# Patient Record
Sex: FEMALE | Race: WHITE | HISPANIC OR LATINO | Employment: UNEMPLOYED | URBAN - METROPOLITAN AREA
[De-identification: names, ages, dates, MRNs, and addresses within clinical notes are randomized per-mention and may not be internally consistent; named-entity substitution may affect disease eponyms.]

---

## 2017-07-03 ENCOUNTER — OFFICE VISIT (OUTPATIENT)
Dept: FAMILY MEDICINE CLINIC | Facility: CLINIC | Age: 57
End: 2017-07-03

## 2017-07-03 VITALS
RESPIRATION RATE: 16 BRPM | SYSTOLIC BLOOD PRESSURE: 120 MMHG | OXYGEN SATURATION: 97 % | HEIGHT: 62 IN | HEART RATE: 78 BPM | BODY MASS INDEX: 40.67 KG/M2 | DIASTOLIC BLOOD PRESSURE: 76 MMHG | WEIGHT: 221 LBS

## 2017-07-03 DIAGNOSIS — Z11.4 ENCOUNTER FOR SCREENING FOR HIV: ICD-10-CM

## 2017-07-03 DIAGNOSIS — E55.9 VITAMIN D DEFICIENCY: ICD-10-CM

## 2017-07-03 DIAGNOSIS — G89.4 CHRONIC PAIN SYNDROME: ICD-10-CM

## 2017-07-03 DIAGNOSIS — Z87.891 PERSONAL HISTORY OF TOBACCO USE, PRESENTING HAZARDS TO HEALTH: ICD-10-CM

## 2017-07-03 DIAGNOSIS — F41.8 DEPRESSION WITH ANXIETY: ICD-10-CM

## 2017-07-03 DIAGNOSIS — Z00.00 MEDICARE ANNUAL WELLNESS VISIT, INITIAL: Primary | ICD-10-CM

## 2017-07-03 LAB
25(OH)D3 SERPL-MCNC: 9.1 NG/ML
ALBUMIN SERPL-MCNC: 4.3 G/DL (ref 3.2–4.8)
ALBUMIN/GLOB SERPL: 1.6 G/DL (ref 1.5–2.5)
ALP SERPL-CCNC: 109 U/L (ref 25–100)
ALT SERPL W P-5'-P-CCNC: 16 U/L (ref 7–40)
ANION GAP SERPL CALCULATED.3IONS-SCNC: 8 MMOL/L (ref 3–11)
ARTICHOKE IGE QN: 201 MG/DL (ref 0–130)
AST SERPL-CCNC: 20 U/L (ref 0–33)
BASOPHILS # BLD AUTO: 0.06 10*3/MM3 (ref 0–0.2)
BASOPHILS NFR BLD AUTO: 0.7 % (ref 0–1)
BILIRUB SERPL-MCNC: 0.3 MG/DL (ref 0.3–1.2)
BUN BLD-MCNC: 10 MG/DL (ref 9–23)
BUN/CREAT SERPL: 20 (ref 7–25)
CALCIUM SPEC-SCNC: 9.9 MG/DL (ref 8.7–10.4)
CHLORIDE SERPL-SCNC: 100 MMOL/L (ref 99–109)
CHOLEST SERPL-MCNC: 266 MG/DL (ref 0–200)
CO2 SERPL-SCNC: 31 MMOL/L (ref 20–31)
CREAT BLD-MCNC: 0.5 MG/DL (ref 0.6–1.3)
CRP SERPL-MCNC: 1.02 MG/DL (ref 0–1)
DEPRECATED RDW RBC AUTO: 47.9 FL (ref 37–54)
EOSINOPHIL # BLD AUTO: 0.38 10*3/MM3 (ref 0–0.3)
EOSINOPHIL NFR BLD AUTO: 4.3 % (ref 0–3)
ERYTHROCYTE [DISTWIDTH] IN BLOOD BY AUTOMATED COUNT: 13.5 % (ref 11.3–14.5)
GFR SERPL CREATININE-BSD FRML MDRD: 128 ML/MIN/1.73
GFR SERPL CREATININE-BSD FRML MDRD: >150 ML/MIN/1.73
GLOBULIN UR ELPH-MCNC: 2.7 GM/DL
GLUCOSE BLD-MCNC: 72 MG/DL (ref 70–100)
HBA1C MFR BLD: 5.9 % (ref 4.8–5.6)
HCT VFR BLD AUTO: 42.1 % (ref 34.5–44)
HCV AB SER DONR QL: NORMAL
HDLC SERPL-MCNC: 53 MG/DL (ref 40–60)
HGB BLD-MCNC: 13.3 G/DL (ref 11.5–15.5)
HIV1+2 AB SER QL: NORMAL
IMM GRANULOCYTES # BLD: 0.04 10*3/MM3 (ref 0–0.03)
IMM GRANULOCYTES NFR BLD: 0.4 % (ref 0–0.6)
LYMPHOCYTES # BLD AUTO: 3.55 10*3/MM3 (ref 0.6–4.8)
LYMPHOCYTES NFR BLD AUTO: 39.7 % (ref 24–44)
MCH RBC QN AUTO: 30.6 PG (ref 27–31)
MCHC RBC AUTO-ENTMCNC: 31.6 G/DL (ref 32–36)
MCV RBC AUTO: 96.8 FL (ref 80–99)
MONOCYTES # BLD AUTO: 0.54 10*3/MM3 (ref 0–1)
MONOCYTES NFR BLD AUTO: 6 % (ref 0–12)
NEUTROPHILS # BLD AUTO: 4.37 10*3/MM3 (ref 1.5–8.3)
NEUTROPHILS NFR BLD AUTO: 48.9 % (ref 41–71)
PLATELET # BLD AUTO: 263 10*3/MM3 (ref 150–450)
PMV BLD AUTO: 10.3 FL (ref 6–12)
POTASSIUM BLD-SCNC: 4 MMOL/L (ref 3.5–5.5)
PROT SERPL-MCNC: 7 G/DL (ref 5.7–8.2)
RBC # BLD AUTO: 4.35 10*6/MM3 (ref 3.89–5.14)
SODIUM BLD-SCNC: 139 MMOL/L (ref 132–146)
TRIGL SERPL-MCNC: 249 MG/DL (ref 0–150)
TSH SERPL DL<=0.05 MIU/L-ACNC: 1.47 MIU/ML (ref 0.35–5.35)
URATE SERPL-MCNC: 5.2 MG/DL (ref 3.1–7.8)
WBC NRBC COR # BLD: 8.94 10*3/MM3 (ref 3.5–10.8)

## 2017-07-03 PROCEDURE — 82306 VITAMIN D 25 HYDROXY: CPT | Performed by: FAMILY MEDICINE

## 2017-07-03 PROCEDURE — G0432 EIA HIV-1/HIV-2 SCREEN: HCPCS | Performed by: FAMILY MEDICINE

## 2017-07-03 PROCEDURE — 36415 COLL VENOUS BLD VENIPUNCTURE: CPT | Performed by: FAMILY MEDICINE

## 2017-07-03 PROCEDURE — 84550 ASSAY OF BLOOD/URIC ACID: CPT | Performed by: FAMILY MEDICINE

## 2017-07-03 PROCEDURE — 83036 HEMOGLOBIN GLYCOSYLATED A1C: CPT | Performed by: FAMILY MEDICINE

## 2017-07-03 PROCEDURE — 80053 COMPREHEN METABOLIC PANEL: CPT | Performed by: FAMILY MEDICINE

## 2017-07-03 PROCEDURE — 86803 HEPATITIS C AB TEST: CPT | Performed by: FAMILY MEDICINE

## 2017-07-03 PROCEDURE — 86140 C-REACTIVE PROTEIN: CPT | Performed by: FAMILY MEDICINE

## 2017-07-03 PROCEDURE — 80061 LIPID PANEL: CPT | Performed by: FAMILY MEDICINE

## 2017-07-03 PROCEDURE — G0438 PPPS, INITIAL VISIT: HCPCS | Performed by: FAMILY MEDICINE

## 2017-07-03 PROCEDURE — 93000 ELECTROCARDIOGRAM COMPLETE: CPT | Performed by: FAMILY MEDICINE

## 2017-07-03 PROCEDURE — 84443 ASSAY THYROID STIM HORMONE: CPT | Performed by: FAMILY MEDICINE

## 2017-07-03 PROCEDURE — 99406 BEHAV CHNG SMOKING 3-10 MIN: CPT | Performed by: FAMILY MEDICINE

## 2017-07-03 PROCEDURE — 85025 COMPLETE CBC W/AUTO DIFF WBC: CPT | Performed by: FAMILY MEDICINE

## 2017-07-03 RX ORDER — TRAMADOL HYDROCHLORIDE 50 MG/1
TABLET ORAL
Refills: 0 | COMMUNITY
Start: 2017-05-31 | End: 2017-10-05

## 2017-07-03 RX ORDER — NICOTINE 21 MG/24HR
1 PATCH, TRANSDERMAL 24 HOURS TRANSDERMAL EVERY 24 HOURS
Start: 2017-07-03 | End: 2018-01-26

## 2017-07-03 RX ORDER — RANITIDINE 300 MG/1
TABLET ORAL
Refills: 0 | COMMUNITY
Start: 2017-05-30 | End: 2018-01-11 | Stop reason: SDUPTHER

## 2017-07-03 RX ORDER — VENLAFAXINE 75 MG/1
TABLET ORAL
Refills: 2 | COMMUNITY
Start: 2017-06-13 | End: 2018-08-31

## 2017-07-03 RX ORDER — CLONAZEPAM 1 MG/1
TABLET ORAL
Refills: 0 | COMMUNITY
Start: 2017-05-31 | End: 2017-10-05

## 2017-07-03 NOTE — PROGRESS NOTES
The ABCs of the Annual Wellness Visit    HEALTH RISK ASSESSMENT  INITIAL Medicare Wellness Visit  1960    Recent Hospitalizations: NONE    Current Medical Providers: Patient Care Team:  Tristan Isbell MD as PCP - Family Medicine    Smoking Status   History   Smoking Status   • Light Tobacco Smoker   • Packs/day: 0.25   • Years: 1.00   • Types: Cigarettes   Smokeless Tobacco   • Never Used       Alcohol Consumption   History   Alcohol Use No       Depression Screen   PHQ-2 Depression Screening 7/3/2017   Little interest or pleasure in doing things 0   Feeling down, depressed, or hopeless 0   PHQ-2 Total Score 0       Fall Risk Assessment  Fallen in past 6 months: 0--> No  Mental Status: 0--> no mental status change  Mobility: 0--> No mobility issues  Medications: 0--> No meds  Total Fall Risk Score: 0    Does the patient have evidence of cognitive impairment? No  Aspirin use counseling: Aspirin 81 mg po once daily advised.    Recent Lab Results:  New patient to the office with no previous physician records available for review today (records requested).    Subjective     History of Present Illness  Dee Grider is a 57 y.o. female who presents for an Initial Wellness Visit.  New patient to the office with previous and recent care by Dr. EVGENY Cline and previously with  [facility].  She describes a chronic pain syndrome and mood disorder diagnosis for which she takes controlled medications.  She will be needing referrals today to continue with her controlled medications.      The following portions of the patient's history were reviewed and updated as appropriate: past medical history, past surgical history, allergies, current medications, past family history and social history.      No outpatient prescriptions prior to visit.  New patient to the office with previous scripts filled by Dr. Cline.     No facility-administered medications prior to visit.      Review of Systems   Constitutional: Positive for  "fatigue. Negative for chills and fever.   HENT: Negative for nosebleeds and trouble swallowing.    Eyes: Negative.  Negative for visual disturbance.   Respiratory: Negative.    Cardiovascular: Negative.    Gastrointestinal: Negative.    Endocrine: Negative.  Negative for polydipsia, polyphagia and polyuria.   Genitourinary: Negative.  Negative for hematuria.   Musculoskeletal: Positive for arthralgias, back pain, myalgias and neck pain.   Skin: Negative for rash and wound.   Neurological: Negative.  Negative for seizures and syncope.   Hematological: Negative.  Does not bruise/bleed easily.   Psychiatric/Behavioral: Positive for sleep disturbance. Negative for confusion. The patient is nervous/anxious.        Objective     Visual Acuity    Visual Acuity Screening    Right eye Left eye Both eyes   With correction: 20/20 20/20 20/20     Vitals:    07/03/17 1324   BP: 120/76   Pulse: 78   Resp: 16   SpO2: 97%   Weight: 221 lb (100 kg)   Height: 61.5\" (156.2 cm)   PainSc: 0-No pain     Body mass index is 41.08 kg/(m^2). Discussed the patient's BMI with her. The BMI is above average; BMI management plan is completed.  Marina Mejia in room assisting  Physical Exam   Constitutional: She is oriented to person, place, and time. She appears well-developed and well-nourished. She is cooperative.   HENT:   Head: Normocephalic and atraumatic.   Right Ear: Hearing and external ear normal.   Left Ear: Hearing and external ear normal.   Nose: Nose normal.   Mouth/Throat: Uvula is midline, oropharynx is clear and moist and mucous membranes are normal.   Eyes: Conjunctivae and EOM are normal. Pupils are equal, round, and reactive to light. No scleral icterus.   Neck: Trachea normal and normal range of motion. Neck supple. No JVD present. Carotid bruit is not present. No thyromegaly present.   Cardiovascular: Normal rate, regular rhythm, normal heart sounds and intact distal pulses.    Pulmonary/Chest: Effort normal and breath " sounds normal.   Abdominal: Soft. Bowel sounds are normal. There is no hepatosplenomegaly. There is no tenderness.   Musculoskeletal: Normal range of motion.        Arms:  Right arm amputation identified (mid-humeral)   Lymphadenopathy:     She has no cervical adenopathy.   Neurological: She is alert and oriented to person, place, and time. She has normal strength and normal reflexes. No sensory deficit. Gait normal.   Skin: Skin is warm and dry.   Psychiatric: Her speech is normal and behavior is normal. Judgment and thought content normal. Her mood appears anxious. Cognition and memory are normal.   Nursing note and vitals reviewed.      ECG 12 Lead  Date/Time: 7/3/2017 1:55 PM  Performed by: MERCEDES RUST  Authorized by: MERCEDES RUST   Comparison: not compared with previous ECG   Previous ECG: no previous ECG available  Rhythm: sinus rhythm  Rate: normal  BPM: 69  Conduction: conduction normal  ST Segments: ST segments normal  QRS axis: normal  Clinical impression: normal ECG            Compared to one year ago, the patient feels her physical health is worse.  Compared to one year ago, the patient feels her mental health is worse.    Age-appropriate Screening Schedule  Refer to the list below for future screening recommendations based on patient's age, sex and/or medical conditions. Orders for these recommended tests are listed in the plan section. The patient has been provided with a written plan.    Health Maintenance   Topic Date Due   • PAP SMEAR  07/03/2017   • INFLUENZA VACCINE  08/01/2017   • MAMMOGRAM  07/03/2019   • COLONOSCOPY  07/30/2023   • TDAP/TD VACCINES (2 - Td) 07/03/2027   • PNEUMOCOCCAL VACCINE (19-64 MEDIUM RISK)  Addressed       Advance Care Planning  has an advance directive - a copy HAS NOT been provided. Have asked the patient to send this to us to add to record.    Identification of Risk Factors  Risk factors include: weight , unhealthy diet, cardiovascular risk, inactivity, tobacco use,  increased fall risk, chronic pain and depression.      Assessment/Plan   Patient Self-Management and Personalized Health Advice  The patient has been provided with information about: diet, exercise, weight management, prevention of cardiac or vascular disease, the relationship between weight and GERD, tobacco cessation, fall prevention, designing advance directives and mental health concerns and preventive services including: Advance directive, Counseling for cardiovascular disease risk reduction, Diabetes screening, see lab orders, Exercise counseling provided, Fall Risk assessment done, Glaucoma screening recommended, Nutrition counseling provided, Screening electrocardiogram, Smoking cessation counseling completed.    Tobacco cessation counseling lasting greater than 3 minutes but no more than 10 minutes is provided today.  We discussed various smoking cessation programs including Willie Whatley, Commit to Quit and Kick It.  We discussed over the counter nicotine replacement products.  We discussed the benefits of cessation.  We discussed the risks of ongoing use including but not limited to lung disease, cancer and death.  The patient is encouraged to discontinue smoking and treatment options have been discussed & offered.    Visit Diagnoses:    ICD-10-CM ICD-9-CM   1. Medicare annual wellness visit, initial Z00.00 V70.0   2. Chronic pain syndrome G89.4 338.4   3. Depression with anxiety F41.8 300.4   4. Encounter for screening for HIV  Z11.4 V73.89   5. Vitamin D deficiency  E55.9 268.9       Orders Placed This Encounter   Procedures   • Comprehensive Metabolic Panel   • Lipid Panel   • TSH   • Uric Acid   • C-reactive Protein   • Hepatitis C Antibody   • HIV-1 / O / 2 Ag / Antibody 4th Generation   • Hemoglobin A1c   • Vitamin D 25 Hydroxy   • Ambulatory Referral to Psychology   • Ambulatory Referral to Psychiatry   • Ambulatory Referral to Pain Management   • POC Urinalysis Dipstick, Automated   • ECG 12 Lead    • CBC & Differential       Medication Sig   • Cetirizine HCl (ZYRTEC ALLERGY PO) Take  by mouth.   • clonazePAM (KlonoPIN) 1 MG tablet TAKE 1 TABLET BY MOUTH TWICE A DAY AS NEEDED   • metoprolol tartrate (LOPRESSOR) 25 MG tablet TAKE ONE TABLET BY MOUTH TWICE DAILY   • raNITIdine (ZANTAC) 300 MG tablet TAKE 1 TABLET BY MOUTH TWICE A DAY   • traMADol (ULTRAM) 50 MG tablet TAKE 2 TABLET BY MOUTH 3 TIMES A DAY   • venlafaxine (EFFEXOR) 75 MG tablet TAKE ONE TABLET BY MOUTH TWICE DAILY     No facility-administered encounter medications on file as of 7/3/2017.      Reviewed use of high risk medication in the elderly: yes.  Reviewed for potential of harmful drug interactions in the elderly: yes.    Follow Up:  Return in about 1 year (around 7/3/2018), or if symptoms worsen or fail to improve.     An After Visit Summary and PPPS with all of these plans were given to the patient.        Tristan Isbell MD 07/03/17 2:15 PM

## 2017-10-05 ENCOUNTER — HOSPITAL ENCOUNTER (OUTPATIENT)
Dept: GENERAL RADIOLOGY | Facility: HOSPITAL | Age: 57
Discharge: HOME | End: 2017-10-05
Attending: FAMILY MEDICINE | Admitting: FAMILY MEDICINE

## 2017-10-05 ENCOUNTER — OFFICE VISIT (OUTPATIENT)
Dept: FAMILY MEDICINE CLINIC | Facility: CLINIC | Age: 57
End: 2017-10-05

## 2017-10-05 VITALS
HEIGHT: 62 IN | TEMPERATURE: 98.9 F | BODY MASS INDEX: 40.67 KG/M2 | SYSTOLIC BLOOD PRESSURE: 124 MMHG | DIASTOLIC BLOOD PRESSURE: 82 MMHG | HEART RATE: 74 BPM | OXYGEN SATURATION: 97 % | WEIGHT: 221 LBS

## 2017-10-05 DIAGNOSIS — J13 PNEUMONIA OF RIGHT MIDDLE LOBE DUE TO STREPTOCOCCUS PNEUMONIAE (HCC): Primary | ICD-10-CM

## 2017-10-05 DIAGNOSIS — J13 PNEUMONIA OF RIGHT MIDDLE LOBE DUE TO STREPTOCOCCUS PNEUMONIAE (HCC): ICD-10-CM

## 2017-10-05 PROCEDURE — 99214 OFFICE O/P EST MOD 30 MIN: CPT | Performed by: FAMILY MEDICINE

## 2017-10-05 PROCEDURE — 71020 HC CHEST PA AND LATERAL: CPT

## 2017-10-05 RX ORDER — LEVOFLOXACIN 500 MG/1
500 TABLET, FILM COATED ORAL DAILY
Qty: 10 TABLET | Refills: 0 | Status: SHIPPED | OUTPATIENT
Start: 2017-10-05 | End: 2017-10-15

## 2017-10-05 RX ORDER — ALBUTEROL SULFATE 90 UG/1
2 AEROSOL, METERED RESPIRATORY (INHALATION) EVERY 4 HOURS PRN
Start: 2017-10-05 | End: 2018-05-07 | Stop reason: SDUPTHER

## 2017-10-05 NOTE — PROGRESS NOTES
Subjective   Dee Grider is a 57 y.o. female.     History of Present Illness   The patient describes several days of runny nose and congestion.  It seems to be not improving with home care.  The patient reports associated sinus drainage and scratchy throat.  The patient is now experiencing an intermittent croupy cough.  She is concerned with some wheezing.  She reports sharp pain to her right upper chest wall area with deep inspiration over the last couple of days.  She denies purulent sputum production or hemoptysis.  She reports subjective fever and chills but no acute dyspnea.    Review of Systems   Constitutional: Positive for chills, fatigue and fever. Negative for diaphoresis.   HENT: Positive for congestion and postnasal drip. Negative for ear pain, mouth sores, nosebleeds, sinus pressure, sore throat and voice change.    Eyes: Negative for discharge.   Respiratory: Positive for cough and wheezing. Negative for chest tightness and shortness of breath.    Cardiovascular: Positive for chest pain. Negative for palpitations and leg swelling.   Gastrointestinal: Negative for abdominal pain, diarrhea, nausea and vomiting.   Genitourinary: Negative for difficulty urinating.   Musculoskeletal: Positive for arthralgias. Negative for myalgias.   Skin: Negative for rash.   Neurological: Negative for dizziness.       Objective   Physical Exam   Constitutional: She is oriented to person, place, and time. She appears well-developed and well-nourished.   HENT:   Head: Normocephalic.   Right Ear: Hearing, tympanic membrane, external ear and ear canal normal.   Left Ear: Hearing, tympanic membrane, external ear and ear canal normal.   Nose: Mucosal edema and rhinorrhea present. Right sinus exhibits no maxillary sinus tenderness. Left sinus exhibits no maxillary sinus tenderness.   Mouth/Throat: Uvula is midline. Posterior oropharyngeal erythema present.   Eyes: Conjunctivae are normal. Right eye exhibits no discharge. Left  eye exhibits no discharge.   Neck: Neck supple.   Cardiovascular: Normal rate, regular rhythm and normal heart sounds.    Pulmonary/Chest: Effort normal. She has wheezes.   Musculoskeletal: Normal range of motion.   Lymphadenopathy:     She has no cervical adenopathy.   Neurological: She is alert and oriented to person, place, and time.   Skin: Skin is warm. No rash noted.   Psychiatric: She has a normal mood and affect.   Vitals reviewed.      Assessment/Plan   Diagnoses and all orders for this visit:    Pneumonia of right middle lobe due to Streptococcus pneumoniae  -     XR Chest PA & Lateral ordered  -     levoFLOXacin (LEVAQUIN) 500 MG tablet; Take 1 tablet by mouth Daily for 10 days.  -     Chlorcyclizine-Pseudoephedrine 25-60 MG tablet; Take 1/2 to 1 tab po every 12 hours prn congestion  -     albuterol 108 (90 Base) MCG/ACT inhaler; Inhale 2 puffs Every 4 (Four) Hours As Needed for Wheezing.  - We discussed pleurisy and Ibuprofen 800 mg po every 8 hours   - Further recommendations after her imaging results  - RTC in one week

## 2017-12-09 RX ORDER — VENLAFAXINE 75 MG/1
TABLET ORAL
Qty: 60 TABLET | Refills: 0 | OUTPATIENT
Start: 2017-12-09

## 2018-01-12 RX ORDER — RANITIDINE 300 MG/1
TABLET ORAL
Qty: 60 TABLET | Refills: 2 | Status: SHIPPED | OUTPATIENT
Start: 2018-01-12 | End: 2018-04-15 | Stop reason: SDUPTHER

## 2018-01-26 ENCOUNTER — OFFICE VISIT (OUTPATIENT)
Dept: FAMILY MEDICINE CLINIC | Facility: CLINIC | Age: 58
End: 2018-01-26

## 2018-01-26 VITALS
TEMPERATURE: 98.7 F | BODY MASS INDEX: 41.22 KG/M2 | DIASTOLIC BLOOD PRESSURE: 80 MMHG | WEIGHT: 224 LBS | OXYGEN SATURATION: 96 % | HEART RATE: 92 BPM | SYSTOLIC BLOOD PRESSURE: 122 MMHG | HEIGHT: 62 IN | RESPIRATION RATE: 16 BRPM

## 2018-01-26 DIAGNOSIS — J01.00 ACUTE NON-RECURRENT MAXILLARY SINUSITIS: Primary | ICD-10-CM

## 2018-01-26 PROCEDURE — 99213 OFFICE O/P EST LOW 20 MIN: CPT | Performed by: FAMILY MEDICINE

## 2018-01-26 RX ORDER — AMOXICILLIN AND CLAVULANATE POTASSIUM 875; 125 MG/1; MG/1
1 TABLET, FILM COATED ORAL EVERY 12 HOURS SCHEDULED
Qty: 20 TABLET | Refills: 0 | Status: SHIPPED | OUTPATIENT
Start: 2018-01-26 | End: 2018-02-05

## 2018-01-26 RX ORDER — FLUTICASONE PROPIONATE 50 MCG
SPRAY, SUSPENSION (ML) NASAL
Qty: 1 BOTTLE | Refills: 0 | Status: SHIPPED | OUTPATIENT
Start: 2018-01-26 | End: 2018-02-23 | Stop reason: SDUPTHER

## 2018-01-26 NOTE — PROGRESS NOTES
Subjective   Dee Grider is a 57 y.o. female.     History of Present Illness   The patient describes greater than one week of sinus congestion not improving with home care.  The patient reports associated sinus pressure with nasal purulence.  The patient is now experiencing a post nasal drip with associated scratchy throat.  There is no fever, chills or acute dyspnea.    Review of Systems   Constitutional: Negative for chills and fever.   HENT: Positive for congestion, ear pain, postnasal drip, sinus pressure and sore throat. Negative for facial swelling.    Respiratory: Negative for cough.    Gastrointestinal: Negative for diarrhea, nausea and vomiting.   Skin: Negative for rash.       Objective   Physical Exam   Constitutional: She is oriented to person, place, and time. She appears well-developed and well-nourished.   HENT:   Head: Normocephalic and atraumatic.   Right Ear: Hearing, tympanic membrane, external ear and ear canal normal.   Left Ear: Hearing, tympanic membrane, external ear and ear canal normal.   Nose: Mucosal edema present. Right sinus exhibits maxillary sinus tenderness. Left sinus exhibits maxillary sinus tenderness.   Mouth/Throat: Uvula is midline. Posterior oropharyngeal erythema present.   Eyes: Conjunctivae are normal. Right eye exhibits no discharge. Left eye exhibits no discharge.   Neck: Neck supple.   Cardiovascular: Normal rate, regular rhythm and normal heart sounds.    Pulmonary/Chest: Effort normal and breath sounds normal.   Musculoskeletal: Normal range of motion.   Lymphadenopathy:     She has no cervical adenopathy.   Neurological: She is alert and oriented to person, place, and time.   Skin: Skin is warm. No rash noted.   Psychiatric: She has a normal mood and affect.   Vitals reviewed.      Assessment/Plan   Diagnoses and all orders for this visit:    Acute non-recurrent maxillary sinusitis  -     AUGMENTIN 875-125 MG per tablet; Take 1 tablet by mouth Every 12 (Twelve)  Hours for 10 days.  -     STAHIST AD 25-60 MG tablet; Take 1/2 to 1 tab po every 12 hours PRN congestion  -     Fluticasone 50 MCG/ACT nasal spray; Administer 2 sprays in each nostril ONCE DAILY.  - Rest, fluids, avoid sick contacts and RTC if not improved.

## 2018-02-23 DIAGNOSIS — J01.00 ACUTE NON-RECURRENT MAXILLARY SINUSITIS: ICD-10-CM

## 2018-02-25 RX ORDER — FLUTICASONE PROPIONATE 50 MCG
SPRAY, SUSPENSION (ML) NASAL
Qty: 16 ML | Refills: 5 | Status: SHIPPED | OUTPATIENT
Start: 2018-02-25 | End: 2019-03-26 | Stop reason: SDUPTHER

## 2018-04-17 RX ORDER — RANITIDINE 300 MG/1
TABLET ORAL
Qty: 60 TABLET | Refills: 2 | Status: SHIPPED | OUTPATIENT
Start: 2018-04-17 | End: 2018-07-23 | Stop reason: SDUPTHER

## 2018-05-07 ENCOUNTER — OFFICE VISIT (OUTPATIENT)
Dept: FAMILY MEDICINE CLINIC | Facility: CLINIC | Age: 58
End: 2018-05-07

## 2018-05-07 VITALS
RESPIRATION RATE: 20 BRPM | HEIGHT: 61 IN | BODY MASS INDEX: 41.54 KG/M2 | HEART RATE: 98 BPM | WEIGHT: 220 LBS | DIASTOLIC BLOOD PRESSURE: 70 MMHG | SYSTOLIC BLOOD PRESSURE: 118 MMHG | TEMPERATURE: 98.7 F | OXYGEN SATURATION: 98 %

## 2018-05-07 DIAGNOSIS — J40 BRONCHITIS: ICD-10-CM

## 2018-05-07 DIAGNOSIS — J01.40 ACUTE PANSINUSITIS, RECURRENCE NOT SPECIFIED: Primary | ICD-10-CM

## 2018-05-07 PROCEDURE — 99213 OFFICE O/P EST LOW 20 MIN: CPT | Performed by: NURSE PRACTITIONER

## 2018-05-07 RX ORDER — BROMPHENIRAMINE MALEATE, PSEUDOEPHEDRINE HYDROCHLORIDE, AND DEXTROMETHORPHAN HYDROBROMIDE 2; 30; 10 MG/5ML; MG/5ML; MG/5ML
SYRUP ORAL
Qty: 473 ML | Refills: 0 | Status: SHIPPED | OUTPATIENT
Start: 2018-05-07 | End: 2018-08-31

## 2018-05-07 RX ORDER — ALBUTEROL SULFATE 90 UG/1
2 AEROSOL, METERED RESPIRATORY (INHALATION) EVERY 4 HOURS PRN
Qty: 1 INHALER | Refills: 0
Start: 2018-05-07 | End: 2018-05-17

## 2018-05-07 RX ORDER — AMOXICILLIN AND CLAVULANATE POTASSIUM 875; 125 MG/1; MG/1
1 TABLET, FILM COATED ORAL EVERY 12 HOURS SCHEDULED
Qty: 20 TABLET | Refills: 0 | Status: SHIPPED | OUTPATIENT
Start: 2018-05-07 | End: 2018-05-17

## 2018-05-07 NOTE — PATIENT INSTRUCTIONS
Acute Bronchitis, Adult  Acute bronchitis is sudden (acute) swelling of the air tubes (bronchi) in the lungs. Acute bronchitis causes these tubes to fill with mucus, which can make it hard to breathe. It can also cause coughing or wheezing.  In adults, acute bronchitis usually goes away within 2 weeks. A cough caused by bronchitis may last up to 3 weeks. Smoking, allergies, and asthma can make the condition worse. Repeated episodes of bronchitis may cause further lung problems, such as chronic obstructive pulmonary disease (COPD).  What are the causes?  This condition can be caused by germs and by substances that irritate the lungs, including:  · Cold and flu viruses. This condition is most often caused by the same virus that causes a cold.  · Bacteria.  · Exposure to tobacco smoke, dust, fumes, and air pollution.  What increases the risk?  This condition is more likely to develop in people who:  · Have close contact with someone with acute bronchitis.  · Are exposed to lung irritants, such as tobacco smoke, dust, fumes, and vapors.  · Have a weak immune system.  · Have a respiratory condition such as asthma.  What are the signs or symptoms?  Symptoms of this condition include:  · A cough.  · Coughing up clear, yellow, or green mucus.  · Wheezing.  · Chest congestion.  · Shortness of breath.  · A fever.  · Body aches.  · Chills.  · A sore throat.  How is this diagnosed?  This condition is usually diagnosed with a physical exam. During the exam, your health care provider may order tests, such as chest X-rays, to rule out other conditions. He or she may also:  · Test a sample of your mucus for bacterial infection.  · Check the level of oxygen in your blood. This is done to check for pneumonia.  · Do a chest X-ray or lung function testing to rule out pneumonia and other conditions.  · Perform blood tests.  Your health care provider will also ask about your symptoms and medical history.  How is this treated?  Most cases  of acute bronchitis clear up over time without treatment. Your health care provider may recommend:  · Drinking more fluids. Drinking more makes your mucus thinner, which may make it easier to breathe.  · Taking a medicine for a fever or cough.  · Taking an antibiotic medicine.  · Using an inhaler to help improve shortness of breath and to control a cough.  · Using a cool mist vaporizer or humidifier to make it easier to breathe.  Follow these instructions at home:  Medicines   · Take over-the-counter and prescription medicines only as told by your health care provider.  · If you were prescribed an antibiotic, take it as told by your health care provider. Do not stop taking the antibiotic even if you start to feel better.  General instructions   · Get plenty of rest.  · Drink enough fluids to keep your urine clear or pale yellow.  · Avoid smoking and secondhand smoke. Exposure to cigarette smoke or irritating chemicals will make bronchitis worse. If you smoke and you need help quitting, ask your health care provider. Quitting smoking will help your lungs heal faster.  · Use an inhaler, cool mist vaporizer, or humidifier as told by your health care provider.  · Keep all follow-up visits as told by your health care provider. This is important.  How is this prevented?  To lower your risk of getting this condition again:  · Wash your hands often with soap and water. If soap and water are not available, use hand .  · Avoid contact with people who have cold symptoms.  · Try not to touch your hands to your mouth, nose, or eyes.  · Make sure to get the flu shot every year.  Contact a health care provider if:  · Your symptoms do not improve in 2 weeks of treatment.  Get help right away if:  · You cough up blood.  · You have chest pain.  · You have severe shortness of breath.  · You become dehydrated.  · You faint or keep feeling like you are going to faint.  · You keep vomiting.  · You have a severe headache.  · Your  fever or chills gets worse.  This information is not intended to replace advice given to you by your health care provider. Make sure you discuss any questions you have with your health care provider.  Document Released: 01/25/2006 Document Revised: 07/12/2017 Document Reviewed: 06/07/2017  Elsevier Interactive Patient Education © 2017 [employer]  Sinusitis, Adult  Sinusitis is soreness and inflammation of your sinuses. Sinuses are hollow spaces in the bones around your face. Your sinuses are located:  · Around your eyes.  · In the middle of your forehead.  · Behind your nose.  · In your cheekbones.  Your sinuses and nasal passages are lined with a stringy fluid (mucus). Mucus normally drains out of your sinuses. When your nasal tissues become inflamed or swollen, the mucus can become trapped or blocked so air cannot flow through your sinuses. This allows bacteria, viruses, and funguses to grow, which leads to infection.  Sinusitis can develop quickly and last for 7?10 days (acute) or for more than 12 weeks (chronic). Sinusitis often develops after a cold.  What are the causes?  This condition is caused by anything that creates swelling in the sinuses or stops mucus from draining, including:  · Allergies.  · Asthma.  · Bacterial or viral infection.  · Abnormally shaped bones between the nasal passages.  · Nasal growths that contain mucus (nasal polyps).  · Narrow sinus openings.  · Pollutants, such as chemicals or irritants in the air.  · A foreign object stuck in the nose.  · A fungal infection. This is rare.  What increases the risk?  The following factors may make you more likely to develop this condition:  · Having allergies or asthma.  · Having had a recent cold or respiratory tract infection.  · Having structural deformities or blockages in your nose or sinuses.  · Having a weak immune system.  · Doing a lot of swimming or diving.  · Overusing nasal sprays.  · Smoking.  What are the signs or symptoms?  The  main symptoms of this condition are pain and a feeling of pressure around the affected sinuses. Other symptoms include:  · Upper toothache.  · Earache.  · Headache.  · Bad breath.  · Decreased sense of smell and taste.  · A cough that may get worse at night.  · Fatigue.  · Fever.  · Thick drainage from your nose. The drainage is often green and it may contain pus (purulent).  · Stuffy nose or congestion.  · Postnasal drip. This is when extra mucus collects in the throat or back of the nose.  · Swelling and warmth over the affected sinuses.  · Sore throat.  · Sensitivity to light.  How is this diagnosed?  This condition is diagnosed based on symptoms, a medical history, and a physical exam. To find out if your condition is acute or chronic, your health care provider may:  · Look in your nose for signs of nasal polyps.  · Tap over the affected sinus to check for signs of infection.  · View the inside of your sinuses using an imaging device that has a light attached (endoscope).  If your health care provider suspects that you have chronic sinusitis, you may also:  · Be tested for allergies.  · Have a sample of mucus taken from your nose (nasal culture) and checked for bacteria.  · Have a mucus sample examined to see if your sinusitis is related to an allergy.  If your sinusitis does not respond to treatment and it lasts longer than 8 weeks, you may have an MRI or CT scan to check your sinuses. These scans also help to determine how severe your infection is.  In rare cases, a bone biopsy may be done to rule out more serious types of fungal sinus disease.  How is this treated?  Treatment for sinusitis depends on the cause and whether your condition is chronic or acute. If a virus is causing your sinusitis, your symptoms will go away on their own within 10 days. You may be given medicines to relieve your symptoms, including:  · Topical nasal decongestants. They shrink swollen nasal passages and let mucus drain from your  sinuses.  · Antihistamines. These drugs block inflammation that is triggered by allergies. This can help to ease swelling in your nose and sinuses.  · Topical nasal corticosteroids. These are nasal sprays that ease inflammation and swelling in your nose and sinuses.  · Nasal saline washes. These rinses can help to get rid of thick mucus in your nose.  If your condition is caused by bacteria, you will be given an antibiotic medicine. If your condition is caused by a fungus, you will be given an antifungal medicine.  Surgery may be needed to correct underlying conditions, such as narrow nasal passages. Surgery may also be needed to remove polyps.  Follow these instructions at home:  Medicines   · Take, use, or apply over-the-counter and prescription medicines only as told by your health care provider. These may include nasal sprays.  · If you were prescribed an antibiotic medicine, take it as told by your health care provider. Do not stop taking the antibiotic even if you start to feel better.  Hydrate and Humidify   · Drink enough water to keep your urine clear or pale yellow. Staying hydrated will help to thin your mucus.  · Use a cool mist humidifier to keep the humidity level in your home above 50%.  · Inhale steam for 10-15 minutes, 3-4 times a day or as told by your health care provider. You can do this in the bathroom while a hot shower is running.  · Limit your exposure to cool or dry air.  Rest   · Rest as much as possible.  · Sleep with your head raised (elevated).  · Make sure to get enough sleep each night.  General instructions   · Apply a warm, moist washcloth to your face 3-4 times a day or as told by your health care provider. This will help with discomfort.  · Wash your hands often with soap and water to reduce your exposure to viruses and other germs. If soap and water are not available, use hand .  · Do not smoke. Avoid being around people who are smoking (secondhand smoke).  · Keep all  follow-up visits as told by your health care provider. This is important.  Contact a health care provider if:  · You have a fever.  · Your symptoms get worse.  · Your symptoms do not improve within 10 days.  Get help right away if:  · You have a severe headache.  · You have persistent vomiting.  · You have pain or swelling around your face or eyes.  · You have vision problems.  · You develop confusion.  · Your neck is stiff.  · You have trouble breathing.  This information is not intended to replace advice given to you by your health care provider. Make sure you discuss any questions you have with your health care provider.  Document Released: 12/18/2006 Document Revised: 08/13/2017 Document Reviewed: 10/12/2016  [employer] Interactive Patient Education © 2017 [employer]

## 2018-05-07 NOTE — PROGRESS NOTES
"Subjective   Dee Grider is a 57 y.o. female.   Chief Complaint   Patient presents with   • URI    same day - acute visit   URI    This is a new problem. The current episode started in the past 7 days. There has been no fever (felt like a fever yesterday ). Associated symptoms include congestion, coughing, headaches, rhinorrhea, sinus pain, a sore throat and wheezing. Pertinent negatives include no sneezing. Treatments tried: zyrtec, flonase,  The treatment provided mild relief.        The following portions of the patient's history were reviewed and updated as appropriate: allergies, current medications, past family history, past medical history, past social history, past surgical history and problem list.    Review of Systems   Constitutional: Positive for chills, fatigue and fever. Negative for activity change and appetite change.   HENT: Positive for congestion, rhinorrhea, sinus pain, sinus pressure and sore throat. Negative for sneezing.    Respiratory: Positive for cough and wheezing.    Cardiovascular: Negative.    Gastrointestinal: Negative.    Skin: Negative.    Allergic/Immunologic: Positive for environmental allergies.   Neurological: Positive for dizziness and headaches.       Objective   Allergies   Allergen Reactions   • Aspirin Nausea And Vomiting     Visit Vitals  /70   Pulse 98   Temp 98.7 °F (37.1 °C) (Tympanic)   Resp 20   Ht 154.9 cm (61\")   Wt 99.8 kg (220 lb)   SpO2 98%   BMI 41.57 kg/m²       Physical Exam   HENT:   Nose: Mucosal edema and sinus tenderness present. Right sinus exhibits maxillary sinus tenderness and frontal sinus tenderness. Left sinus exhibits maxillary sinus tenderness and frontal sinus tenderness.   Mouth/Throat: Uvula is midline. Posterior oropharyngeal erythema present.   Pulmonary/Chest: She has wheezes.       Assessment/Plan   Dee was seen today for uri.    Diagnoses and all orders for this visit:    Acute pansinusitis, recurrence not specified  -     " amoxicillin-clavulanate (AUGMENTIN) 875-125 MG per tablet; Take 1 tablet by mouth Every 12 (Twelve) Hours for 10 days.    Bronchitis  -     albuterol (PROVENTIL HFA;VENTOLIN HFA) 108 (90 Base) MCG/ACT inhaler; Inhale 2 puffs Every 4 (Four) Hours As Needed for Wheezing or Shortness of Air for up to 10 days.  -     brompheniramine-pseudoephedrine-DM 30-2-10 MG/5ML syrup; 5-10 ml every 6 hours as needed for cough, congestion or allergies        Follow up with Dr. Isbell is you fail to improve or you worsen.   See patient instructions for sinusitis / Bronchitis         Zeina Martinez, APRN

## 2018-07-23 RX ORDER — RANITIDINE 300 MG/1
TABLET ORAL
Qty: 60 TABLET | Refills: 2 | Status: SHIPPED | OUTPATIENT
Start: 2018-07-23 | End: 2018-10-22 | Stop reason: SDUPTHER

## 2018-08-31 ENCOUNTER — OFFICE VISIT (OUTPATIENT)
Dept: FAMILY MEDICINE CLINIC | Facility: CLINIC | Age: 58
End: 2018-08-31

## 2018-08-31 VITALS
RESPIRATION RATE: 18 BRPM | OXYGEN SATURATION: 96 % | DIASTOLIC BLOOD PRESSURE: 76 MMHG | SYSTOLIC BLOOD PRESSURE: 120 MMHG | BODY MASS INDEX: 41.84 KG/M2 | HEIGHT: 61 IN | WEIGHT: 221.6 LBS | HEART RATE: 73 BPM

## 2018-08-31 DIAGNOSIS — R01.1 MURMUR, HEART: ICD-10-CM

## 2018-08-31 DIAGNOSIS — E55.9 VITAMIN D DEFICIENCY: ICD-10-CM

## 2018-08-31 DIAGNOSIS — Z00.00 MEDICARE ANNUAL WELLNESS VISIT, SUBSEQUENT: Primary | ICD-10-CM

## 2018-08-31 DIAGNOSIS — R79.9 ABNORMAL FINDING OF BLOOD CHEMISTRY: ICD-10-CM

## 2018-08-31 DIAGNOSIS — I10 ESSENTIAL HYPERTENSION: ICD-10-CM

## 2018-08-31 DIAGNOSIS — Z12.11 SCREENING FOR COLON CANCER: ICD-10-CM

## 2018-08-31 LAB
25(OH)D3 SERPL-MCNC: 14.5 NG/ML
ALBUMIN SERPL-MCNC: 4.84 G/DL (ref 3.2–4.8)
ALBUMIN/GLOB SERPL: 2.1 G/DL (ref 1.5–2.5)
ALP SERPL-CCNC: 84 U/L (ref 25–100)
ALT SERPL W P-5'-P-CCNC: 23 U/L (ref 7–40)
ANION GAP SERPL CALCULATED.3IONS-SCNC: 2 MMOL/L (ref 3–11)
ARTICHOKE IGE QN: 192 MG/DL (ref 0–130)
AST SERPL-CCNC: 17 U/L (ref 0–33)
BASOPHILS # BLD AUTO: 0.05 10*3/MM3 (ref 0–0.2)
BASOPHILS NFR BLD AUTO: 0.6 % (ref 0–1)
BILIRUB BLD-MCNC: NEGATIVE MG/DL
BILIRUB SERPL-MCNC: 0.5 MG/DL (ref 0.3–1.2)
BUN BLD-MCNC: 11 MG/DL (ref 9–23)
BUN/CREAT SERPL: 16.7 (ref 7–25)
CALCIUM SPEC-SCNC: 9.9 MG/DL (ref 8.7–10.4)
CHLORIDE SERPL-SCNC: 105 MMOL/L (ref 99–109)
CHOLEST SERPL-MCNC: 253 MG/DL (ref 0–200)
CLARITY, POC: CLEAR
CO2 SERPL-SCNC: 32 MMOL/L (ref 20–31)
COLOR UR: YELLOW
CREAT BLD-MCNC: 0.66 MG/DL (ref 0.6–1.3)
DEPRECATED RDW RBC AUTO: 47.2 FL (ref 37–54)
EOSINOPHIL # BLD AUTO: 0.38 10*3/MM3 (ref 0–0.3)
EOSINOPHIL NFR BLD AUTO: 4.5 % (ref 0–3)
ERYTHROCYTE [DISTWIDTH] IN BLOOD BY AUTOMATED COUNT: 13.7 % (ref 11.3–14.5)
GFR SERPL CREATININE-BSD FRML MDRD: 112 ML/MIN/1.73
GFR SERPL CREATININE-BSD FRML MDRD: 93 ML/MIN/1.73
GLOBULIN UR ELPH-MCNC: 2.4 GM/DL
GLUCOSE BLD-MCNC: 90 MG/DL (ref 70–100)
GLUCOSE UR STRIP-MCNC: NEGATIVE MG/DL
HBA1C MFR BLD: 6.3 % (ref 4.8–5.6)
HCT VFR BLD AUTO: 44.1 % (ref 34.5–44)
HDLC SERPL-MCNC: 49 MG/DL (ref 40–60)
HGB BLD-MCNC: 14.2 G/DL (ref 11.5–15.5)
IMM GRANULOCYTES # BLD: 0.03 10*3/MM3 (ref 0–0.03)
IMM GRANULOCYTES NFR BLD: 0.4 % (ref 0–0.6)
KETONES UR QL: NEGATIVE
LEUKOCYTE EST, POC: NEGATIVE
LYMPHOCYTES # BLD AUTO: 3.19 10*3/MM3 (ref 0.6–4.8)
LYMPHOCYTES NFR BLD AUTO: 37.8 % (ref 24–44)
MCH RBC QN AUTO: 30.3 PG (ref 27–31)
MCHC RBC AUTO-ENTMCNC: 32.2 G/DL (ref 32–36)
MCV RBC AUTO: 94 FL (ref 80–99)
MONOCYTES # BLD AUTO: 0.49 10*3/MM3 (ref 0–1)
MONOCYTES NFR BLD AUTO: 5.8 % (ref 0–12)
NEUTROPHILS # BLD AUTO: 4.34 10*3/MM3 (ref 1.5–8.3)
NEUTROPHILS NFR BLD AUTO: 51.3 % (ref 41–71)
NITRITE UR-MCNC: NEGATIVE MG/ML
PH UR: 5 [PH] (ref 5–8)
PLATELET # BLD AUTO: 285 10*3/MM3 (ref 150–450)
PMV BLD AUTO: 11 FL (ref 6–12)
POTASSIUM BLD-SCNC: 4 MMOL/L (ref 3.5–5.5)
PROT SERPL-MCNC: 7.2 G/DL (ref 5.7–8.2)
PROT UR STRIP-MCNC: NEGATIVE MG/DL
RBC # BLD AUTO: 4.69 10*6/MM3 (ref 3.89–5.14)
RBC # UR STRIP: NEGATIVE /UL
SODIUM BLD-SCNC: 139 MMOL/L (ref 132–146)
SP GR UR: 1.02 (ref 1–1.03)
TRIGL SERPL-MCNC: 202 MG/DL (ref 0–150)
TSH SERPL DL<=0.05 MIU/L-ACNC: 2.53 MIU/ML (ref 0.35–5.35)
URATE SERPL-MCNC: 6.1 MG/DL (ref 3.1–7.8)
UROBILINOGEN UR QL: NORMAL
WBC NRBC COR # BLD: 8.45 10*3/MM3 (ref 3.5–10.8)

## 2018-08-31 PROCEDURE — 82306 VITAMIN D 25 HYDROXY: CPT | Performed by: FAMILY MEDICINE

## 2018-08-31 PROCEDURE — 80061 LIPID PANEL: CPT | Performed by: FAMILY MEDICINE

## 2018-08-31 PROCEDURE — 85025 COMPLETE CBC W/AUTO DIFF WBC: CPT | Performed by: FAMILY MEDICINE

## 2018-08-31 PROCEDURE — 84550 ASSAY OF BLOOD/URIC ACID: CPT | Performed by: FAMILY MEDICINE

## 2018-08-31 PROCEDURE — G0439 PPPS, SUBSEQ VISIT: HCPCS | Performed by: FAMILY MEDICINE

## 2018-08-31 PROCEDURE — 83036 HEMOGLOBIN GLYCOSYLATED A1C: CPT | Performed by: FAMILY MEDICINE

## 2018-08-31 PROCEDURE — 81003 URINALYSIS AUTO W/O SCOPE: CPT | Performed by: FAMILY MEDICINE

## 2018-08-31 PROCEDURE — 80053 COMPREHEN METABOLIC PANEL: CPT | Performed by: FAMILY MEDICINE

## 2018-08-31 PROCEDURE — 84443 ASSAY THYROID STIM HORMONE: CPT | Performed by: FAMILY MEDICINE

## 2018-08-31 PROCEDURE — 36415 COLL VENOUS BLD VENIPUNCTURE: CPT | Performed by: FAMILY MEDICINE

## 2018-08-31 RX ORDER — SUMATRIPTAN 100 MG/1
TABLET, FILM COATED ORAL
Qty: 60 TABLET | Refills: 0 | OUTPATIENT
Start: 2018-08-31

## 2018-09-12 RX ORDER — SUMATRIPTAN 100 MG/1
TABLET, FILM COATED ORAL
Qty: 60 TABLET | Refills: 0 | OUTPATIENT
Start: 2018-09-12

## 2018-09-12 NOTE — TELEPHONE ENCOUNTER
Please encourage patient to reach out to previously prescribing physician.  Quantity requested # [phone].

## 2018-09-13 RX ORDER — SUMATRIPTAN 100 MG/1
TABLET, FILM COATED ORAL
Qty: 60 TABLET | Refills: 0 | OUTPATIENT
Start: 2018-09-13

## 2018-09-14 RX ORDER — SUMATRIPTAN 100 MG/1
TABLET, FILM COATED ORAL
Qty: 60 TABLET | Refills: 0 | OUTPATIENT
Start: 2018-09-14

## 2018-09-24 DIAGNOSIS — Z12.11 SCREENING FOR COLON CANCER: Primary | ICD-10-CM

## 2018-09-24 RX ORDER — SODIUM, POTASSIUM,MAG SULFATES 17.5-3.13G
1 SOLUTION, RECONSTITUTED, ORAL ORAL TAKE AS DIRECTED
Qty: 2 BOTTLE | Refills: 0 | Status: SHIPPED | OUTPATIENT
Start: 2018-09-24 | End: 2018-10-02

## 2018-10-01 ENCOUNTER — OUTSIDE FACILITY SERVICE (OUTPATIENT)
Dept: GASTROENTEROLOGY | Facility: CLINIC | Age: 58
End: 2018-10-01

## 2018-10-01 ENCOUNTER — LAB REQUISITION (OUTPATIENT)
Dept: LAB | Facility: HOSPITAL | Age: 58
End: 2018-10-01

## 2018-10-01 DIAGNOSIS — Z12.11 ENCOUNTER FOR SCREENING FOR MALIGNANT NEOPLASM OF COLON: ICD-10-CM

## 2018-10-01 PROCEDURE — 45385 COLONOSCOPY W/LESION REMOVAL: CPT | Performed by: INTERNAL MEDICINE

## 2018-10-01 PROCEDURE — 88305 TISSUE EXAM BY PATHOLOGIST: CPT | Performed by: INTERNAL MEDICINE

## 2018-10-01 NOTE — TELEPHONE ENCOUNTER
----- Message from Ben Long sent at 10/1/2018 10:46 AM EDT -----  Regarding: RX REFILL REQUEST  GABRIEL MCNEIL  CALLED REQUESTING A REFILL OF SUMATRIPTAN, BUT I DIDN'T SEE IT ON THE PT MED LIST.

## 2018-10-02 ENCOUNTER — OFFICE VISIT (OUTPATIENT)
Dept: FAMILY MEDICINE CLINIC | Facility: CLINIC | Age: 58
End: 2018-10-02

## 2018-10-02 VITALS
RESPIRATION RATE: 18 BRPM | BODY MASS INDEX: 42.56 KG/M2 | DIASTOLIC BLOOD PRESSURE: 80 MMHG | OXYGEN SATURATION: 97 % | HEART RATE: 83 BPM | WEIGHT: 225.4 LBS | SYSTOLIC BLOOD PRESSURE: 120 MMHG | HEIGHT: 61 IN

## 2018-10-02 DIAGNOSIS — G89.29 CHRONIC NECK PAIN: ICD-10-CM

## 2018-10-02 DIAGNOSIS — M54.2 CHRONIC NECK PAIN: ICD-10-CM

## 2018-10-02 DIAGNOSIS — G43.719 INTRACTABLE CHRONIC MIGRAINE WITHOUT AURA AND WITHOUT STATUS MIGRAINOSUS: Primary | ICD-10-CM

## 2018-10-02 PROCEDURE — 99215 OFFICE O/P EST HI 40 MIN: CPT | Performed by: FAMILY MEDICINE

## 2018-10-02 RX ORDER — SUMATRIPTAN 100 MG/1
100 TABLET, FILM COATED ORAL ONCE AS NEEDED
Qty: 9 TABLET | Refills: 11 | Status: SHIPPED | OUTPATIENT
Start: 2018-10-02 | End: 2018-10-03

## 2018-10-02 NOTE — PROGRESS NOTES
"iNca Grider is a 58 y.o. female.     History of Present Illness   She is here to discuss her headaches and neck pain.  She is accompanied by her 21 year old daughter.  The patient describes a chronic history of migraine headaches going back to her young adult life diagnosed by her previous physicians.  She describes about once monthly intense, severe headache behind her left eye.  It is a piercing pain that gradually crescendos and she typically has to lay down and rest.  She denies associated aura, visual changes, scotoma or other neurological changes.  She will occasionally get nausea but no emesis.  She describes photophobia.  She has been prescribed Imitrex in the past with good succuss.  She tolerates the medication well with no adverse events.  She is asking for a prescription.  She reports about one migraine a month.  She reports stress aggravates her migraines.  She is under a lot of stress.  She reports a past history of counseling and behavior medicine follow ups.    Today she is also reporting neck pain over several years.  There is no recalled injury or trauma.  Her previous physician told her she had \"disc problems.\"  The patient reports overuse with activities at work and at home.  The pain is characterized as sharp and tight.  It is also characterized as constant, dull, throbbing ache.   It is located to the bottom of the neck area and radiates to the shoulder and back of head often causing tension headahes.  Head turning and prolong posture activities make it worse.  Rest and massage help.  There is no upper extremity numbness, tingling or loss of .    Review of Systems   Constitutional: Negative for chills and fever.   Respiratory: Negative for shortness of breath.    Cardiovascular: Negative for chest pain and palpitations.   Gastrointestinal: Positive for nausea. Negative for vomiting.   Musculoskeletal: Positive for neck pain.   Neurological: Positive for headaches. Negative " for dizziness, tremors, syncope, facial asymmetry, speech difficulty, weakness and numbness.   Hematological: Does not bruise/bleed easily.   Psychiatric/Behavioral: The patient is nervous/anxious.        Objective   Physical Exam   Constitutional: She is oriented to person, place, and time. She appears well-developed and well-nourished.   HENT:   Head: Normocephalic and atraumatic.   Right Ear: Hearing normal.   Left Ear: Hearing normal.   Mouth/Throat: Mucous membranes are normal.   Eyes: Pupils are equal, round, and reactive to light. Conjunctivae and EOM are normal.   Neck: Neck supple. No JVD present. Muscular tenderness present. Decreased range of motion present. No thyromegaly present.   Cardiovascular: Normal rate, regular rhythm and normal heart sounds.    Pulmonary/Chest: Effort normal and breath sounds normal.   Abdominal: Soft. Bowel sounds are normal. There is no tenderness.   Musculoskeletal:        Cervical back: She exhibits decreased range of motion. She exhibits no bony tenderness.   Neurological: She is alert and oriented to person, place, and time. She has normal strength. No sensory deficit. Gait normal.   Skin: Skin is warm and dry.   Psychiatric: Her behavior is normal. Judgment and thought content normal. Her mood appears anxious. Cognition and memory are normal.   Nursing note and vitals reviewed.      Assessment/Plan   Diagnoses and all orders for this visit:    Intractable chronic migraine without aura and without status migrainosus  -     IMITREX 100 MG tablet; Take one tablet at onset of headache. May repeat dose one time in 2 hours if headache not relieved.  - We discussed counseling for stress  - We discussed prevention & relaxation techniques  - We reviewed web sites for patient education  - We discussed a neurology referral and she declined a referral today  - RTC or go to the ED with any persistent headaches    Chronic neck pain  -     Ambulatory Referral to Physical Therapy  Evaluate and treat  - Cervical spine range of motion stretches daily  - Upper back strengthening exercises  - Gentle massage prn  - Posture mechanics reviewed  - Avoid overuse  - We discussed imaging if not improved or if she experiences any radiculopathy symptoms.       Today I have spent a total of 40 minutes face to face with Dee Grider and her daughter.  During this time, a total of 25 minutes was spent counseling on the nature of her diagnosis including risks and benefits of treatment, complications, implications, management, safe and proper use of medications.  We discussed the work up and specialist referral process if needed.  Today I encouraged therapeutic lifestyle changes including a low calorie, healthy heart diet, daily aerobic exercise and medication compliance and routine counseling for stress.  Patient education is provided today concerning related diagnosis with Internet resources reviewed and discussed.

## 2018-10-03 LAB
CYTO UR: NORMAL
LAB AP CASE REPORT: NORMAL
LAB AP CLINICAL INFORMATION: NORMAL
PATH REPORT.FINAL DX SPEC: NORMAL
PATH REPORT.GROSS SPEC: NORMAL

## 2018-10-09 ENCOUNTER — TELEPHONE (OUTPATIENT)
Dept: GASTROENTEROLOGY | Facility: CLINIC | Age: 58
End: 2018-10-09

## 2018-10-09 NOTE — PROGRESS NOTES
Encounter Date:10/12/2018      Patient ID: Dee Grider is a 58 y.o. female.    Chief Complaint: No chief complaint on file.      PROBLEM LIST:  1. ***  a. ***  i. ***    History of Present Illness  Patient presents today for follow-up with a history of *** and cardiac risk factors. ***. Denies chest pain, shortness of breath, leg swelling, palpitations, and syncope. Remains busy and active with ***.    Allergies   Allergen Reactions   • Aspirin Nausea And Vomiting         Current Outpatient Prescriptions:   •  Cetirizine HCl (ZYRTEC ALLERGY PO), Take  by mouth., Disp: , Rfl:   •  fluticasone (FLONASE) 50 MCG/ACT nasal spray, ADMINISTER 2 SPRAYS IN EACH NOSTRIL ONCE DAILY., Disp: 16 mL, Rfl: 5  •  metoprolol tartrate (LOPRESSOR) 25 MG tablet, Take 1 tablet by mouth 2 (Two) Times a Day., Disp: 60 tablet, Rfl: 12  •  raNITIdine (ZANTAC) 300 MG tablet, TAKE ONE TABLET BY MOUTH TWICE DAILY, Disp: 60 tablet, Rfl: 2    The following portions of the patient's history were reviewed and updated as appropriate: allergies, current medications, past family history, past medical history, past social history, past surgical history and problem list.    ROS  Review of Systems   Constitution: Negative for chills, fever, weight gain and weight loss.   Cardiovascular: Negative for chest pain, claudication, dyspnea on exertion, leg swelling, orthopnea, palpitations, paroxysmal nocturnal dyspnea and syncope.        No dizziness   Gastrointestinal: Negative for abdominal pain, constipation, diarrhea, nausea and vomiting.   Genitourinary:        No urinary symptoms   Neurological:        No symptoms of stroke.   All other systems reviewed and are negative.    Objective:     There were no vitals taken for this visit.  Repeat blood pressure measurement by, Benjamín Martinez MD, at ***      Physical Exam  Constitutional: She appears well-developed and well-nourished.   HENT:   HEENT exam unremarkable.   Neck: Neck supple. No JVD  present.   No carotid bruits.   Cardiovascular: Normal rate, regular rhythm and normal heart sounds.    No murmur heard.  2 plus symmetric pulses.   Pulmonary/Chest: Breath sounds normal. Does not exhibit tenderness.   Abdominal:   Abdomen benign.   Musculoskeletal: Does not exhibit edema.   Neurological:   Neurological exam unremarkable.   Vitals reviewed.    Lab Review:   Procedures       Assessment:   No diagnosis found.  Plan:   ***  Stable cardiac status.  Continue current medications.   FU in *** MO, sooner as needed.  Thank you for allowing us to participate in the care of your patient.     Scribed for Benjamín Martinez MD by Chrissie Morales. 10/9/2018  8:59 AM    Please note that portions of this note may have been completed with a voice recognition program. Efforts were made to edit the dictations, but occasionally words are mistranscribed.

## 2018-10-10 ENCOUNTER — HOSPITAL ENCOUNTER (OUTPATIENT)
Dept: PHYSICAL THERAPY | Facility: HOSPITAL | Age: 58
Setting detail: THERAPIES SERIES
Discharge: HOME | End: 2018-10-10

## 2018-10-10 DIAGNOSIS — M54.2 NECK PAIN: Primary | ICD-10-CM

## 2018-10-10 PROCEDURE — G8984 CARRY CURRENT STATUS: HCPCS | Performed by: PHYSICAL THERAPIST

## 2018-10-10 PROCEDURE — 97162 PT EVAL MOD COMPLEX 30 MIN: CPT | Performed by: PHYSICAL THERAPIST

## 2018-10-10 PROCEDURE — G8985 CARRY GOAL STATUS: HCPCS | Performed by: PHYSICAL THERAPIST
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
## 2018-10-11 PROBLEM — G47.33 OSA (OBSTRUCTIVE SLEEP APNEA): Status: ACTIVE | Noted: 2018-10-11

## 2018-10-11 PROBLEM — K21.9 GERD (GASTROESOPHAGEAL REFLUX DISEASE): Status: ACTIVE | Noted: 2018-10-11

## 2018-10-11 PROBLEM — E66.01 MORBID OBESITY (HCC): Status: ACTIVE | Noted: 2018-10-11

## 2018-10-11 PROBLEM — I10 HTN (HYPERTENSION): Status: ACTIVE | Noted: 2018-10-11

## 2018-10-11 PROBLEM — R01.1 MURMUR, HEART: Status: ACTIVE | Noted: 2018-10-11

## 2018-10-11 PROBLEM — J30.2 SEASONAL ALLERGIES: Status: ACTIVE | Noted: 2018-10-11

## 2018-10-11 PROBLEM — E78.2 MIXED HYPERLIPIDEMIA: Status: ACTIVE | Noted: 2018-10-11

## 2018-10-11 PROBLEM — G43.909 MIGRAINE: Status: ACTIVE | Noted: 2018-10-11

## 2018-10-11 PROBLEM — R73.03 PREDIABETES: Status: ACTIVE | Noted: 2018-10-11

## 2018-10-11 NOTE — PROGRESS NOTES
Subjective   Dee Grider is a 58 y.o. female.  Tristan Isbell MD Emeric, Edgar, MD  [address] [place] [address] [place] [address]      Chief Complaint   Patient presents with   • Dizziness   • Edema     Patient Active Problem List    Diagnosis Date Noted   • Murmur, heart 10/11/2018     Priority: High   • HTN (hypertension) 10/11/2018     Priority: Medium   • Familial hypercholesterolemia 10/11/2018     Priority: Medium   • Tobacco abuse 10/12/2018     Priority: Low   • Morbid obesity (CMS/HCC) 10/11/2018     Priority: Low   • MANUEL (obstructive sleep apnea) 10/11/2018     Priority: Low   • Prediabetes 10/11/2018     Priority: Low   • Seasonal allergies 10/11/2018   • GERD (gastroesophageal reflux disease) 10/11/2018   • Migraine 10/11/2018     Patient Active Problem List    Diagnosis   • Murmur, heart [R01.1]   • HTN (hypertension) [I10]   • Familial hypercholesterolemia [E78.01]   • Tobacco abuse [Z72.0]   • Morbid obesity (CMS/HCC) [E66.01]   • MANUEL (obstructive sleep apnea) [G47.33]   • Prediabetes [R73.03]   • Seasonal allergies [J30.2]   • GERD (gastroesophageal reflux disease) [K21.9]   • Migraine [G43.909]        History of Present Illness   This is a 58 year old hypertensive dyslipidemic prediabetic obese female with no prior cardiac history.  She was recently examined by primary care and noticed to have a murmur, and is referred to our service for further evaluation.  Patient states she was aware that she had a murmur for many years.  She has a history of rheumatic fever as a child.  She has no history of angina, exertional dyspnea or heart failure.  She denies orthopnea, PND, claudication, significant lower extremity edema.  She has no awareness of tachycardia arrhythmias no dizziness or syncope.  She had recent lipid panel by primary care after which she was advised to pursue a low-fat diet.  She states she has been taking metoprolol for approximately 3 years for migraine  headaches.    Past Surgical History:   Procedure Laterality Date   • ARM AMPUTATION Right 2006   • COLONOSCOPY  2013   • HYSTERECTOMY  2004   • SHOULDER ARTHROSCOPY Left 2011   • TONSILLECTOMY  1973   • UPPER GASTROINTESTINAL ENDOSCOPY  2014       The following portions of the patient's history were reviewed and updated as appropriate: allergies, current medications, past family history, past medical history, past social history, past surgical history and problem list.    Allergies   Allergen Reactions   • Aspirin Nausea And Vomiting         Current Outpatient Prescriptions:   •  Cetirizine HCl (ZYRTEC ALLERGY PO), Take  by mouth., Disp: , Rfl:   •  fluticasone (FLONASE) 50 MCG/ACT nasal spray, ADMINISTER 2 SPRAYS IN EACH NOSTRIL ONCE DAILY., Disp: 16 mL, Rfl: 5  •  metoprolol tartrate (LOPRESSOR) 25 MG tablet, Take 1 tablet by mouth 2 (Two) Times a Day., Disp: 60 tablet, Rfl: 12  •  raNITIdine (ZANTAC) 300 MG tablet, TAKE ONE TABLET BY MOUTH TWICE DAILY, Disp: 60 tablet, Rfl: 2  •  SUMAtriptan (IMITREX) 100 MG tablet, Take one tablet at onset of headache. May repeat dose one time in 2 hours if headache not relieved. Take one tablet at onset of headache. May repeat dose one time in 2 hours if headache not relieved., Disp: , Rfl:     Review of Systems   Constitution: Positive for malaise/fatigue.   HENT: Positive for hearing loss and tinnitus.    Eyes: Negative.    Cardiovascular: Positive for leg swelling.   Respiratory: Positive for sleep disturbances due to breathing and snoring.    Endocrine: Negative.    Hematologic/Lymphatic: Negative.    Skin: Negative.    Musculoskeletal: Positive for arthritis, back pain and neck pain.   Gastrointestinal: Positive for constipation and heartburn.   Genitourinary: Negative.    Neurological: Positive for excessive daytime sleepiness, dizziness, headaches and vertigo.   Psychiatric/Behavioral: Positive for depression and memory loss. The patient is nervous/anxious.   "  Allergic/Immunologic: Negative.    All other systems reviewed and are negative.      Social History     Social History   • Marital status:      Spouse name: N/A   • Number of children: 0   • Years of education: H.S.     Occupational History   • N/A Disabled     Social History Main Topics   • Smoking status: Current Every Day Smoker     Packs/day: 0.50     Types: Cigarettes   • Smokeless tobacco: Never Used   • Alcohol use No   • Drug use: No   • Sexual activity: No     Other Topics Concern   • Not on file     Social History Narrative   • No narrative on file       Family History   Problem Relation Age of Onset   • Diabetes Mother    • Hypertension Mother    • Lung cancer Father    • Colon cancer Maternal Aunt    • Diabetes Other    • Hypertension Other    • Hyperlipidemia Other        Objective      Blood pressure 114/78, pulse 70, height 157.5 cm (62\"), weight 103 kg (226 lb).    Physical Exam   Constitutional: She is oriented to person, place, and time. She appears well-developed and well-nourished. No distress.   HENT:   Head: Normocephalic and atraumatic.   Mouth/Throat: Oropharynx is clear and moist.   Eyes: Pupils are equal, round, and reactive to light. No scleral icterus.   Neck: Neck supple. No JVD present. No tracheal deviation present. No thyromegaly present.   Cardiovascular: Normal rate and regular rhythm.  Exam reveals no gallop and no friction rub.    Murmur heard.  Pulmonary/Chest: Effort normal and breath sounds normal. No respiratory distress. She has no wheezes. She has no rales.   Abdominal: Soft. Bowel sounds are normal. She exhibits no distension and no mass. There is no tenderness. There is no rebound and no guarding.   Musculoskeletal: Normal range of motion. She exhibits edema and deformity.        Arms:  Trace edema bilateral lower extremities  RUE is surgically absent   Lymphadenopathy:     She has no cervical adenopathy.   Neurological: She is alert and oriented to person, " place, and time. No cranial nerve deficit.   Skin: Skin is warm and dry. No rash noted. She is not diaphoretic.   Psychiatric: She has a normal mood and affect.   Nursing note and vitals reviewed.        ECG 12 Lead  Date/Time: 10/12/2018 2:39 PM  Performed by: MILEY JERNIGAN  Authorized by: MILEY JERNIGAN   Comparison: compared with previous ECG from 7/31/2017  Rhythm: sinus rhythm  Clinical impression: normal ECG            Lab Review:   Lab Results   Component Value Date    GLUCOSE 90 08/31/2018    BUN 11 08/31/2018    CREATININE 0.66 08/31/2018    EGFRIFNONA 93 08/31/2018    EGFRIFAFRI 112 08/31/2018    BCR 16.7 08/31/2018    CO2 32.0 (H) 08/31/2018    CALCIUM 9.9 08/31/2018    ALBUMIN 4.84 (H) 08/31/2018    AST 17 08/31/2018    ALT 23 08/31/2018       Lab Results   Component Value Date    WBC 8.45 08/31/2018    HGB 14.2 08/31/2018    HCT 44.1 (H) 08/31/2018    MCV 94.0 08/31/2018     08/31/2018       Lab Results   Component Value Date    HGBA1C 6.30 (H) 08/31/2018       Lab Results   Component Value Date    TSH 2.530 08/31/2018       Lab Results   Component Value Date    CHOL 253 (H) 08/31/2018    CHOL 266 (H) 07/03/2017     Lab Results   Component Value Date    TRIG 202 (H) 08/31/2018    TRIG 249 (H) 07/03/2017     Lab Results   Component Value Date    HDL 49 08/31/2018    HDL 53 07/03/2017     Lab Results   Component Value Date     (H) 08/31/2018     (H) 07/03/2017     Assessment:   Diagnosis Plan   1. Murmur, heart  Adult Transthoracic Echo Complete W/ Cont if Necessary Per Protocol   2. Essential hypertension  Well controlled current medical regimen    3. Familial hypercholesterolemia  Lipid Panel    Comprehensive Metabolic Panel  Lipitor 40 mg daily    4. Tobacco abuse     5. Tobacco abuse counseling  Counseling less than 5 minutes    6.  Prediabetes     dietary counseling, I recommended that she follow-up with her primary care physician within the next month    Plan:  Further workup and  recommendations as above.  Follow-up in 3 months, sooner as needed.  Thank you for allowing us to participate in the care of your patient.     Scribed for Benjamín Martinez MD by Mnig Vargas PA-C. 10/12/2018  9:10 AM     I, Benjamín Martinez MD, personally performed the services described in this documentation as scribed by the above named individual in my presence, and it is both accurate and complete.  10/12/2018  10:13 AM

## 2018-10-12 ENCOUNTER — OFFICE VISIT (OUTPATIENT)
Dept: CARDIOLOGY | Facility: CLINIC | Age: 58
End: 2018-10-12

## 2018-10-12 VITALS
DIASTOLIC BLOOD PRESSURE: 78 MMHG | HEART RATE: 70 BPM | SYSTOLIC BLOOD PRESSURE: 114 MMHG | HEIGHT: 62 IN | WEIGHT: 226 LBS | BODY MASS INDEX: 41.59 KG/M2

## 2018-10-12 DIAGNOSIS — I10 ESSENTIAL HYPERTENSION: ICD-10-CM

## 2018-10-12 DIAGNOSIS — Z71.6 TOBACCO ABUSE COUNSELING: ICD-10-CM

## 2018-10-12 DIAGNOSIS — R01.1 MURMUR, HEART: Primary | ICD-10-CM

## 2018-10-12 DIAGNOSIS — Z72.0 TOBACCO ABUSE: ICD-10-CM

## 2018-10-12 DIAGNOSIS — E78.01 FAMILIAL HYPERCHOLESTEROLEMIA: ICD-10-CM

## 2018-10-12 PROCEDURE — 99204 OFFICE O/P NEW MOD 45 MIN: CPT | Performed by: INTERNAL MEDICINE

## 2018-10-12 PROCEDURE — 93000 ELECTROCARDIOGRAM COMPLETE: CPT | Performed by: INTERNAL MEDICINE

## 2018-10-12 RX ORDER — SUMATRIPTAN 100 MG/1
100 TABLET, FILM COATED ORAL
COMMUNITY
End: 2019-11-13

## 2018-10-12 RX ORDER — ATORVASTATIN CALCIUM 40 MG/1
40 TABLET, FILM COATED ORAL DAILY
Qty: 90 TABLET | Refills: 3 | Status: SHIPPED | OUTPATIENT
Start: 2018-10-12 | End: 2019-01-22 | Stop reason: SDUPTHER

## 2018-10-16 ENCOUNTER — HOSPITAL ENCOUNTER (OUTPATIENT)
Dept: PHYSICAL THERAPY | Facility: HOSPITAL | Age: 58
Setting detail: THERAPIES SERIES
Discharge: HOME | End: 2018-10-16

## 2018-10-16 DIAGNOSIS — M54.2 NECK PAIN: Primary | ICD-10-CM

## 2018-10-16 PROCEDURE — 97110 THERAPEUTIC EXERCISES: CPT | Performed by: PHYSICAL THERAPIST
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
## 2018-10-22 RX ORDER — RANITIDINE 300 MG/1
TABLET ORAL
Qty: 60 TABLET | Refills: 2 | Status: SHIPPED | OUTPATIENT
Start: 2018-10-22 | End: 2018-10-29 | Stop reason: SDUPTHER

## 2018-10-29 RX ORDER — RANITIDINE 300 MG/1
300 TABLET ORAL 2 TIMES DAILY
Qty: 60 TABLET | Refills: 2 | Status: SHIPPED | OUTPATIENT
Start: 2018-10-29 | End: 2019-01-24 | Stop reason: SDUPTHER

## 2018-10-30 ENCOUNTER — HOSPITAL ENCOUNTER (OUTPATIENT)
Dept: PHYSICAL THERAPY | Facility: HOSPITAL | Age: 58
Setting detail: THERAPIES SERIES
Discharge: HOME | End: 2018-10-30

## 2018-10-30 DIAGNOSIS — M54.2 NECK PAIN: Primary | ICD-10-CM

## 2018-10-30 PROCEDURE — 97110 THERAPEUTIC EXERCISES: CPT | Performed by: PHYSICAL THERAPIST

## 2018-10-30 NOTE — THERAPY DISCHARGE NOTE
Outpatient Physical Therapy Ortho Treatment Note/Discharge Summary   [facility]     Patient Name: Dee Grider  : 1960  MRN: [id number]  Today's Date: 10/30/2018      Visit Date: 10/30/2018    Visit Dx:    ICD-10-CM ICD-9-CM   1. Neck pain M54.2 723.1       Patient Active Problem List   Diagnosis   • HTN (hypertension)   • Morbid obesity (CMS/HCC)   • MANUEL (obstructive sleep apnea)   • Seasonal allergies   • GERD (gastroesophageal reflux disease)   • Murmur, heart   • Migraine   • Prediabetes   • Familial hypercholesterolemia   • Tobacco abuse        Past Medical History:   Diagnosis Date   • Anemia    • Arthritis    • DDD (degenerative disc disease), lumbar    • Depression with anxiety    • GERD (gastroesophageal reflux disease)    • Heart murmur    • HTN (hypertension)    • Menopause    • Morbid obesity (CMS/HCC)    • MANUEL (obstructive sleep apnea)    • Osteoarthritis of both knees    • Phantom limb pain (CMS/HCC)    • Rheumatic fever    • Seasonal allergies         Past Surgical History:   Procedure Laterality Date   • ARM AMPUTATION Right    • COLONOSCOPY     • HYSTERECTOMY     • SHOULDER ARTHROSCOPY Left    • TONSILLECTOMY     • UPPER GASTROINTESTINAL ENDOSCOPY               PT Ortho     Row Name 10/30/18 1000       Subjective Comments    Subjective Comments Pt. continues to report intense pain in her ear, her left arm and her left leg.  She has tried the exercises but they do not seem to help.  She is schedule to see a neurologist in late November.  -MARIAN      User Key  (r) = Recorded By, (t) = Taken By, (c) = Cosigned By    Initials Name Provider Type    Adele Mosqueda, PT Physical Therapist                            PT Assessment/Plan     Row Name 10/30/18 1100          PT Assessment    Assessment Comments Pt. has had no relief of symptoms with conservative measures.  She is not able to tolerate PT treatment at this time.  -MARIAN        PT Plan    PT Plan Comments No  additional PT visits planned.  Pt. will see neurologist in late November.  -MARIAN       User Key  (r) = Recorded By, (t) = Taken By, (c) = Cosigned By    Initials Name Provider Type    Adele Mosqueda PT Physical Therapist                    Exercises     Row Name 10/30/18 1000             Subjective Comments    Subjective Comments Pt. continues to report intense pain in her ear, her left arm and her left leg.  She has tried the exercises but they do not seem to help.  She is schedule to see a neurologist in late November.  -MARIAN         Total Minutes    72323 - PT Therapeutic Exercise Minutes 15  -MARIAN         Exercise 1    Exercise Name 1 Reviewed current symptoms and existing exercises.  Tried CROM without relief of symptoms.  Pt. was not able to tolerate traction at last session and symptoms are no different today.  -MARIAN      Time 1 15  -MARIAN        User Key  (r) = Recorded By, (t) = Taken By, (c) = Cosigned By    Initials Name Provider Type    Adele Mosqueda PT Physical Therapist                                            Time Calculation:   Start Time: 1040  Total Timed Code Minutes- PT: 15 minute(s)  Therapy Suggested Charges     Code   Minutes Charges    57780 (CPT®) Hc Pt Neuromusc Re Education Ea 15 Min      42966 (CPT®) Hc Pt Ther Proc Ea 15 Min 15 1    04048 (CPT®) Hc Gait Training Ea 15 Min      86039 (CPT®) Hc Pt Therapeutic Act Ea 15 Min      84662 (CPT®) Hc Pt Manual Therapy Ea 15 Min      94047 (CPT®) Hc Pt Ther Massage- Per 15 Min      66998 (CPT®) Hc Pt Iontophoresis Ea 15 Min      77659 (CPT®) Hc Pt Elec Stim Ea-Per 15 Min      76829 (CPT®) Hc Pt Ultrasound Ea 15 Min      11319 (CPT®) Hc Pt Self Care/Mgmt/Train Ea 15 Min      94601 (CPT®) Hc Pt Prosthetic (S) Train Initial Encounter, Each 15 Min      55015 (CPT®) Hc Orthotic(S) Mgmt/Train Initial Encounter, Each 15min      30095 (CPT®) Hc Pt Aquatic Therapy Ea 15 Min      03582 (CPT®) Hc Pt Orthotic(S)/Prosthetic(S) Encounter, Each 15 Min        (CPT®) Hc Pt Electrical Stim Unattended      Total  15 1        Therapy Charges for Today     Code Description Service Date Service Provider Modifiers Qty    47826915357 HC PT THER PROC EA 15 MIN 10/30/2018 Adele Hayes, PT GP 1                OP PT Discharge Summary  Date of Discharge: 10/30/18  Reason for Discharge: Unable to participate  Outcomes Achieved: Unable to tolerate or actively participate in therapy  Discharge Destination: Home with home program  Discharge Instructions/Additional Comments: Pt. is scheduled to see neurologist in late November.  She may return with new order after further assessment by neurologist.      Adele Hayes, PT  10/30/2018

## 2018-10-30 NOTE — THERAPY TREATMENT NOTE
Outpatient Physical Therapy Ortho Treatment Note  [facility]     Patient Name: Dee Grider  : 1960  MRN: [id number]  Today's Date: 10/30/2018      Visit Date: 10/30/2018    Visit Dx:    ICD-10-CM ICD-9-CM   1. Neck pain M54.2 723.1       Patient Active Problem List   Diagnosis   • HTN (hypertension)   • Morbid obesity (CMS/HCC)   • MANUEL (obstructive sleep apnea)   • Seasonal allergies   • GERD (gastroesophageal reflux disease)   • Murmur, heart   • Migraine   • Prediabetes   • Familial hypercholesterolemia   • Tobacco abuse        Past Medical History:   Diagnosis Date   • Anemia    • Arthritis    • DDD (degenerative disc disease), lumbar    • Depression with anxiety    • GERD (gastroesophageal reflux disease)    • Heart murmur    • HTN (hypertension)    • Menopause    • Morbid obesity (CMS/HCC)    • MANUEL (obstructive sleep apnea)    • Osteoarthritis of both knees    • Phantom limb pain (CMS/HCC)    • Rheumatic fever    • Seasonal allergies         Past Surgical History:   Procedure Laterality Date   • ARM AMPUTATION Right    • COLONOSCOPY     • HYSTERECTOMY     • SHOULDER ARTHROSCOPY Left    • TONSILLECTOMY     • UPPER GASTROINTESTINAL ENDOSCOPY               PT Ortho     Row Name 10/30/18 1000       Subjective Comments    Subjective Comments Pt. continues to report intense pain in her ear, her left arm and her left leg.  She has tried the exercises but they do not seem to help.  She is schedule to see a neurologist in late November.  -MARIAN      User Key  (r) = Recorded By, (t) = Taken By, (c) = Cosigned By    Initials Name Provider Type    Adele Mosqueda, PT Physical Therapist                            PT Assessment/Plan     Row Name 10/30/18 1100          PT Assessment    Assessment Comments Pt. has had no relief of symptoms with conservative measures.  She is not able to tolerate PT treatment at this time.  -MARIAN        PT Plan    PT Plan Comments No additional PT  visits planned.  Pt. will see neurologist in late November.  -MARIAN       User Key  (r) = Recorded By, (t) = Taken By, (c) = Cosigned By    Initials Name Provider Type    Adele Mosqueda PT Physical Therapist                    Exercises     Row Name 10/30/18 1000             Subjective Comments    Subjective Comments Pt. continues to report intense pain in her ear, her left arm and her left leg.  She has tried the exercises but they do not seem to help.  She is schedule to see a neurologist in late November.  -MARIAN         Total Minutes    91015 - PT Therapeutic Exercise Minutes 15  -MARIAN         Exercise 1    Exercise Name 1 Reviewed current symptoms and existing exercises.  Tried CROM without relief of symptoms.  Pt. was not able to tolerate traction at last session and symptoms are no different today.  -MARIAN      Time 1 15  -MARIAN        User Key  (r) = Recorded By, (t) = Taken By, (c) = Cosigned By    Initials Name Provider Type    Adele Mosqueda PT Physical Therapist                                            Time Calculation:   Start Time: 1040  Total Timed Code Minutes- PT: 15 minute(s)  Therapy Suggested Charges     Code   Minutes Charges    64097 (CPT®) Hc Pt Neuromusc Re Education Ea 15 Min      95142 (CPT®) Hc Pt Ther Proc Ea 15 Min 15 1    16185 (CPT®) Hc Gait Training Ea 15 Min      79029 (CPT®) Hc Pt Therapeutic Act Ea 15 Min      94072 (CPT®) Hc Pt Manual Therapy Ea 15 Min      02849 (CPT®) Hc Pt Ther Massage- Per 15 Min      66714 (CPT®) Hc Pt Iontophoresis Ea 15 Min      75287 (CPT®) Hc Pt Elec Stim Ea-Per 15 Min      97544 (CPT®) Hc Pt Ultrasound Ea 15 Min      31336 (CPT®) Hc Pt Self Care/Mgmt/Train Ea 15 Min      17693 (CPT®) Hc Pt Prosthetic (S) Train Initial Encounter, Each 15 Min      95520 (CPT®) Hc Orthotic(S) Mgmt/Train Initial Encounter, Each 15min      93373 (CPT®) Hc Pt Aquatic Therapy Ea 15 Min      49311 (CPT®) Hc Pt Orthotic(S)/Prosthetic(S) Encounter, Each 15 Min        (CPT®) Hc Pt Electrical Stim Unattended      Total  15 1        Therapy Charges for Today     Code Description Service Date Service Provider Modifiers Qty    97411717945 HC PT THER PROC EA 15 MIN 10/30/2018 Adele Hayes, PT GP 1                    Adele Hayes, PT  10/30/2018

## 2018-11-05 ENCOUNTER — HOSPITAL ENCOUNTER (OUTPATIENT)
Dept: CARDIOLOGY | Facility: HOSPITAL | Age: 58
Discharge: HOME | End: 2018-11-05
Admitting: PHYSICIAN ASSISTANT

## 2018-11-05 LAB
BH CV ECHO MEAS - AI DEC SLOPE: 214.4 CM/SEC^2
BH CV ECHO MEAS - AI MAX PG: 78.8 MMHG
BH CV ECHO MEAS - AI MAX VEL: 443.9 CM/SEC
BH CV ECHO MEAS - AI P1/2T: 606.5 MSEC
BH CV ECHO MEAS - AO ROOT AREA (BSA CORRECTED): 1.4
BH CV ECHO MEAS - AO ROOT AREA: 6.3 CM^2
BH CV ECHO MEAS - AO ROOT DIAM: 2.8 CM
BH CV ECHO MEAS - BSA(HAYCOCK): 2.2 M^2
BH CV ECHO MEAS - BSA: 2 M^2
BH CV ECHO MEAS - BZI_BMI: 41.3 KILOGRAMS/M^2
BH CV ECHO MEAS - BZI_METRIC_HEIGHT: 157.5 CM
BH CV ECHO MEAS - BZI_METRIC_WEIGHT: 102.5 KG
BH CV ECHO MEAS - EDV(CUBED): 65.1 ML
BH CV ECHO MEAS - EDV(MOD-SP2): 72 ML
BH CV ECHO MEAS - EDV(MOD-SP4): 65 ML
BH CV ECHO MEAS - EDV(TEICH): 70.9 ML
BH CV ECHO MEAS - EF(CUBED): 82.1 %
BH CV ECHO MEAS - EF(MOD-BP): 61 %
BH CV ECHO MEAS - EF(MOD-SP2): 58.3 %
BH CV ECHO MEAS - EF(MOD-SP4): 58.5 %
BH CV ECHO MEAS - EF(TEICH): 75.3 %
BH CV ECHO MEAS - ESV(CUBED): 11.7 ML
BH CV ECHO MEAS - ESV(MOD-SP2): 30 ML
BH CV ECHO MEAS - ESV(MOD-SP4): 27 ML
BH CV ECHO MEAS - ESV(TEICH): 17.5 ML
BH CV ECHO MEAS - FS: 43.6 %
BH CV ECHO MEAS - IVS/LVPW: 1
BH CV ECHO MEAS - IVSD: 1.1 CM
BH CV ECHO MEAS - LA DIMENSION: 3.5 CM
BH CV ECHO MEAS - LA/AO: 1.2
BH CV ECHO MEAS - LAD MAJOR: 5.2 CM
BH CV ECHO MEAS - LAT PEAK E' VEL: 7.8 CM/SEC
BH CV ECHO MEAS - LATERAL E/E' RATIO: 10.3
BH CV ECHO MEAS - LV DIASTOLIC VOL/BSA (35-75): 32.3 ML/M^2
BH CV ECHO MEAS - LV MASS(C)D: 139.1 GRAMS
BH CV ECHO MEAS - LV MASS(C)DI: 69.1 GRAMS/M^2
BH CV ECHO MEAS - LV SYSTOLIC VOL/BSA (12-30): 13.4 ML/M^2
BH CV ECHO MEAS - LVIDD: 4 CM
BH CV ECHO MEAS - LVIDS: 2.3 CM
BH CV ECHO MEAS - LVLD AP2: 7.1 CM
BH CV ECHO MEAS - LVLD AP4: 6 CM
BH CV ECHO MEAS - LVLS AP2: 5.1 CM
BH CV ECHO MEAS - LVLS AP4: 5.6 CM
BH CV ECHO MEAS - LVPWD: 1.1 CM
BH CV ECHO MEAS - MED PEAK E' VEL: 7.2 CM/SEC
BH CV ECHO MEAS - MEDIAL E/E' RATIO: 11.1
BH CV ECHO MEAS - MV A MAX VEL: 107.1 CM/SEC
BH CV ECHO MEAS - MV E MAX VEL: 81.9 CM/SEC
BH CV ECHO MEAS - MV E/A: 0.76
BH CV ECHO MEAS - PA ACC SLOPE: 634.3 CM/SEC^2
BH CV ECHO MEAS - PA ACC TIME: 0.14 SEC
BH CV ECHO MEAS - PA PR(ACCEL): 15.6 MMHG
BH CV ECHO MEAS - RAP SYSTOLE: 3 MMHG
BH CV ECHO MEAS - RVSP: 26 MMHG
BH CV ECHO MEAS - SI(CUBED): 26.5 ML/M^2
BH CV ECHO MEAS - SI(MOD-SP2): 20.9 ML/M^2
BH CV ECHO MEAS - SI(MOD-SP4): 18.9 ML/M^2
BH CV ECHO MEAS - SI(TEICH): 26.5 ML/M^2
BH CV ECHO MEAS - SV(CUBED): 53.4 ML
BH CV ECHO MEAS - SV(MOD-SP2): 42 ML
BH CV ECHO MEAS - SV(MOD-SP4): 38 ML
BH CV ECHO MEAS - SV(TEICH): 53.4 ML
BH CV ECHO MEAS - TAPSE (>1.6): 1.5 CM2
BH CV ECHO MEAS - TR MAX PG: 23 MMHG
BH CV ECHO MEAS - TR MAX VEL: 236.7 CM/SEC
BH CV ECHO MEASUREMENTS AVERAGE E/E' RATIO: 10.92
BH CV XLRA - RV BASE: 3.7 CM
BH CV XLRA - RV LENGTH: 6.3 CM
BH CV XLRA - RV MID: 2.8 CM
BH CV XLRA - TDI S': 12.3 CM/SEC
LEFT ATRIUM VOLUME INDEX: 20.4 ML/M^2
LV EF 2D ECHO EST: 60 %
MAXIMAL PREDICTED HEART RATE: 164 BPM
STRESS TARGET HR: 139 BPM

## 2018-11-05 PROCEDURE — 93306 TTE W/DOPPLER COMPLETE: CPT

## 2018-11-05 PROCEDURE — 93306 TTE W/DOPPLER COMPLETE: CPT | Performed by: INTERNAL MEDICINE

## 2019-01-18 ENCOUNTER — LAB (OUTPATIENT)
Dept: LAB | Facility: HOSPITAL | Age: 59
End: 2019-01-18

## 2019-01-18 DIAGNOSIS — E78.01 FAMILIAL HYPERCHOLESTEROLEMIA: ICD-10-CM

## 2019-01-18 LAB
ALBUMIN SERPL-MCNC: 4.18 G/DL (ref 3.2–4.8)
ALBUMIN/GLOB SERPL: 2.1 G/DL (ref 1.5–2.5)
ALP SERPL-CCNC: 110 U/L (ref 25–100)
ALT SERPL W P-5'-P-CCNC: 29 U/L (ref 7–40)
ANION GAP SERPL CALCULATED.3IONS-SCNC: 8 MMOL/L (ref 3–11)
ARTICHOKE IGE QN: 85 MG/DL (ref 0–130)
AST SERPL-CCNC: 22 U/L (ref 0–33)
BILIRUB SERPL-MCNC: 0.3 MG/DL (ref 0.3–1.2)
BUN BLD-MCNC: 14 MG/DL (ref 9–23)
BUN/CREAT SERPL: 22.2 (ref 7–25)
CALCIUM SPEC-SCNC: 8.8 MG/DL (ref 8.7–10.4)
CHLORIDE SERPL-SCNC: 104 MMOL/L (ref 99–109)
CHOLEST SERPL-MCNC: 138 MG/DL (ref 0–200)
CO2 SERPL-SCNC: 26 MMOL/L (ref 20–31)
CREAT BLD-MCNC: 0.63 MG/DL (ref 0.6–1.3)
GFR SERPL CREATININE-BSD FRML MDRD: 118 ML/MIN/1.73
GFR SERPL CREATININE-BSD FRML MDRD: 98 ML/MIN/1.73
GLOBULIN UR ELPH-MCNC: 2 GM/DL
GLUCOSE BLD-MCNC: 103 MG/DL (ref 70–100)
HDLC SERPL-MCNC: 46 MG/DL (ref 40–60)
POTASSIUM BLD-SCNC: 3.8 MMOL/L (ref 3.5–5.5)
PROT SERPL-MCNC: 6.2 G/DL (ref 5.7–8.2)
SODIUM BLD-SCNC: 138 MMOL/L (ref 132–146)
TRIGL SERPL-MCNC: 70 MG/DL (ref 0–150)

## 2019-01-18 PROCEDURE — 80053 COMPREHEN METABOLIC PANEL: CPT

## 2019-01-18 PROCEDURE — 36415 COLL VENOUS BLD VENIPUNCTURE: CPT

## 2019-01-18 PROCEDURE — 80061 LIPID PANEL: CPT

## 2019-01-22 RX ORDER — ATORVASTATIN CALCIUM 80 MG/1
80 TABLET, FILM COATED ORAL DAILY
Qty: 90 TABLET | Refills: 3 | Status: SHIPPED | OUTPATIENT
Start: 2019-01-22 | End: 2020-01-22

## 2019-01-24 RX ORDER — RANITIDINE 300 MG/1
TABLET ORAL
Qty: 60 TABLET | Refills: 2 | Status: SHIPPED | OUTPATIENT
Start: 2019-01-24 | End: 2019-04-17 | Stop reason: SDUPTHER

## 2019-01-25 ENCOUNTER — OFFICE VISIT (OUTPATIENT)
Dept: CARDIOLOGY | Facility: CLINIC | Age: 59
End: 2019-01-25

## 2019-01-25 VITALS
BODY MASS INDEX: 39.82 KG/M2 | DIASTOLIC BLOOD PRESSURE: 83 MMHG | OXYGEN SATURATION: 98 % | WEIGHT: 216.4 LBS | SYSTOLIC BLOOD PRESSURE: 117 MMHG | HEART RATE: 73 BPM | HEIGHT: 62 IN

## 2019-01-25 DIAGNOSIS — I10 ESSENTIAL HYPERTENSION: ICD-10-CM

## 2019-01-25 DIAGNOSIS — E78.01 FAMILIAL HYPERCHOLESTEROLEMIA: ICD-10-CM

## 2019-01-25 DIAGNOSIS — R01.1 MURMUR, HEART: Primary | ICD-10-CM

## 2019-01-25 PROCEDURE — 99214 OFFICE O/P EST MOD 30 MIN: CPT | Performed by: INTERNAL MEDICINE

## 2019-01-25 PROCEDURE — 99406 BEHAV CHNG SMOKING 3-10 MIN: CPT | Performed by: INTERNAL MEDICINE

## 2019-01-25 NOTE — PROGRESS NOTES
Encounter Date:01/25/2019      Patient ID: Dee Grider is a 58 y.o. female.    Chief Complaint: Hypertension      PROBLEM LIST:  1. Cardiac murmur:  a. Echocardiogram, 11/05/2018: EF 60%. LV diastolic dysfunction (grade I) c/w impaired relaxation. Mild AI.   2. Hypertension.  3. Familial hypercholesterolemia.  4. Morbid obesity.  5. Tobacco abuse.  6. MANUEL.  7. Pre-diabetes.  8. Seasonal allergies.  9. GERD.    History of Present Illness  Patient presents today for 3 month follow-up with a history of cardiac murmur and cardiac risk factors. Since last visit, she has been doing well overall from a cardiovascular standpoint. Denies chest pain, shortness of breath, leg swelling, palpitations, and syncope. Patient admits she does not have a regular exercise regimen, and she does smoke about 6 cigarettes throughout the day.    Allergies   Allergen Reactions   • Aspirin Nausea And Vomiting         Current Outpatient Medications:   •  atorvastatin (LIPITOR) 80 MG tablet, Take 1 tablet by mouth Daily., Disp: 90 tablet, Rfl: 3  •  Cetirizine HCl (ZYRTEC ALLERGY PO), Take  by mouth., Disp: , Rfl:   •  fluticasone (FLONASE) 50 MCG/ACT nasal spray, ADMINISTER 2 SPRAYS IN EACH NOSTRIL ONCE DAILY., Disp: 16 mL, Rfl: 5  •  metoprolol tartrate (LOPRESSOR) 25 MG tablet, Take 1 tablet by mouth 2 (Two) Times a Day., Disp: 60 tablet, Rfl: 12  •  raNITIdine (ZANTAC) 300 MG tablet, TAKE 1 TABLET BY MOUTH TWICE A DAY, Disp: 60 tablet, Rfl: 2  •  SUMAtriptan (IMITREX) 100 MG tablet, Take one tablet at onset of headache. May repeat dose one time in 2 hours if headache not relieved. Take one tablet at onset of headache. May repeat dose one time in 2 hours if headache not relieved., Disp: , Rfl:     The following portions of the patient's history were reviewed and updated as appropriate: allergies, current medications, past family history, past medical history, past social history, past surgical history and problem  "list.    ROS  Review of Systems   Constitution: Negative for chills, fever, weight gain and weight loss.   Cardiovascular: Negative for chest pain, claudication, dyspnea on exertion, leg swelling, orthopnea, palpitations, paroxysmal nocturnal dyspnea and syncope.        No dizziness   Gastrointestinal: Negative for abdominal pain, constipation, diarrhea, nausea and vomiting.   Genitourinary:        No urinary symptoms   Neurological:        No symptoms of stroke.   All other systems reviewed and are negative.    Objective:     Blood pressure 117/83, pulse 73, height 157.5 cm (62\"), weight 98.2 kg (216 lb 6.4 oz), SpO2 98 %.  Repeat blood pressure measurement by, Benjamín Martinez MD, at 120/78      Physical Exam  Constitutional: She appears well-developed and well-nourished.   HENT:   HEENT exam unremarkable.   Neck: Neck supple. No JVD present.   No carotid bruits.   Cardiovascular: Normal rate, regular rhythm and normal heart sounds.    Faint systolic ejection murmur heard.  2 plus symmetric pulses.   Pulmonary/Chest: Breath sounds normal. Does not exhibit tenderness.   Abdominal:   Abdomen benign.   Musculoskeletal: Does not exhibit edema.   Neurological:   Neurological exam unremarkable.   Vitals reviewed.    Lab Review:   Lab Results   Component Value Date    GLUCOSE 103 (H) 01/18/2019    BUN 14 01/18/2019    CREATININE 0.63 01/18/2019    EGFRIFNONA 98 01/18/2019    EGFRIFAFRI 118 01/18/2019    BCR 22.2 01/18/2019    K 3.8 01/18/2019    CO2 26.0 01/18/2019    CALCIUM 8.8 01/18/2019    ALBUMIN 4.18 01/18/2019    AST 22 01/18/2019    ALT 29 01/18/2019     Lab Results   Component Value Date    CHOL 138 01/18/2019    TRIG 70 01/18/2019    HDL 46 01/18/2019    LDL 85 01/18/2019     Procedures       Assessment:      Diagnosis Plan   1. Murmur, heart  Faint systolic ejection murmur hear on exam.   2. Essential hypertension  Well-controlled, continue current medications.   3. Familial hypercholesterolemia  Much better " controlled since she was started on atorvastatin. Continue atorvastatin 80 mg.   4. Tobacco abuse Smoking cessation counseling given > 3 minutes     Echocardiogram results were discussed with the patient, along with the importance of managing her blood pressure  Plan:   Weight loss and smoking cessation recommended.  Stable cardiac status.  Continue current medications.   FU in 12 MO, sooner as needed.  Thank you for allowing us to participate in the care of your patient.     Scribed for Benjamín Martinez MD by Marichuy Guerrier. 1/25/2019  1:41 PM      IBenjamín MD, personally performed the services described in this documentation as scribed by the above named individual in my presence, and it is both accurate and complete.  1/25/2019  2:09 PM        Please note that portions of this note may have been completed with a voice recognition program. Efforts were made to edit the dictations, but occasionally words are mistranscribed.

## 2019-03-26 DIAGNOSIS — J01.00 ACUTE NON-RECURRENT MAXILLARY SINUSITIS: ICD-10-CM

## 2019-03-26 RX ORDER — FLUTICASONE PROPIONATE 50 MCG
SPRAY, SUSPENSION (ML) NASAL
Qty: 48 ML | Refills: 0 | Status: SHIPPED | OUTPATIENT
Start: 2019-03-26 | End: 2019-03-29 | Stop reason: SDUPTHER

## 2019-03-29 DIAGNOSIS — J01.00 ACUTE NON-RECURRENT MAXILLARY SINUSITIS: ICD-10-CM

## 2019-03-29 RX ORDER — FLUTICASONE PROPIONATE 50 MCG
2 SPRAY, SUSPENSION (ML) NASAL DAILY
Qty: 3 BOTTLE | Refills: 1 | Status: SHIPPED | OUTPATIENT
Start: 2019-03-29 | End: 2021-10-06 | Stop reason: SDUPTHER

## 2019-04-17 RX ORDER — RANITIDINE 300 MG/1
TABLET ORAL
Qty: 60 TABLET | Refills: 2 | Status: SHIPPED | OUTPATIENT
Start: 2019-04-17 | End: 2019-05-17 | Stop reason: SDUPTHER

## 2019-05-17 RX ORDER — RANITIDINE 300 MG/1
TABLET ORAL
Qty: 60 TABLET | Refills: 0 | Status: SHIPPED | OUTPATIENT
Start: 2019-05-17 | End: 2019-07-04 | Stop reason: SDUPTHER

## 2019-07-06 RX ORDER — RANITIDINE 300 MG/1
TABLET ORAL
Qty: 60 TABLET | Refills: 1 | Status: SHIPPED | OUTPATIENT
Start: 2019-07-06 | End: 2019-07-29 | Stop reason: SDUPTHER

## 2019-07-30 RX ORDER — RANITIDINE 300 MG/1
TABLET ORAL
Qty: 60 TABLET | Refills: 1 | Status: SHIPPED | OUTPATIENT
Start: 2019-07-30 | End: 2019-09-03 | Stop reason: ALTCHOICE

## 2019-09-03 ENCOUNTER — OFFICE VISIT (OUTPATIENT)
Dept: FAMILY MEDICINE CLINIC | Facility: CLINIC | Age: 59
End: 2019-09-03

## 2019-09-03 VITALS
OXYGEN SATURATION: 97 % | WEIGHT: 223 LBS | RESPIRATION RATE: 20 BRPM | SYSTOLIC BLOOD PRESSURE: 126 MMHG | HEIGHT: 62 IN | BODY MASS INDEX: 41.04 KG/M2 | HEART RATE: 80 BPM | DIASTOLIC BLOOD PRESSURE: 68 MMHG | TEMPERATURE: 97.8 F

## 2019-09-03 DIAGNOSIS — I10 ESSENTIAL HYPERTENSION: ICD-10-CM

## 2019-09-03 DIAGNOSIS — K21.00 CHRONIC REFLUX ESOPHAGITIS: Primary | ICD-10-CM

## 2019-09-03 DIAGNOSIS — Z00.00 MEDICARE ANNUAL WELLNESS VISIT, SUBSEQUENT: ICD-10-CM

## 2019-09-03 DIAGNOSIS — R79.9 ABNORMAL FINDING OF BLOOD CHEMISTRY: ICD-10-CM

## 2019-09-03 LAB
ALBUMIN SERPL-MCNC: 4.6 G/DL (ref 3.5–5.2)
ALBUMIN/GLOB SERPL: 1.8 G/DL
ALP SERPL-CCNC: 100 U/L (ref 39–117)
ALT SERPL W P-5'-P-CCNC: 12 U/L (ref 1–33)
ANION GAP SERPL CALCULATED.3IONS-SCNC: 8.8 MMOL/L (ref 5–15)
AST SERPL-CCNC: 13 U/L (ref 1–32)
BASOPHILS # BLD AUTO: 0.05 10*3/MM3 (ref 0–0.2)
BASOPHILS NFR BLD AUTO: 0.6 % (ref 0–1.5)
BILIRUB BLD-MCNC: NEGATIVE MG/DL
BILIRUB SERPL-MCNC: 0.3 MG/DL (ref 0.2–1.2)
BUN BLD-MCNC: 9 MG/DL (ref 6–20)
BUN/CREAT SERPL: 16.4 (ref 7–25)
CALCIUM SPEC-SCNC: 9.5 MG/DL (ref 8.6–10.5)
CHLORIDE SERPL-SCNC: 101 MMOL/L (ref 98–107)
CHOLEST SERPL-MCNC: 153 MG/DL (ref 0–200)
CLARITY, POC: CLEAR
CO2 SERPL-SCNC: 29.2 MMOL/L (ref 22–29)
COLOR UR: YELLOW
CREAT BLD-MCNC: 0.55 MG/DL (ref 0.57–1)
CRP SERPL-MCNC: 0.26 MG/DL (ref 0–0.5)
DEPRECATED RDW RBC AUTO: 49 FL (ref 37–54)
EOSINOPHIL # BLD AUTO: 0.43 10*3/MM3 (ref 0–0.4)
EOSINOPHIL NFR BLD AUTO: 4.9 % (ref 0.3–6.2)
ERYTHROCYTE [DISTWIDTH] IN BLOOD BY AUTOMATED COUNT: 13.7 % (ref 12.3–15.4)
GFR SERPL CREATININE-BSD FRML MDRD: 114 ML/MIN/1.73
GFR SERPL CREATININE-BSD FRML MDRD: 138 ML/MIN/1.73
GLOBULIN UR ELPH-MCNC: 2.6 GM/DL
GLUCOSE BLD-MCNC: 95 MG/DL (ref 65–99)
GLUCOSE UR STRIP-MCNC: NEGATIVE MG/DL
HBA1C MFR BLD: 6 % (ref 4.8–5.6)
HCT VFR BLD AUTO: 43.3 % (ref 34–46.6)
HDLC SERPL-MCNC: 46 MG/DL (ref 40–60)
HGB BLD-MCNC: 13.6 G/DL (ref 12–15.9)
IMM GRANULOCYTES # BLD AUTO: 0.04 10*3/MM3 (ref 0–0.05)
IMM GRANULOCYTES NFR BLD AUTO: 0.5 % (ref 0–0.5)
KETONES UR QL: NEGATIVE
LDLC SERPL CALC-MCNC: 85 MG/DL (ref 0–100)
LDLC/HDLC SERPL: 1.86 {RATIO}
LEUKOCYTE EST, POC: NEGATIVE
LYMPHOCYTES # BLD AUTO: 3.46 10*3/MM3 (ref 0.7–3.1)
LYMPHOCYTES NFR BLD AUTO: 39.4 % (ref 19.6–45.3)
MCH RBC QN AUTO: 30.4 PG (ref 26.6–33)
MCHC RBC AUTO-ENTMCNC: 31.4 G/DL (ref 31.5–35.7)
MCV RBC AUTO: 96.9 FL (ref 79–97)
MONOCYTES # BLD AUTO: 0.43 10*3/MM3 (ref 0.1–0.9)
MONOCYTES NFR BLD AUTO: 4.9 % (ref 5–12)
NEUTROPHILS # BLD AUTO: 4.38 10*3/MM3 (ref 1.7–7)
NEUTROPHILS NFR BLD AUTO: 49.7 % (ref 42.7–76)
NITRITE UR-MCNC: NEGATIVE MG/ML
NRBC BLD AUTO-RTO: 0 /100 WBC (ref 0–0.2)
PH UR: 5.5 [PH] (ref 5–8)
PLATELET # BLD AUTO: 266 10*3/MM3 (ref 140–450)
PMV BLD AUTO: 11 FL (ref 6–12)
POC CREATININE URINE: 200
POC MICROALBUMIN URINE: 10
POTASSIUM BLD-SCNC: 4.3 MMOL/L (ref 3.5–5.2)
PROT SERPL-MCNC: 7.2 G/DL (ref 6–8.5)
PROT UR STRIP-MCNC: NEGATIVE MG/DL
RBC # BLD AUTO: 4.47 10*6/MM3 (ref 3.77–5.28)
RBC # UR STRIP: NEGATIVE /UL
SODIUM BLD-SCNC: 139 MMOL/L (ref 136–145)
SP GR UR: 1.02 (ref 1–1.03)
TRIGL SERPL-MCNC: 108 MG/DL (ref 0–150)
TSH SERPL DL<=0.05 MIU/L-ACNC: 2.1 UIU/ML (ref 0.27–4.2)
URATE SERPL-MCNC: 4.6 MG/DL (ref 2.4–5.7)
UROBILINOGEN UR QL: NORMAL
VLDLC SERPL-MCNC: 21.6 MG/DL (ref 5–40)
WBC NRBC COR # BLD: 8.79 10*3/MM3 (ref 3.4–10.8)

## 2019-09-03 PROCEDURE — G0439 PPPS, SUBSEQ VISIT: HCPCS | Performed by: FAMILY MEDICINE

## 2019-09-03 PROCEDURE — 81003 URINALYSIS AUTO W/O SCOPE: CPT | Performed by: FAMILY MEDICINE

## 2019-09-03 PROCEDURE — 86140 C-REACTIVE PROTEIN: CPT | Performed by: FAMILY MEDICINE

## 2019-09-03 PROCEDURE — 83036 HEMOGLOBIN GLYCOSYLATED A1C: CPT | Performed by: FAMILY MEDICINE

## 2019-09-03 PROCEDURE — 85025 COMPLETE CBC W/AUTO DIFF WBC: CPT | Performed by: FAMILY MEDICINE

## 2019-09-03 PROCEDURE — 80053 COMPREHEN METABOLIC PANEL: CPT | Performed by: FAMILY MEDICINE

## 2019-09-03 PROCEDURE — 84443 ASSAY THYROID STIM HORMONE: CPT | Performed by: FAMILY MEDICINE

## 2019-09-03 PROCEDURE — 84550 ASSAY OF BLOOD/URIC ACID: CPT | Performed by: FAMILY MEDICINE

## 2019-09-03 PROCEDURE — 36415 COLL VENOUS BLD VENIPUNCTURE: CPT | Performed by: FAMILY MEDICINE

## 2019-09-03 PROCEDURE — 82044 UR ALBUMIN SEMIQUANTITATIVE: CPT | Performed by: FAMILY MEDICINE

## 2019-09-03 PROCEDURE — 80061 LIPID PANEL: CPT | Performed by: FAMILY MEDICINE

## 2019-09-03 RX ORDER — RANITIDINE 300 MG/1
TABLET ORAL
Qty: 60 TABLET | Refills: 1 | OUTPATIENT
Start: 2019-09-03

## 2019-09-03 RX ORDER — PANTOPRAZOLE SODIUM 40 MG/1
40 TABLET, DELAYED RELEASE ORAL DAILY
Qty: 90 TABLET | Refills: 1 | Status: SHIPPED | OUTPATIENT
Start: 2019-09-03 | End: 2020-02-21 | Stop reason: SDUPTHER

## 2019-09-03 NOTE — PROGRESS NOTES
The ABCs of the Annual Wellness Visit    HEALTH RISK ASSESSMENT  SUBSEQUENT Medicare Wellness Visit   1960    Recent Hospitalizations: NONE    Current Medical Providers: Patient Care Team:  Tristan Isbell MD as PCP - Family Medicine    Smoking Status   Social History    Tobacco Use      Smoking status: Current Every Day Smoker        Packs/day: 0.25        Types: Cigarettes      Smokeless tobacco: Never Used    Alcohol Consumption    reports that she does not drink alcohol.    Depression Screen   PHQ-2 Depression Screening  Little interest or pleasure in doing things? 0   Feeling down, depressed, or hopeless? 0   PHQ-2 Total Score 0        Health Habits and Functional and Cognitive Screening  Functional & Cognitive Status 9/3/2019   Do you have difficulty preparing food and eating? No   Do you have difficulty bathing yourself, getting dressed or grooming yourself? No   Do you have difficulty using the toilet? No   Do you have difficulty moving around from place to place? No   Do you have trouble with steps or getting out of a bed or a chair? No   Current Diet Well Balanced Diet   Dental Exam Up to date   Eye Exam Up to date   Exercise (times per week) 7 times per week   Current Exercise Activities Include Walking   Do you need help using the phone?  No   Are you deaf or do you have serious difficulty hearing?  No   Do you need help with transportation? No   Do you need help shopping? No   Do you need help preparing meals?  No   Do you need help with housework?  No   Do you need help with laundry? No   Do you need help taking your medications? No   Do you need help managing money? No   Do you ever drive or ride in a car without wearing a seat belt? No   Have you felt unusual stress, anger or loneliness in the last month? No   Who do you live with? Other   If you need help, do you have trouble finding someone available to you? No   Have you been bothered in the last four weeks by sexual problems? No   Do you have  difficulty concentrating, remembering or making decisions? No     Fall Risk Assessment  STEADI Fall Risk Assessment has not been completed.    Does the patient have evidence of cognitive impairment? No    Recent Lab Results:  Lab Results   Component Value Date    BUN 14 01/18/2019    CREATININE 0.63 01/18/2019    EGFRIFNONA 98 01/18/2019    EGFRIFAFRI 118 01/18/2019    BCR 22.2 01/18/2019     01/18/2019    K 3.8 01/18/2019    CO2 26.0 01/18/2019    CALCIUM 8.8 01/18/2019    ALBUMIN 4.18 01/18/2019    BILITOT 0.3 01/18/2019    ALKPHOS 110 (H) 01/18/2019    AST 22 01/18/2019    ALT 29 01/18/2019       Lab Results   Component Value Date    TRIG 70 01/18/2019    HDL 46 01/18/2019       Lab Results   Component Value Date    HGBA1C 6.30 (H) 08/31/2018       Subjective     History of Present Illness  Dee Grider is a 59 y.o. female who presents for an Subsequent Wellness Visit.  She describes ongoing care with her pain specialist, cardiologist and gastroenterologist.  She is needing a referral to GI to discuss her chronic reflux.  She is taking maximum doses of ranitidine with breakthrough indigestion worse at night.  She denies dysphagia, odynophagia, melena or crescendo abdominal pain.  She denies chest pain or cardiac symptoms.    Review of Systems   Constitutional: Negative.    HENT: Negative.    Eyes: Negative.    Respiratory: Negative.    Cardiovascular: Negative.    Gastrointestinal: Negative.         Chronic reflux   Endocrine: Negative.    Genitourinary: Negative.    Musculoskeletal: Positive for arthralgias, back pain and neck pain.   Skin: Negative.    Allergic/Immunologic: Negative.    Neurological: Negative.    Hematological: Negative.    Psychiatric/Behavioral: Negative.      The following portions of the patient's history were reviewed and updated as appropriate: past medical history, past surgical history, allergies, current medications, past family history and social history.       Current  "Outpatient Medications:   •  atorvastatin (LIPITOR) 80 MG tablet, Take 1 tablet by mouth Daily., Disp: 90 tablet, Rfl: 3  •  Cetirizine HCl (ZYRTEC ALLERGY PO), Take  by mouth., Disp: , Rfl:   •  fluticasone (FLONASE) 50 MCG/ACT nasal spray, 2 sprays into the nostril(s) as directed by provider Daily., Disp: 3 bottle, Rfl: 1  •  metoprolol tartrate (LOPRESSOR) 25 MG tablet, Take 1 tablet by mouth 2 (Two) Times a Day., Disp: 60 tablet, Rfl: 12  •  SUMAtriptan (IMITREX) 100 MG tablet, Take one tablet at onset of headache. May repeat dose one time in 2 hours if headache not relieved. Take one tablet at onset of headache. May repeat dose one time in 2 hours if headache not relieved., Disp: , Rfl:   •  pantoprazole (PROTONIX) 40 MG EC tablet, Take 1 tablet by mouth Daily., Disp: 90 tablet, Rfl: 1    Objective     Visual Acuity    Visual Acuity Screening    Right eye Left eye Both eyes   Without correction:      With correction: 20/20 20/20 20/20     Vitals:    09/03/19 1008   BP: 126/68   Pulse: 80   Resp: 20   Temp: 97.8 °F (36.6 °C)   TempSrc: Temporal   SpO2: 97%   Weight: 101 kg (223 lb)   Height: 157.5 cm (62\")   PainSc:   7   PainLoc: Shoulder     Body mass index is 40.79 kg/m². Discussed the patient's BMI with her. The BMI is above average; BMI management plan is completed.    Physical Exam   Constitutional: She is oriented to person, place, and time. She appears well-developed and well-nourished. She is cooperative.   HENT:   Head: Normocephalic and atraumatic.   Right Ear: Hearing and external ear normal.   Left Ear: Hearing and external ear normal.   Nose: Nose normal.   Mouth/Throat: Uvula is midline, oropharynx is clear and moist and mucous membranes are normal.   Eyes: Conjunctivae and EOM are normal. Pupils are equal, round, and reactive to light. No scleral icterus.   Neck: Trachea normal and normal range of motion. Neck supple. No JVD present. Carotid bruit is not present. No thyromegaly present. "   Cardiovascular: Normal rate, regular rhythm, normal heart sounds and intact distal pulses.   Pulmonary/Chest: Effort normal and breath sounds normal.   Abdominal: Soft. Bowel sounds are normal. There is no hepatosplenomegaly. There is no tenderness.   Musculoskeletal: Normal range of motion.   Right UE amputation   Lymphadenopathy:     She has no cervical adenopathy.   Neurological: She is alert and oriented to person, place, and time. She has normal strength and normal reflexes. No sensory deficit. Gait normal.   Skin: Skin is warm and dry.   Psychiatric: She has a normal mood and affect. Her speech is normal and behavior is normal. Judgment and thought content normal. Cognition and memory are normal.   Nursing note and vitals reviewed.      Compared to one year ago, the patient feels her physical health is the same.  Compared to one year ago, the patient feels her mental health is the same.    Age-appropriate Screening Schedule  Refer to the list below for future screening recommendations based on patient's age, sex and/or medical conditions. Orders for these recommended tests are listed in the plan section. The patient has been provided with a written plan.    Health Maintenance   Topic Date Due   • INFLUENZA VACCINE  09/27/2019    • LIPID PANEL  01/18/2020   • PAP SMEAR  07/09/2021   • MAMMOGRAM  08/12/2021   • COLONOSCOPY  10/01/2028   • PNEUMOCOCCAL VACCINE (19-64 MEDIUM RISK)  Patient declined today       Advance Care Planning  We discussed advance care planning and importance of providing & updating documentation for the medical record.    Identification of Risk Factors  Cardiovascular risk  Chronic Pain   Diabetic Lab Screening   Immunizations Discussed/Encouraged (specific immunizations; Pneumococcal 23 )  Obesity/Overweight .      Assessment/Plan   Patient Self-Management and Personalized Health Advice  The patient has been provided with information about: diet, exercise, weight management, prevention  of cardiac or vascular disease, the relationship between weight and GERD, fall prevention and designing advance directives and preventive services including:   · Annual Wellness Visit (AWV).    Visit Diagnoses:    ICD-10-CM ICD-9-CM   1. Chronic reflux esophagitis K21.0 530.11   2. Medicare annual wellness visit, subsequent Z00.00 V70.0   3. Essential hypertension I10 401.9   4. Abnormal finding of blood chemistry  R79.9 790.6       Orders Placed This Encounter   Procedures   • Comprehensive Metabolic Panel   • Lipid Panel   • TSH   • Uric Acid   • C-reactive Protein   • Hemoglobin A1c   • Ambulatory Referral to Gastroenterology   • POC Urinalysis Dipstick, Automated   • POC Microalbumin   • CBC & Differential       New Medications Ordered This Visit   Medications   • metoprolol tartrate (LOPRESSOR) 25 MG tablet     Sig: Take 1 tablet by mouth 2 (Two) Times a Day.     Dispense:  60 tablet     Refill:  12   • pantoprazole (PROTONIX) 40 MG EC tablet     Sig: Take 1 tablet by mouth Daily.     Dispense:  90 tablet     Refill:  1       Current outpatient and discharge medications have been reconciled for the patient.  Reviewed by: Tristan Isbell MD    Aspirin counseling:  We discussed current recommendations concerning daily low dose aspirin (81 mg) use in patients with cardiovascular disease.    Reviewed use of high risk medication in the elderly: Not applicable.  Reviewed for potential of harmful drug interactions in the elderly: Not applicable.    Follow Up:  Return in about 1 year (around 9/3/2020), or if symptoms worsen or fail to improve, for Annual.     An After Visit Summary and PPPS with all of these plans were given to the patient.         Tristan Isbell MD 09/03/19 10:41 AM

## 2019-10-02 ENCOUNTER — OFFICE VISIT (OUTPATIENT)
Dept: GASTROENTEROLOGY | Facility: CLINIC | Age: 59
End: 2019-10-02

## 2019-10-02 VITALS
DIASTOLIC BLOOD PRESSURE: 78 MMHG | OXYGEN SATURATION: 98 % | HEART RATE: 84 BPM | HEIGHT: 62 IN | SYSTOLIC BLOOD PRESSURE: 100 MMHG | TEMPERATURE: 96.7 F | BODY MASS INDEX: 40.78 KG/M2 | WEIGHT: 221.6 LBS

## 2019-10-02 DIAGNOSIS — K21.9 CHRONIC GERD: Primary | ICD-10-CM

## 2019-10-02 DIAGNOSIS — Z86.010 HISTORY OF COLON POLYPS: ICD-10-CM

## 2019-10-02 DIAGNOSIS — Z51.81 ENCOUNTER FOR MONITORING LONG-TERM PROTON PUMP INHIBITOR THERAPY: ICD-10-CM

## 2019-10-02 DIAGNOSIS — Z80.0 FAMILY HISTORY OF COLON CANCER: ICD-10-CM

## 2019-10-02 DIAGNOSIS — E66.01 OBESITY, CLASS III, BMI 40-49.9 (MORBID OBESITY) (HCC): ICD-10-CM

## 2019-10-02 DIAGNOSIS — Z79.899 ENCOUNTER FOR MONITORING LONG-TERM PROTON PUMP INHIBITOR THERAPY: ICD-10-CM

## 2019-10-02 PROCEDURE — 99214 OFFICE O/P EST MOD 30 MIN: CPT | Performed by: NURSE PRACTITIONER

## 2019-10-02 NOTE — PROGRESS NOTES
GASTROENTEROLOGY OUTPATIENT ESTABLISHED PATIENT NOTE  Patient: ROMA CABALLERO : 1960  Date of Service: 10/02/2019  Dear Dr Isbell  Thank you for your referral of this patient for evaluation of GERD  CC: Consult (Egd) and Heartburn    Assessment/Plan                                             ASSESSMENT & PLANS     Chronic GERD worsening sx despite PPI  -     EGD w/ Dr Goldstein.  To be done in the hospital d/t high BMI    Encounter for monitoring long-term proton pump inhibitor therapy  -     I explained to pt long-term side effects of any PPI, which include but not limited to, increase risks for diarrhea (CDiff, microscopic colitis, etc), decreased absorption of certain vitamin and mineral, kidney disease, etc. I encourage pt of conservative nonpharmacologic measures (anti-reflux lifestyles and dietary changes)       History of colon polyps  Colon cancer in her maternal aunt  · Repeat colonoscopy by Oct 2021    Obesity, Class III, BMI 40-49.9 (morbid obesity) (CMS/[facility])    Follow Up:Return in about 2 months (around 2019).      DISCUSSION: The above plan was delineated in details with patient and all questions and concerns were answered.  Patient is also given contact information.  Patient is to return as scheduled or sooner if new problems arise.   Subjective                                                     SUBJECTIVE   History of Present Illness  Ms. Roma Caballero is a 59 y.o. female who is here for Consult (Egd) and Heartburn  reports of retrosternal pain. Indigestion worse at night. No wt change. No dysphagia or odynophagia. No NSAIDS  Had EGD many years ago, showing HH and gastritis, per pt.  Was on Ranitidine 300 mg BID for 2-3 yrs ago. Has been on Protonix off and on    Chronic constipation. Takes Metamucil PRN when no BM for 3 days.  Metamucil effective.    Colonoscopy, done on 10/1/18 by Dr Goldstein, showed 2 adenoma polyps and one was over 1 cm and hemorrhoids.     ROS:Review of Systems    Constitutional: Positive for fatigue.        Pt currently or recently takes NSAIDS ( (i.e Ibuprofen, Aleve, Advil, Exedrin, BC Powder, diclofenac, meloxicam, & Naproxen, etc)? No    Pt currently or recently takes abx? No   HENT: Negative.    Eyes: Negative.    Respiratory: Negative.    Cardiovascular: Negative.    Gastrointestinal: Positive for abdominal distention, abdominal pain, constipation and rectal pain.        Chronic acid reflux   Endocrine: Negative.    Genitourinary: Negative.    Musculoskeletal: Negative.    Skin: Negative.    Allergic/Immunologic: Negative.    Neurological: Positive for dizziness.   Hematological: Negative.    Psychiatric/Behavioral: Negative.    Subjective   PAST MED HX: Pt  has a past medical history of Colon polyps, DDD (degenerative disc disease), lumbar, TATYANA (generalized anxiety disorder), GERD (gastroesophageal reflux disease), Heart murmur, Hemorrhoids, HTN (hypertension), Morbid obesity (CMS/HCC), MANUEL (obstructive sleep apnea), Osteoarthritis of both knees, Phantom limb pain (CMS/HCC), Rheumatic fever, Seasonal allergies, and Tobacco dependence.  PAST SURG HX: Pt  has a past surgical history that includes Hysterectomy (2004); Tonsillectomy (1973); Arm amputation (Right, 2006); Upper gastrointestinal endoscopy (2014); Colonoscopy (10/01/2018); and Shoulder arthroscopy (Left, 2011).  FAM HX: family history includes Colon cancer in her maternal aunt; Diabetes in her mother; Hypertension in her mother; Lung cancer in her father.  SOC HX: Pt  reports that she has been smoking cigarettes.  She has a 3.75 pack-year smoking history. She has never used smokeless tobacco. She reports that she does not drink alcohol or use drugs.    Objective                                                           OBJECTIVE   Allergy: Pt is allergic to aspirin.  MEDS:•  atorvastatin (LIPITOR) 80 MG tablet, Take 1 tablet by mouth Daily., Disp: 90 tablet, Rfl: 3  •  Cetirizine HCl (ZYRTEC ALLERGY PO),  Take  by mouth., Disp: , Rfl:   •  fluticasone (FLONASE) 50 MCG/ACT nasal spray, 2 sprays into the nostril(s) as directed by provider Daily., Disp: 3 bottle, Rfl: 1  •  metoprolol tartrate (LOPRESSOR) 25 MG tablet, Take 1 tablet by mouth 2 (Two) Times a Day., Disp: 60 tablet, Rfl: 12  •  pantoprazole (PROTONIX) 40 MG EC tablet, Take 1 tablet by mouth Daily., Disp: 90 tablet, Rfl: 1  •  SUMAtriptan (IMITREX) 100 MG tablet, Take one tablet at onset of headache. May repeat dose one time in 2 hours if headache not relieved. Take one tablet at onset of headache. May repeat dose one time in 2 hours if headache not relieved., Disp: , Rfl:   Pathology  Lab Results   Component Value Date    FINALDX  10/01/2018      1. CECUM POLYP:  Tubular adenoma without high-grade dysplasia.   2. ASCENDING COLON POLYP:  Fragments of tubular adenoma without high-grade dysplasia.    JFJ/pattie       MICRO  10/01/2018     Sections from specimens 1 and 2 appear similar and show a tubular proliferation of crypts lined by cells with enlarged, hyperchromatic, overlapping nuclei.  There is no evidence of in-situ or invasive carcinoma.           Lab Results   Component Value Date    WBC 8.79 09/03/2019    WBC 8.45 08/31/2018    WBC 8.94 07/03/2017    EOSABS 0.43 (H) 09/03/2019    EOSABS 0.38 (H) 08/31/2018    EOSABS 0.38 (H) 07/03/2017    HGB 13.6 09/03/2019    HGB 14.2 08/31/2018    HGB 13.3 07/03/2017    HCT 43.3 09/03/2019    HCT 44.1 (H) 08/31/2018    HCT 42.1 07/03/2017    MCV 96.9 09/03/2019    MCV 94.0 08/31/2018    MCV 96.8 07/03/2017    MCHC 31.4 (L) 09/03/2019    MCHC 32.2 08/31/2018    MCHC 31.6 (L) 07/03/2017     09/03/2019     08/31/2018     07/03/2017     Lab Results   Component Value Date    RDW 13.7 09/03/2019    RDW 13.7 08/31/2018    RDW 13.5 07/03/2017    MCHC 31.4 (L) 09/03/2019    MCHC 32.2 08/31/2018    MCHC 31.6 (L) 07/03/2017     Lab Results   Component Value Date     09/03/2019    K 4.3 09/03/2019      09/03/2019    CO2 29.2 (H) 09/03/2019    BUN 9 09/03/2019    BUN 14 01/18/2019    BUN 11 08/31/2018    CREATININE 0.55 (L) 09/03/2019    CREATININE 0.63 01/18/2019    CREATININE 0.66 08/31/2018    EGFRIFNONA 114 09/03/2019    EGFRIFNONA 98 01/18/2019    EGFRIFNONA 93 08/31/2018    GLUCOSE 95 09/03/2019    CALCIUM 9.5 09/03/2019    ANIONGAP 8.8 09/03/2019     Lab Results   Component Value Date    AST 13 09/03/2019    AST 22 01/18/2019    AST 17 08/31/2018    ALT 12 09/03/2019    ALT 29 01/18/2019    ALT 23 08/31/2018    ALKPHOS 100 09/03/2019    ALKPHOS 110 (H) 01/18/2019    ALKPHOS 84 08/31/2018    BILITOT 0.3 09/03/2019    BILITOT 0.3 01/18/2019    BILITOT 0.5 08/31/2018    ALBUMIN 4.60 09/03/2019    ALBUMIN 4.18 01/18/2019    ALBUMIN 4.84 (H) 08/31/2018     No results found for: GGT, LIPASE  Lab Results   Component Value Date    HGBA1C 6.00 (H) 09/03/2019    HGBA1C 6.30 (H) 08/31/2018    HGBA1C 5.90 (H) 07/03/2017    TSH 2.100 09/03/2019    TSH 2.530 08/31/2018    TSH 1.466 07/03/2017     No results found for: INR  Lab Results   Component Value Date    HEPCVIRUSABY Non-Reactive 07/03/2017    CRP 0.26 09/03/2019    CRP 1.02 (H) 07/03/2017     No results found for: THCURSCR, PCPUR, COCAINEUR, METAMPSCNUR, LABOPIASCN, AMPHETSCREEN, LABBENZSCN, TRICYCLICSCN, LABMETHSCN, BARBITSCNUR, OXYCODONESCN, PROPOXSCN, BUPRENORSCNU  Wt Readings from Last 5 Encounters:   10/02/19 101 kg (221 lb 9.6 oz)   09/03/19 101 kg (223 lb)   01/25/19 98.2 kg (216 lb 6.4 oz)   10/12/18 103 kg (226 lb)   10/02/18 102 kg (225 lb 6.4 oz)   body mass index is 40.53 kg/m².,Temp: 96.7 °F (35.9 °C),BP: 100/78,Heart Rate: 84   Physical Exam  General Well developed; well nourished. NAD  ENT Anicteric sclerae. Oral mucosa pink and moist without thrush or lesions    GI Abd soft, NT, ND, normal active bowel sounds.  No HSM.  No abd hernia    Pt care team: Allyn LUJAN & BRENDEN Kang  10/02/19 2:49 PM  [facility]  Medical Group--Gastroenterology  [phone]    CC: Tristan Escobar MD   [address] / [facility] [address] FAX:[phone]

## 2019-10-11 PROBLEM — K21.9 CHRONIC GERD: Status: ACTIVE | Noted: 2019-10-11

## 2019-10-11 PROBLEM — Z79.899 ENCOUNTER FOR MONITORING LONG-TERM PROTON PUMP INHIBITOR THERAPY: Status: ACTIVE | Noted: 2019-10-11

## 2019-10-11 PROBLEM — Z51.81 ENCOUNTER FOR MONITORING LONG-TERM PROTON PUMP INHIBITOR THERAPY: Status: ACTIVE | Noted: 2019-10-11

## 2019-11-08 ENCOUNTER — ANESTHESIA (OUTPATIENT)
Dept: GASTROENTEROLOGY | Facility: HOSPITAL | Age: 59
End: 2019-11-08

## 2019-11-08 ENCOUNTER — HOSPITAL ENCOUNTER (OUTPATIENT)
Facility: HOSPITAL | Age: 59
Setting detail: HOSPITAL OUTPATIENT SURGERY
Discharge: HOME | End: 2019-11-08
Attending: INTERNAL MEDICINE | Admitting: INTERNAL MEDICINE

## 2019-11-08 ENCOUNTER — ANESTHESIA EVENT (OUTPATIENT)
Dept: GASTROENTEROLOGY | Facility: HOSPITAL | Age: 59
End: 2019-11-08

## 2019-11-08 VITALS
OXYGEN SATURATION: 97 % | TEMPERATURE: 97 F | RESPIRATION RATE: 18 BRPM | BODY MASS INDEX: 40.78 KG/M2 | DIASTOLIC BLOOD PRESSURE: 71 MMHG | WEIGHT: 221.6 LBS | HEIGHT: 62 IN | SYSTOLIC BLOOD PRESSURE: 102 MMHG | HEART RATE: 73 BPM

## 2019-11-08 DIAGNOSIS — A04.8 HELICOBACTER PYLORI (H. PYLORI) INFECTION: ICD-10-CM

## 2019-11-08 DIAGNOSIS — K20.0 EOSINOPHILIC ESOPHAGITIS: ICD-10-CM

## 2019-11-08 PROCEDURE — S0260 H&P FOR SURGERY: HCPCS | Performed by: INTERNAL MEDICINE

## 2019-11-08 PROCEDURE — 25010000002 PROPOFOL 10 MG/ML EMULSION: Performed by: NURSE ANESTHETIST, CERTIFIED REGISTERED

## 2019-11-08 PROCEDURE — 88305 TISSUE EXAM BY PATHOLOGIST: CPT | Performed by: INTERNAL MEDICINE

## 2019-11-08 RX ORDER — PROMETHAZINE HYDROCHLORIDE 25 MG/ML
6.25 INJECTION, SOLUTION INTRAMUSCULAR; INTRAVENOUS ONCE AS NEEDED
Status: DISCONTINUED | OUTPATIENT
Start: 2019-11-08 | End: 2019-11-08 | Stop reason: HOSPADM

## 2019-11-08 RX ORDER — SODIUM CHLORIDE, SODIUM LACTATE, POTASSIUM CHLORIDE, CALCIUM CHLORIDE 600; 310; 30; 20 MG/100ML; MG/100ML; MG/100ML; MG/100ML
9 INJECTION, SOLUTION INTRAVENOUS CONTINUOUS
Status: DISCONTINUED | OUTPATIENT
Start: 2019-11-08 | End: 2019-11-08 | Stop reason: HOSPADM

## 2019-11-08 RX ORDER — ACETAMINOPHEN 325 MG/1
650 TABLET ORAL ONCE AS NEEDED
Status: DISCONTINUED | OUTPATIENT
Start: 2019-11-08 | End: 2019-11-08 | Stop reason: HOSPADM

## 2019-11-08 RX ORDER — ACETAMINOPHEN 650 MG/1
650 SUPPOSITORY RECTAL ONCE AS NEEDED
Status: DISCONTINUED | OUTPATIENT
Start: 2019-11-08 | End: 2019-11-08 | Stop reason: HOSPADM

## 2019-11-08 RX ORDER — ONDANSETRON 2 MG/ML
4 INJECTION INTRAMUSCULAR; INTRAVENOUS ONCE AS NEEDED
Status: DISCONTINUED | OUTPATIENT
Start: 2019-11-08 | End: 2019-11-08 | Stop reason: HOSPADM

## 2019-11-08 RX ORDER — PROMETHAZINE HYDROCHLORIDE 25 MG/1
25 TABLET ORAL ONCE AS NEEDED
Status: DISCONTINUED | OUTPATIENT
Start: 2019-11-08 | End: 2019-11-08 | Stop reason: HOSPADM

## 2019-11-08 RX ORDER — LIDOCAINE HYDROCHLORIDE 10 MG/ML
INJECTION, SOLUTION EPIDURAL; INFILTRATION; INTRACAUDAL; PERINEURAL AS NEEDED
Status: DISCONTINUED | OUTPATIENT
Start: 2019-11-08 | End: 2019-11-08 | Stop reason: SURG

## 2019-11-08 RX ORDER — SODIUM CHLORIDE 0.9 % (FLUSH) 0.9 %
3-10 SYRINGE (ML) INJECTION AS NEEDED
Status: DISCONTINUED | OUTPATIENT
Start: 2019-11-08 | End: 2019-11-08 | Stop reason: HOSPADM

## 2019-11-08 RX ORDER — LIDOCAINE HYDROCHLORIDE 10 MG/ML
0.5 INJECTION, SOLUTION EPIDURAL; INFILTRATION; INTRACAUDAL; PERINEURAL ONCE AS NEEDED
Status: DISCONTINUED | OUTPATIENT
Start: 2019-11-08 | End: 2019-11-08 | Stop reason: HOSPADM

## 2019-11-08 RX ORDER — IPRATROPIUM BROMIDE AND ALBUTEROL SULFATE 2.5; .5 MG/3ML; MG/3ML
3 SOLUTION RESPIRATORY (INHALATION) ONCE AS NEEDED
Status: DISCONTINUED | OUTPATIENT
Start: 2019-11-08 | End: 2019-11-08 | Stop reason: HOSPADM

## 2019-11-08 RX ORDER — SODIUM CHLORIDE 0.9 % (FLUSH) 0.9 %
3 SYRINGE (ML) INJECTION EVERY 12 HOURS SCHEDULED
Status: DISCONTINUED | OUTPATIENT
Start: 2019-11-08 | End: 2019-11-08 | Stop reason: HOSPADM

## 2019-11-08 RX ORDER — FAMOTIDINE 10 MG/ML
20 INJECTION, SOLUTION INTRAVENOUS ONCE
Status: COMPLETED | OUTPATIENT
Start: 2019-11-08 | End: 2019-11-08

## 2019-11-08 RX ORDER — FAMOTIDINE 20 MG/1
20 TABLET, FILM COATED ORAL ONCE
Status: DISCONTINUED | OUTPATIENT
Start: 2019-11-08 | End: 2019-11-08 | Stop reason: HOSPADM

## 2019-11-08 RX ORDER — PROMETHAZINE HYDROCHLORIDE 25 MG/1
25 SUPPOSITORY RECTAL ONCE AS NEEDED
Status: DISCONTINUED | OUTPATIENT
Start: 2019-11-08 | End: 2019-11-08 | Stop reason: HOSPADM

## 2019-11-08 RX ORDER — FENTANYL CITRATE 50 UG/ML
50 INJECTION, SOLUTION INTRAMUSCULAR; INTRAVENOUS
Status: DISCONTINUED | OUTPATIENT
Start: 2019-11-08 | End: 2019-11-08 | Stop reason: HOSPADM

## 2019-11-08 RX ORDER — PROPOFOL 10 MG/ML
VIAL (ML) INTRAVENOUS AS NEEDED
Status: DISCONTINUED | OUTPATIENT
Start: 2019-11-08 | End: 2019-11-08 | Stop reason: SURG

## 2019-11-08 RX ADMIN — PROPOFOL 30 MG: 10 INJECTION, EMULSION INTRAVENOUS at 08:28

## 2019-11-08 RX ADMIN — SODIUM CHLORIDE, POTASSIUM CHLORIDE, SODIUM LACTATE AND CALCIUM CHLORIDE 9 ML/HR: 600; 310; 30; 20 INJECTION, SOLUTION INTRAVENOUS at 08:13

## 2019-11-08 RX ADMIN — PROPOFOL 130 MG: 10 INJECTION, EMULSION INTRAVENOUS at 08:21

## 2019-11-08 RX ADMIN — LIDOCAINE HYDROCHLORIDE 50 MG: 10 INJECTION, SOLUTION EPIDURAL; INFILTRATION; INTRACAUDAL; PERINEURAL at 08:21

## 2019-11-08 RX ADMIN — PROPOFOL 30 MG: 10 INJECTION, EMULSION INTRAVENOUS at 08:27

## 2019-11-08 RX ADMIN — FAMOTIDINE 20 MG: 10 INJECTION, SOLUTION INTRAVENOUS at 08:12

## 2019-11-08 NOTE — ANESTHESIA PREPROCEDURE EVALUATION
Anesthesia Evaluation     Patient summary reviewed and Nursing notes reviewed   no history of anesthetic complications:  NPO Solid Status: > 8 hours  NPO Liquid Status: > 8 hours           Airway   Mallampati: II  TM distance: >3 FB  Neck ROM: full  No difficulty expected  Dental      Pulmonary - normal exam   (+) sleep apnea,   Cardiovascular - normal exam    (+) hypertension, valvular problems/murmurs, hyperlipidemia,       Neuro/Psych  (+) headaches, numbness, psychiatric history,     GI/Hepatic/Renal/Endo    (+) morbid obesity, GERD,      Musculoskeletal     Abdominal    Substance History      OB/GYN          Other   arthritis,                      Anesthesia Plan    ASA 2     general     intravenous induction     Anesthetic plan, all risks, benefits, and alternatives have been provided, discussed and informed consent has been obtained with: patient.    Plan discussed with CRNA.

## 2019-11-08 NOTE — H&P
HPI: Ms. Grider is a 60 yo with a history of GERD, hypertension and morbid obesity. She has experienced some breakthrough heartburn. There is no difficult or painful swallowing. She denies weight loss. There is no issue with abdominal pain. The patient was started on Protonix with some relief. Her last endoscopy was years ago.     PMH: Hypertension            Morbid obesity            MANUEL            GERD    PSH: Right arm amputation           Hysterectomy    All: Aspirin  Meds: see list    Fam Hx: Maternal aunt with colon cancer    Soc Hx: Tobacco for over 30 years, no alcohol    ROS: see HPI  Phy Exam: Gen- Pleasant female, no acute distress                     Heent- oropharynx clear, neck supple                     Chest- clear to auscultation                     Cardiac- s1s2, no gallop, murmur over precordium                      Abd- soft, bowel sounds present, no guarding                     Ext- right arm amputation, no edema                     Neuro- awake, alert, no asterixis                     Skin- no spider nevi  Imp: GERD  Plan: Will proceed with EGD.           Will need lifestyle modifications.

## 2019-11-11 NOTE — ANESTHESIA POSTPROCEDURE EVALUATION
Patient: Dee Nairez    Procedure Summary     Date:  11/08/19 Room / Location:   [facility]    Anesthesia Start:  0819 Anesthesia Stop:  0839    Procedure:  esophagogastroduodenoscopy with biopsies (N/A ) Diagnosis:       Chronic GERD      Encounter for monitoring long-term proton pump inhibitor therapy      (Chronic GERD [K21.9])      (Encounter for monitoring long-term proton pump inhibitor therapy [Z51.81, Z79.899])    Surgeon:  Toño Goldstein MD Provider:  Lino Ford MD    Anesthesia Type:  general ASA Status:  2          Anesthesia Type: general  Last vitals  BP   102/71 (11/08/19 0900)   Temp   97 °F (36.1 °C) (11/08/19 0836)   Pulse   73 (11/08/19 0915)   Resp   18 (11/08/19 0915)     SpO2   97 % (11/08/19 0915)     Anesthesia Post Evaluation

## 2019-11-13 ENCOUNTER — OFFICE VISIT (OUTPATIENT)
Dept: FAMILY MEDICINE CLINIC | Facility: CLINIC | Age: 59
End: 2019-11-13

## 2019-11-13 VITALS
SYSTOLIC BLOOD PRESSURE: 118 MMHG | BODY MASS INDEX: 41.11 KG/M2 | OXYGEN SATURATION: 95 % | RESPIRATION RATE: 16 BRPM | TEMPERATURE: 97.7 F | HEIGHT: 62 IN | DIASTOLIC BLOOD PRESSURE: 60 MMHG | WEIGHT: 223.4 LBS | HEART RATE: 78 BPM

## 2019-11-13 DIAGNOSIS — M54.6 ACUTE LEFT-SIDED THORACIC BACK PAIN: Primary | ICD-10-CM

## 2019-11-13 PROCEDURE — 99213 OFFICE O/P EST LOW 20 MIN: CPT | Performed by: FAMILY MEDICINE

## 2019-11-13 PROCEDURE — 96372 THER/PROPH/DIAG INJ SC/IM: CPT | Performed by: FAMILY MEDICINE

## 2019-11-13 RX ORDER — CYCLOBENZAPRINE HCL 10 MG
10 TABLET ORAL 3 TIMES DAILY PRN
Qty: 90 TABLET | Refills: 0 | Status: SHIPPED | OUTPATIENT
Start: 2019-11-13 | End: 2020-02-21

## 2019-11-13 RX ORDER — RIZATRIPTAN BENZOATE 10 MG/1
TABLET ORAL
Refills: 5 | COMMUNITY
Start: 2019-10-28 | End: 2021-10-06

## 2019-11-13 RX ORDER — KETOROLAC TROMETHAMINE 30 MG/ML
60 INJECTION, SOLUTION INTRAMUSCULAR; INTRAVENOUS ONCE
Status: COMPLETED | OUTPATIENT
Start: 2019-11-13 | End: 2019-11-13

## 2019-11-13 RX ORDER — CYCLOBENZAPRINE HCL 10 MG
TABLET ORAL
COMMUNITY
Start: 2019-11-13 | End: 2019-11-13 | Stop reason: SDUPTHER

## 2019-11-13 RX ADMIN — KETOROLAC TROMETHAMINE 60 MG: 30 INJECTION, SOLUTION INTRAMUSCULAR; INTRAVENOUS at 14:05

## 2019-11-13 NOTE — PROGRESS NOTES
Dee Grider is a 59 y.o. female who presents today for Back Pain (started yesterday)      Patient states she has chronic pain in back and limbs. She began having worsening upper back pain last ngith and then this morning become unbearable. Pain has remained constant. No changes in activity or recent trauma.       Back Pain   This is a recurrent (has chronic back problems with acute exacerbation) problem. The current episode started yesterday. The problem occurs constantly. The problem is unchanged. The pain is present in the thoracic spine. The quality of the pain is described as stabbing. Radiates to: radiates to left shoulder and left are and sometimes to chest. The pain is at a severity of 10/10. The pain is severe. The pain is the same all the time. The symptoms are aggravated by bending, twisting and coughing (any movement worsens pain). Stiffness is present all day. Associated symptoms include numbness (chronic and at baseline) and tingling (chronic and at baseline). Pertinent negatives include no abdominal pain, bladder incontinence, bowel incontinence, chest pain, dysuria, fever, headaches, paresis, paresthesias, pelvic pain, perianal numbness, weakness or weight loss. Risk factors include menopause, obesity, recent trauma and lack of exercise. She has tried nothing for the symptoms. The treatment provided no relief.        Review of Systems   Constitutional: Negative for fever and unexpected weight loss.   Cardiovascular: Negative for chest pain.   Gastrointestinal: Negative for abdominal pain and bowel incontinence.   Genitourinary: Negative for dysuria, pelvic pain and urinary incontinence.   Musculoskeletal: Positive for back pain.   Neurological: Positive for tingling (chronic and at baseline) and numbness (chronic and at baseline). Negative for weakness and paresthesias.        The following portions of the patient's history were reviewed and updated as appropriate: allergies, current medications,  "past family history, past medical history, past social history, past surgical history and problem list.    Current Outpatient Medications on File Prior to Visit   Medication Sig Dispense Refill   • atorvastatin (LIPITOR) 80 MG tablet Take 1 tablet by mouth Daily. 90 tablet 3   • Cetirizine HCl (ZYRTEC ALLERGY PO) Take  by mouth.     • fluticasone (FLONASE) 50 MCG/ACT nasal spray 2 sprays into the nostril(s) as directed by provider Daily. 3 bottle 1   • metoprolol tartrate (LOPRESSOR) 25 MG tablet Take 1 tablet by mouth 2 (Two) Times a Day. 60 tablet 12   • pantoprazole (PROTONIX) 40 MG EC tablet Take 1 tablet by mouth Daily. 90 tablet 1   • rizatriptan (MAXALT) 10 MG tablet PLEASE SEE ATTACHED FOR DETAILED DIRECTIONS  5     No current facility-administered medications on file prior to visit.        Allergies   Allergen Reactions   • Aspirin Nausea And Vomiting        Visit Vitals  /60   Pulse 78   Temp 97.7 °F (36.5 °C)   Resp 16   Ht 157.5 cm (62\")   Wt 101 kg (223 lb 6.4 oz)   SpO2 95%   BMI 40.86 kg/m²        Physical Exam   Constitutional: She is oriented to person, place, and time. She appears well-developed and well-nourished. No distress.   HENT:   Head: Atraumatic.   Eyes: EOM are normal.   Neck: Normal range of motion. Neck supple.   Cardiovascular: Normal rate, regular rhythm, normal heart sounds and intact distal pulses. Exam reveals no gallop and no friction rub.   No murmur heard.  Pulmonary/Chest: Effort normal and breath sounds normal. No stridor. No respiratory distress. She has no wheezes. She has no rales.   Musculoskeletal: She exhibits no edema.        Thoracic back: She exhibits decreased range of motion, tenderness and spasm. She exhibits no bony tenderness, no swelling, no edema, no deformity, no laceration and no pain.   Neurological: She is alert and oriented to person, place, and time.   Skin: Skin is warm and dry. She is not diaphoretic.   Psychiatric: She has a normal mood and " affect. Her behavior is normal.             Problems Addressed this Visit        Nervous and Auditory    Acute left-sided thoracic back pain - Primary     Patient was given ketorolac injection and had significant improvement in pain.  She is given prescription for Flexeril to use for muscle spasm.  She will continue to use Tylenol as needed for pain at home.  Patient was encouraged to think about restarting physical therapy as well.  RTC precautions given.         Relevant Medications    ketorolac (TORADOL) injection 60 mg (Completed)          Return in about 3 months (around 2/13/2020) for Follow-up back pain and GERD.    Parts of this office note have been dictated by voice recognition software. Grammatical and/or spelling errors may be present.    Sebastian Pal MD   11/18/2019

## 2019-11-15 ENCOUNTER — TELEPHONE (OUTPATIENT)
Dept: GASTROENTEROLOGY | Facility: CLINIC | Age: 59
End: 2019-11-15

## 2019-11-15 NOTE — TELEPHONE ENCOUNTER
----- Message from Toño Goldstein MD sent at 11/11/2019  3:39 PM EST -----  Allyn:  There was evidence for intestinal metaplasia in the antral biopsies.  There was also evidence for reflux.  Strongly suggest the patient to stop tobacco use.  Will also need to work on lifestyle modification including gradual weight loss.  She should continue PPI medication at this time.

## 2019-11-15 NOTE — TELEPHONE ENCOUNTER
pls let pt know that EGD show reflux esophagitis/gastritis.     HOLD Protonix, starting today.  Keep f/u appt on Dec 3.  I want to test her for H-pylori

## 2019-11-18 NOTE — TELEPHONE ENCOUNTER
I CALLED PATIENT AND GAVE BIOPSY RESULTS. PATIENT AGREED TO STOP PROTONIX UNTIL AFTER APPOINTMENT ON December 3.

## 2019-11-18 NOTE — ASSESSMENT & PLAN NOTE
Patient was given ketorolac injection and had significant improvement in pain.  She is given prescription for Flexeril to use for muscle spasm.  She will continue to use Tylenol as needed for pain at home.  Patient was encouraged to think about restarting physical therapy as well.  RTC precautions given.

## 2019-12-03 ENCOUNTER — OFFICE VISIT (OUTPATIENT)
Dept: GASTROENTEROLOGY | Facility: CLINIC | Age: 59
End: 2019-12-03

## 2019-12-03 ENCOUNTER — LAB (OUTPATIENT)
Dept: LAB | Facility: HOSPITAL | Age: 59
End: 2019-12-03

## 2019-12-03 VITALS
DIASTOLIC BLOOD PRESSURE: 86 MMHG | HEART RATE: 108 BPM | HEIGHT: 62 IN | WEIGHT: 220.8 LBS | BODY MASS INDEX: 40.63 KG/M2 | TEMPERATURE: 97.4 F | SYSTOLIC BLOOD PRESSURE: 128 MMHG | OXYGEN SATURATION: 98 %

## 2019-12-03 DIAGNOSIS — K64.4 EXTERNAL HEMORRHOID: ICD-10-CM

## 2019-12-03 DIAGNOSIS — K62.5 RECTAL BLEEDING: Primary | ICD-10-CM

## 2019-12-03 DIAGNOSIS — K62.5 RECTAL BLEEDING: ICD-10-CM

## 2019-12-03 DIAGNOSIS — K31.A0 INTESTINAL METAPLASIA OF GASTRIC CARDIA: ICD-10-CM

## 2019-12-03 DIAGNOSIS — K59.09 CHRONIC CONSTIPATION: ICD-10-CM

## 2019-12-03 LAB
BASOPHILS # BLD AUTO: 0.08 10*3/MM3 (ref 0–0.2)
BASOPHILS NFR BLD AUTO: 0.9 % (ref 0–1.5)
DEPRECATED RDW RBC AUTO: 42.3 FL (ref 37–54)
EOSINOPHIL # BLD AUTO: 0.52 10*3/MM3 (ref 0–0.4)
EOSINOPHIL NFR BLD AUTO: 5.6 % (ref 0.3–6.2)
ERYTHROCYTE [DISTWIDTH] IN BLOOD BY AUTOMATED COUNT: 12.8 % (ref 12.3–15.4)
FERRITIN SERPL-MCNC: 136 NG/ML (ref 13–150)
HCT VFR BLD AUTO: 42.9 % (ref 34–46.6)
HGB BLD-MCNC: 14 G/DL (ref 12–15.9)
IMM GRANULOCYTES # BLD AUTO: 0.04 10*3/MM3 (ref 0–0.05)
IMM GRANULOCYTES NFR BLD AUTO: 0.4 % (ref 0–0.5)
IRON 24H UR-MRATE: 97 MCG/DL (ref 37–145)
IRON SATN MFR SERPL: 22 % (ref 20–50)
LYMPHOCYTES # BLD AUTO: 3.48 10*3/MM3 (ref 0.7–3.1)
LYMPHOCYTES NFR BLD AUTO: 37.3 % (ref 19.6–45.3)
MCH RBC QN AUTO: 29.6 PG (ref 26.6–33)
MCHC RBC AUTO-ENTMCNC: 32.6 G/DL (ref 31.5–35.7)
MCV RBC AUTO: 90.7 FL (ref 79–97)
MONOCYTES # BLD AUTO: 0.36 10*3/MM3 (ref 0.1–0.9)
MONOCYTES NFR BLD AUTO: 3.9 % (ref 5–12)
NEUTROPHILS # BLD AUTO: 4.86 10*3/MM3 (ref 1.7–7)
NEUTROPHILS NFR BLD AUTO: 51.9 % (ref 42.7–76)
NRBC BLD AUTO-RTO: 0 /100 WBC (ref 0–0.2)
PLATELET # BLD AUTO: 296 10*3/MM3 (ref 140–450)
PMV BLD AUTO: 10.6 FL (ref 6–12)
RBC # BLD AUTO: 4.73 10*6/MM3 (ref 3.77–5.28)
RETICS # AUTO: 0.08 10*6/MM3 (ref 0.02–0.13)
RETICS/RBC NFR AUTO: 1.72 % (ref 0.7–1.9)
TIBC SERPL-MCNC: 441 MCG/DL (ref 298–536)
TRANSFERRIN SERPL-MCNC: 296 MG/DL (ref 200–360)
WBC NRBC COR # BLD: 9.34 10*3/MM3 (ref 3.4–10.8)

## 2019-12-03 PROCEDURE — 36415 COLL VENOUS BLD VENIPUNCTURE: CPT

## 2019-12-03 PROCEDURE — 83540 ASSAY OF IRON: CPT

## 2019-12-03 PROCEDURE — 85025 COMPLETE CBC W/AUTO DIFF WBC: CPT

## 2019-12-03 PROCEDURE — 84466 ASSAY OF TRANSFERRIN: CPT

## 2019-12-03 PROCEDURE — 85045 AUTOMATED RETICULOCYTE COUNT: CPT

## 2019-12-03 PROCEDURE — 99214 OFFICE O/P EST MOD 30 MIN: CPT | Performed by: NURSE PRACTITIONER

## 2019-12-03 PROCEDURE — 83013 H PYLORI (C-13) BREATH: CPT

## 2019-12-03 PROCEDURE — 82728 ASSAY OF FERRITIN: CPT

## 2019-12-03 RX ORDER — HYDROCORTISONE ACETATE 25 MG/1
25 SUPPOSITORY RECTAL SEE ADMIN INSTRUCTIONS
Qty: 30 SUPPOSITORY | Refills: 0 | Status: SHIPPED | OUTPATIENT
Start: 2019-12-03 | End: 2020-01-31

## 2019-12-03 NOTE — PROGRESS NOTES
GASTROENTEROLOGY OUTPATIENT ESTABLISHED PATIENT NOTE  Patient: ROMA CABALLERO : 1960  Date of Service: 2019  CC: Follow-up    Assessment/Plan                                             ASSESSMENT & PLANS     Chronic constipation  · Increase Metamucil daily    External hemorrhoid  Rectal bleeding  -     CBC Auto Differential; Future  -     Ferritin; Future  -     Iron Profile; Future  -     Reticulocytes; Future  -     Start hydrocortisone (ANUSOL-HC) 25 MG suppository; Insert 1 suppository into the rectum See Admin Instructions. Per rectum 2x a day x 1 wk then 2x a day as needed for hemorrhoid irritation    Intestinal metaplasia of gastric cardia per EGD  -     H. Pylori Breath Test - Breath, Lung; Future  · Progression to gastric cancer in patients with intestinal metaplasia is low.  In average risk patients (incidental dx), the decision to perform endoscopic surveillance should be individualized (eg, based on patient age, comorbidities, preference, and other risk factors for gastric cancer).       · I think a repeat survaillance EGD in 3 to 5 years is reasonable. There is yet a standardized recommendation for repeat surveillance for gastric metaplasia, and it is still controversial.    Follow Up:Return in about 3 months (around 3/3/2020).      DISCUSSION: The above plan was delineated in details with patient and all questions and concerns were answered.  Patient is also given contact information.  Patient is to return as scheduled or sooner if new problems arise.   Subjective                                                     SUBJECTIVE   History of Present Illness  Ms. Roma Caballero is a 59 y.o. female who is here for Follow-up  More hb d/t not being on protonix for 2 wks. Still smokes. About 0.3 pack a day or so.    Chronic constipation. BM once every 2-3 days. Hooker 5 or 6. Miralax not as effective, compared to Metamucil. Takes Metamucil only PRN after not having a BM for 3 days.    Has  rectal bleeding.  Previously intermittently.  Recently daily rectal x 1 week.  For a wk, every time she goes to the bathroom for urinate or having a BM, She passses blood in the toiltet water. Denies straining or having hard lumpy stools when she has rectal bleeding. Denies urinary or vaginal source of bleeding       EGD, done on 11/8/19 by Dr Goldstein, showed small hiatal hernia, LA Grade  A esophagitis, mild gastritis, and normal duodenum.   Colonoscopy, done on 10/1/18 by Dr Goldstein, showed 2 adenoma polyps and one was over 1 cm and hemorrhoids.     ROS:Review of Systems   Constitutional: Negative.         Pt currently or recently takes NSAIDS ( (i.e Ibuprofen, Aleve, Advil, Exedrin, BC Powder, diclofenac, meloxicam, & Naproxen, etc)? No    Pt currently or recently takes abx? No   HENT: Negative.    Eyes: Negative.    Respiratory: Negative.    Cardiovascular: Negative.    Gastrointestinal: Positive for anal bleeding and constipation.        Heartburn   Endocrine: Negative.    Genitourinary: Negative.    Musculoskeletal: Negative.    Skin: Negative.    Allergic/Immunologic: Negative.    Neurological: Negative.    Hematological: Negative.    Psychiatric/Behavioral: Negative.      Objective                                                           OBJECTIVE   Allergy: Pt is allergic to aspirin.  MEDS: •  atorvastatin (LIPITOR) 80 MG tablet, Take 1 tablet by mouth Daily., Disp: 90 tablet, Rfl: 3  •  Cetirizine HCl (ZYRTEC ALLERGY PO), Take  by mouth., Disp: , Rfl:   •  cyclobenzaprine (FLEXERIL) 10 MG tablet, Take 1 tablet by mouth 3 (Three) Times a Day As Needed for Muscle Spasms., Disp: 90 tablet, Rfl: 0  •  fluticasone (FLONASE) 50 MCG/ACT nasal spray, 2 sprays into the nostril(s) as directed by provider Daily., Disp: 3 bottle, Rfl: 1  •  metoprolol tartrate (LOPRESSOR) 25 MG tablet, Take 1 tablet by mouth 2 (Two) Times a Day., Disp: 60 tablet, Rfl: 12  •  pantoprazole (PROTONIX) 40 MG EC tablet, Take 1 tablet by  mouth Daily., Disp: 90 tablet, Rfl: 1  •  rizatriptan (MAXALT) 10 MG tablet, PLEASE SEE ATTACHED FOR DETAILED DIRECTIONS, Disp: , Rfl: 5    Lab Results   Component Value Date    WBC 8.79 09/03/2019    WBC 8.45 08/31/2018    WBC 8.94 07/03/2017    EOSABS 0.43 (H) 09/03/2019    EOSABS 0.38 (H) 08/31/2018    EOSABS 0.38 (H) 07/03/2017    HGB 13.6 09/03/2019    HGB 14.2 08/31/2018    HGB 13.3 07/03/2017    HCT 43.3 09/03/2019    HCT 44.1 (H) 08/31/2018    HCT 42.1 07/03/2017    MCV 96.9 09/03/2019    MCV 94.0 08/31/2018    MCV 96.8 07/03/2017    MCHC 31.4 (L) 09/03/2019    MCHC 32.2 08/31/2018    MCHC 31.6 (L) 07/03/2017     09/03/2019     08/31/2018     07/03/2017     Lab Results   Component Value Date    RDW 13.7 09/03/2019    RDW 13.7 08/31/2018    RDW 13.5 07/03/2017    MCHC 31.4 (L) 09/03/2019    MCHC 32.2 08/31/2018    MCHC 31.6 (L) 07/03/2017     Lab Results   Component Value Date     09/03/2019    K 4.3 09/03/2019     09/03/2019    CO2 29.2 (H) 09/03/2019    BUN 9 09/03/2019    BUN 14 01/18/2019    BUN 11 08/31/2018    CREATININE 0.55 (L) 09/03/2019    CREATININE 0.63 01/18/2019    CREATININE 0.66 08/31/2018    EGFRIFNONA 114 09/03/2019    EGFRIFNONA 98 01/18/2019    EGFRIFNONA 93 08/31/2018    GLUCOSE 95 09/03/2019    CALCIUM 9.5 09/03/2019    ANIONGAP 8.8 09/03/2019     Lab Results   Component Value Date    AST 13 09/03/2019    AST 22 01/18/2019    AST 17 08/31/2018    ALT 12 09/03/2019    ALT 29 01/18/2019    ALT 23 08/31/2018    ALKPHOS 100 09/03/2019    ALKPHOS 110 (H) 01/18/2019    ALKPHOS 84 08/31/2018    BILITOT 0.3 09/03/2019    BILITOT 0.3 01/18/2019    BILITOT 0.5 08/31/2018    ALBUMIN 4.60 09/03/2019    ALBUMIN 4.18 01/18/2019    ALBUMIN 4.84 (H) 08/31/2018     Lab Results   Component Value Date    HGBA1C 6.00 (H) 09/03/2019    HGBA1C 6.30 (H) 08/31/2018    HGBA1C 5.90 (H) 07/03/2017    TSH 2.100 09/03/2019    TSH 2.530 08/31/2018    TSH 1.466 07/03/2017     Lab  Results   Component Value Date    HEPCVIRUSABY Non-Reactive 07/03/2017    CRP 0.26 09/03/2019    CRP 1.02 (H) 07/03/2017   Pathology  Lab Results   Component Value Date    FINALDX  11/08/2019     1. GASTRIC ANTRUM BIOPSY:  Minimal chronic gastritis.  Intestinal metaplasia.  Negative for H. pylori, dysplasia, or malignancy.   2. GASTRIC BODY BIOPSY:  No pathologic alterations.   Negative for H. pylori, metaplasia, dysplasia, or malignancy.   3. DISTAL ESOPHAGUS BIOPSY:  Features consistent with reflux esophagitis (no eosinophils identified).  Negative for metaplasia, dysplasia, or malignancy.   4. MIDESOPHAGUS BIOPSY:   Features consistent with reflux esophagitis (no eosinophils identified).  Negative for metaplasia, dysplasia, or malignancy.     RLL/mbc       FINALDX  10/01/2018      1. CECUM POLYP:  Tubular adenoma without high-grade dysplasia.   2. ASCENDING COLON POLYP:  Fragments of tubular adenoma without high-grade dysplasia.    JFJ/pattie       MICRO  11/08/2019     The slides are reviewed and demonstrate histopathologic features supporting the above rendered diagnosis.         MICRO  10/01/2018     Sections from specimens 1 and 2 appear similar and show a tubular proliferation of crypts lined by cells with enlarged, hyperchromatic, overlapping nuclei.  There is no evidence of in-situ or invasive carcinoma.             Wt Readings from Last 5 Encounters:   12/03/19 100 kg (220 lb 12.8 oz)   11/13/19 101 kg (223 lb 6.4 oz)   11/08/19 101 kg (221 lb 9.6 oz)   10/02/19 101 kg (221 lb 9.6 oz)   09/03/19 101 kg (223 lb)   body mass index is 40.38 kg/m².,Temp: 97.4 °F (36.3 °C),BP: 128/86,Heart Rate: 108   Physical Exam  General Well developed; well nourished. NAD  ENT Anicteric sclerae. Oral mucosa pink and moist without thrush or lesions    GI Abd soft, NT, ND, normal active bowel sounds.  No HSM.  No abd hernia  Rectun  Small external hemorrhoid. No blood seen    Pt care team: Allyn LUJAN & Edmund  BRENDEN Lozano  12/03/19 11:01 AM  [facility]--Gastroenterology  [phone]    CC: , Sebastian HARTLEY MD   [address] [place] [address] [address] / [place] [address] FAX:[phone]

## 2019-12-05 LAB — UREA BREATH TEST QL: NEGATIVE

## 2020-01-22 RX ORDER — ATORVASTATIN CALCIUM 80 MG/1
TABLET, FILM COATED ORAL
Qty: 90 TABLET | Refills: 3 | Status: SHIPPED | OUTPATIENT
Start: 2020-01-22 | End: 2021-04-26

## 2020-01-31 ENCOUNTER — OFFICE VISIT (OUTPATIENT)
Dept: CARDIOLOGY | Facility: CLINIC | Age: 60
End: 2020-01-31

## 2020-01-31 VITALS
BODY MASS INDEX: 41.41 KG/M2 | OXYGEN SATURATION: 94 % | SYSTOLIC BLOOD PRESSURE: 106 MMHG | HEART RATE: 71 BPM | DIASTOLIC BLOOD PRESSURE: 64 MMHG | WEIGHT: 225 LBS | HEIGHT: 62 IN

## 2020-01-31 DIAGNOSIS — E78.01 FAMILIAL HYPERCHOLESTEROLEMIA: ICD-10-CM

## 2020-01-31 DIAGNOSIS — F32.A DEPRESSION, UNSPECIFIED DEPRESSION TYPE: ICD-10-CM

## 2020-01-31 DIAGNOSIS — I51.89 DIASTOLIC DYSFUNCTION: ICD-10-CM

## 2020-01-31 DIAGNOSIS — R01.1 MURMUR, HEART: ICD-10-CM

## 2020-01-31 DIAGNOSIS — I10 ESSENTIAL HYPERTENSION: Primary | ICD-10-CM

## 2020-01-31 PROCEDURE — 99214 OFFICE O/P EST MOD 30 MIN: CPT | Performed by: INTERNAL MEDICINE

## 2020-01-31 RX ORDER — ESCITALOPRAM OXALATE 10 MG/1
10 TABLET ORAL DAILY
Qty: 90 TABLET | Refills: 3 | Status: SHIPPED | OUTPATIENT
Start: 2020-01-31 | End: 2021-02-05

## 2020-01-31 NOTE — PROGRESS NOTES
[facility] Cardiology    Encounter Date:2020        Patient ID: Dee Grider is a 59 y.o. female.  : 1960     PCP: Sebastian Pal MD     Chief Complaint: Heart Murmur      PROBLEM LIST:  1. Cardiac murmur:  a. Echocardiogram, 2018: EF 60%. LV diastolic dysfunction (grade I). Mild AI.   2. Hypertension.  3. Familial hypercholesterolemia.  4. Morbid obesity.  5. Tobacco abuse.  6. MANUEL.  7. Pre-diabetes.  8. Seasonal allergies.  9. GERD.    History of Present Illness  Patient presents today for annual follow-up with a history of heart murmur and cardiac risk factors. Since last visit, she has been feeling well overall from a cardiovascular standpoint. She admits she does not have an exercise routine, but is able to perform her daily household activities and help care for her mother, with whom she has lived for over 20 years. She denies any exertional chest pain. Pt reports her depression has gotten worse, and is not medicated for this. She has seen a psychiatrist in the past. Pt denies suicidal ideation. Pt reports some baseline left-sided pain, from her head to her toes. Pt has a history of complex migraines, and sees a neurologist. Patient denies chest pain, palpitations, shortness of breath, edema, dizziness, and syncope.      Allergies   Allergen Reactions   • Aspirin Nausea And Vomiting       Current Outpatient Medications:   •  atorvastatin (LIPITOR) 80 MG tablet, TAKE 1 TABLET BY MOUTH EVERY DAY, Disp: 90 tablet, Rfl: 3  •  Cetirizine HCl (ZYRTEC ALLERGY PO), Take  by mouth., Disp: , Rfl:   •  cyclobenzaprine (FLEXERIL) 10 MG tablet, Take 1 tablet by mouth 3 (Three) Times a Day As Needed for Muscle Spasms., Disp: 90 tablet, Rfl: 0  •  fluticasone (FLONASE) 50 MCG/ACT nasal spray, 2 sprays into the nostril(s) as directed by provider Daily., Disp: 3 bottle, Rfl: 1  •  metoprolol tartrate (LOPRESSOR) 25 MG tablet, Take 1 tablet by mouth 2 (Two) Times a Day.,  "Disp: 60 tablet, Rfl: 12  •  pantoprazole (PROTONIX) 40 MG EC tablet, Take 1 tablet by mouth Daily., Disp: 90 tablet, Rfl: 1  •  rizatriptan (MAXALT) 10 MG tablet, PLEASE SEE ATTACHED FOR DETAILED DIRECTIONS, Disp: , Rfl: 5    The following portions of the patient's history were reviewed and updated as appropriate: allergies, current medications, past family history, past medical history, past social history, past surgical history and problem list.    ROS  Review of Systems   Constitution: Negative for chills, fatigue, fever, generalized weakness.   Cardiovascular: Negative for chest pain, claudication, dyspnea on exertion, leg swelling, orthopnea, palpitations, paroxysmal nocturnal dyspnea and syncope.   Respiratory: Negative for cough, shortness of breath, and wheezing.  HENT: Negative for ear pain, nosebleeds, and tinnitus.  Gastrointestinal: Negative for abdominal pain, constipation, diarrhea, nausea and vomiting.   Genitourinary: No urinary symptoms.  Musculoskeletal: Negative for muscle cramps.  Neurological: Negative for dizziness, loss of balance, numbness, and symptoms of stroke. Positive for headaches.  Psychiatric: Positive for depression and anxiety.    All other systems reviewed and are negative.    Objective:     /64 (BP Location: Left arm, Patient Position: Sitting)   Pulse 71   Ht 157.5 cm (62\")   Wt 102 kg (225 lb)   SpO2 94%   BMI 41.15 kg/m²        Physical Exam  Constitutional: Patient appears well-developed and well-nourished.   HENT: HEENT exam unremarkable.   Neck: Neck supple. No JVD present. No carotid bruits.   Cardiovascular: Normal rate, regular rhythm and normal heart sounds. Faint systolic murmur heard.   2+ symmetric pulses.   Pulmonary/Chest: Breath sounds normal. Does not exhibit tenderness.   Abdominal: Abdomen benign.   Musculoskeletal: Does not exhibit edema.   Neurological: Neurological exam unremarkable.   Vitals reviewed.    Lab Review:   Lab Results   Component " Value Date    GLUCOSE 95 09/03/2019    BUN 9 09/03/2019    CREATININE 0.55 (L) 09/03/2019    EGFRIFNONA 114 09/03/2019    EGFRIFAFRI 138 09/03/2019    BCR 16.4 09/03/2019    K 4.3 09/03/2019    CO2 29.2 (H) 09/03/2019    CALCIUM 9.5 09/03/2019    ALBUMIN 4.60 09/03/2019    AST 13 09/03/2019    ALT 12 09/03/2019     Lab Results   Component Value Date    CHOL 153 09/03/2019    TRIG 108 09/03/2019    HDL 46 09/03/2019    LDL 85 09/03/2019      Lab Results   Component Value Date    WBC 9.34 12/03/2019    HGB 14.0 12/03/2019    HCT 42.9 12/03/2019    MCV 90.7 12/03/2019     12/03/2019     Lab Results   Component Value Date    TSH 2.100 09/03/2019     Lab Results   Component Value Date    HGBA1C 6.00 (H) 09/03/2019        Procedures       Assessment:      Diagnosis Plan   1. Essential hypertension  Well-controlled, continue metoprolol.   2. Familial hypercholesterolemia  Well-controlled, continue atorvastatin 80 mg.   3. Diastolic dysfunction Stable, no current heart failure symptoms.   4. Heart murmur Only mild AI per echocardiogram in November 2018.   5. Depression Start Lexapro 10 mg daily. Referral sent to establish care with psychiatrist for treatment of patient's depression.      Plan:   Referral sent to see a psychiatric.  Empirically prescribed Lexapro 10 mg daily.  Stable cardiac status overall.  Continue current medications.   FU in 12 MO with same-day echocardiogram to follow-up on aortic insufficiency, sooner as needed.  Thank you for allowing us to participate in the care of your patient.     Scribed for Benjamín Martinez MD by Marichuy Guerrier. 1/31/2020  2:03 PM      I, Benjamín Martinez MD, personally performed the services described in this documentation as scribed by the above named individual in my presence, and it is both accurate and complete.  1/31/2020  2:24 PM        Please note that portions of this note may have been completed with a voice recognition program. Efforts were made to edit the dictations,  but occasionally words are mistranscribed.

## 2020-02-21 ENCOUNTER — OFFICE VISIT (OUTPATIENT)
Dept: FAMILY MEDICINE CLINIC | Facility: CLINIC | Age: 60
End: 2020-02-21

## 2020-02-21 ENCOUNTER — TELEPHONE (OUTPATIENT)
Dept: FAMILY MEDICINE CLINIC | Facility: CLINIC | Age: 60
End: 2020-02-21

## 2020-02-21 VITALS
OXYGEN SATURATION: 95 % | HEART RATE: 76 BPM | HEIGHT: 62 IN | TEMPERATURE: 97.5 F | RESPIRATION RATE: 20 BRPM | BODY MASS INDEX: 40.7 KG/M2 | SYSTOLIC BLOOD PRESSURE: 126 MMHG | DIASTOLIC BLOOD PRESSURE: 72 MMHG | WEIGHT: 221.2 LBS

## 2020-02-21 DIAGNOSIS — R39.14 FEELING OF INCOMPLETE BLADDER EMPTYING: Primary | ICD-10-CM

## 2020-02-21 DIAGNOSIS — M51.36 DDD (DEGENERATIVE DISC DISEASE), LUMBAR: ICD-10-CM

## 2020-02-21 DIAGNOSIS — K21.00 CHRONIC REFLUX ESOPHAGITIS: ICD-10-CM

## 2020-02-21 PROBLEM — M51.369 DDD (DEGENERATIVE DISC DISEASE), LUMBAR: Status: ACTIVE | Noted: 2020-02-21

## 2020-02-21 LAB
BILIRUB BLD-MCNC: NEGATIVE MG/DL
CLARITY, POC: CLEAR
COLOR UR: YELLOW
GLUCOSE UR STRIP-MCNC: NEGATIVE MG/DL
KETONES UR QL: NEGATIVE
LEUKOCYTE EST, POC: NEGATIVE
NITRITE UR-MCNC: NEGATIVE MG/ML
PH UR: 5.5 [PH] (ref 5–8)
PROT UR STRIP-MCNC: NEGATIVE MG/DL
RBC # UR STRIP: ABNORMAL /UL
SP GR UR: 1.02 (ref 1–1.03)
UROBILINOGEN UR QL: NORMAL

## 2020-02-21 PROCEDURE — 81003 URINALYSIS AUTO W/O SCOPE: CPT | Performed by: FAMILY MEDICINE

## 2020-02-21 PROCEDURE — 99214 OFFICE O/P EST MOD 30 MIN: CPT | Performed by: FAMILY MEDICINE

## 2020-02-21 RX ORDER — PANTOPRAZOLE SODIUM 40 MG/1
40 TABLET, DELAYED RELEASE ORAL DAILY
Qty: 90 TABLET | Refills: 1 | Status: SHIPPED | OUTPATIENT
Start: 2020-02-21 | End: 2020-08-11

## 2020-02-21 NOTE — TELEPHONE ENCOUNTER
----- Message from Sebastian Pal MD sent at 2/21/2020  4:27 PM EST -----  Regarding: RE: ORDER   Patient already had mammogram results today when I saw her.  I do not know what they would need in order for.  I have never discussed any kind of breast abnormality with this patient until today.  She had copy of her result from  showing that she has some nodules on her breast that they would like repeat imaging done in August.  She did not mention needing a new order for this.  ----- Message -----  From: Elina Pendleton  Sent: 2/20/2020   3:34 PM EST  To: Sebastian Pal MD  Subject: FW: ORDER                                        Was there a mammogram supposed to be ordered for this patient?    ----- Message -----  From: Ene Doss  Sent: 2/20/2020   8:49 AM EST  To: Elina Pendleton  Subject: RE: ORDER                                        No and I don't see any orders for mammogram in the chart.   ----- Message -----  From: Elina Pendleton  Sent: 2/19/2020   5:13 PM EST  To: Ene Doss  Subject: FW: ORDER                                        Do you know anything about this?    ----- Message -----  From: Lesley Wood RegSched Rep  Sent: 2/19/2020   2:24 PM EST  To: Elina Pendleton  Subject: ORDER                                             CALLED ABOUT GETTING AN ORDER FAXED [phone] FOR LEFT DIAGNOSTIC MAMMOGRAM. SHE STATED SHE NEEDS IT SENt BY 3PM OR SHE CANT SEE THE PT FOR THE APPT TOMORROW.

## 2020-02-21 NOTE — ASSESSMENT & PLAN NOTE
Will check point-of-care UA today and contact patient with results.  Patient was given referral to urology for further evaluation and treatment.

## 2020-02-21 NOTE — ASSESSMENT & PLAN NOTE
Patient's back pain is chronic and stable.  She has had some relief with Flexeril but is not currently taking.  Patient refuses physical therapy.

## 2020-02-21 NOTE — PROGRESS NOTES
Dee Grider is a 59 y.o. female who presents today for Follow-up (back pain and GERD)      Ms. Grider is a 59 yr old female who presents today for f/u for her back pain.    The Pt reports that she feels that she does not completely empty her bladder each time she urinates. She states that this has been ongoing for several years and that it has not changed in nature. She states that a few times a month she has dysuria and frequency. She believes that the dysuria has become more frequent over the past couple of months. The Pt denies flank pain, fever, N/V/C/D.  Patient states that her GERD symptoms are well controlled when she takes Protonix.  She is not been following specific diet.  She has had no adverse effects from Protonix.    Back Pain   This is a chronic problem. The current episode started more than 1 year ago. The problem occurs constantly. The problem has been waxing and waning since onset. The pain is present in the thoracic spine, sacro-iliac, lumbar spine and gluteal. The quality of the pain is described as aching and shooting. The pain radiates to the left thigh, left knee and left foot. The pain is at a severity of 7/10. The pain is moderate. The pain is the same all the time. The symptoms are aggravated by standing. Stiffness is present all day. Associated symptoms include dysuria, headaches, leg pain (left leg), numbness (left leg when standing) and paresthesias (left leg when standing). Pertinent negatives include no abdominal pain, bladder incontinence, bowel incontinence, chest pain, fever, paresis, pelvic pain, perianal numbness, tingling, weakness or weight loss. Risk factors include menopause, sedentary lifestyle and obesity. She has tried ice, heat, muscle relaxant and bed rest (stretching) for the symptoms. The treatment provided mild relief.        Review of Systems   Constitutional: Negative for fever and unexpected weight loss.   HENT: Negative for congestion, ear pain and sore  throat.    Eyes: Negative for visual disturbance.   Respiratory: Negative for cough, shortness of breath and wheezing.    Cardiovascular: Negative for chest pain and palpitations.   Gastrointestinal: Positive for GERD. Negative for abdominal pain, blood in stool, bowel incontinence, constipation, diarrhea, nausea and vomiting.   Endocrine: Negative for polydipsia and polyuria.   Genitourinary: Positive for difficulty urinating, dysuria and frequency. Negative for pelvic pain, urinary incontinence and vaginal discharge.   Musculoskeletal: Positive for arthralgias, back pain and myalgias. Negative for joint swelling.   Skin: Negative for rash and skin lesions.   Allergic/Immunologic: Negative for environmental allergies.   Neurological: Positive for numbness (left leg when standing) and paresthesias (left leg when standing). Negative for tingling, seizures, syncope and weakness.   Hematological: Does not bruise/bleed easily.   Psychiatric/Behavioral: Positive for dysphoric mood. Negative for suicidal ideas.        The following portions of the patient's history were reviewed and updated as appropriate: allergies, current medications, past family history, past medical history, past social history, past surgical history and problem list.    Current Outpatient Medications on File Prior to Visit   Medication Sig Dispense Refill   • atorvastatin (LIPITOR) 80 MG tablet TAKE 1 TABLET BY MOUTH EVERY DAY 90 tablet 3   • Cetirizine HCl (ZYRTEC ALLERGY PO) Take  by mouth.     • escitalopram (LEXAPRO) 10 MG tablet Take 1 tablet by mouth Daily. 90 tablet 3   • fluticasone (FLONASE) 50 MCG/ACT nasal spray 2 sprays into the nostril(s) as directed by provider Daily. 3 bottle 1   • metoprolol tartrate (LOPRESSOR) 25 MG tablet Take 1 tablet by mouth 2 (Two) Times a Day. 60 tablet 12   • rizatriptan (MAXALT) 10 MG tablet PLEASE SEE ATTACHED FOR DETAILED DIRECTIONS  5   • [DISCONTINUED] pantoprazole (PROTONIX) 40 MG EC tablet Take 1  "tablet by mouth Daily. 90 tablet 1   • [DISCONTINUED] cyclobenzaprine (FLEXERIL) 10 MG tablet Take 1 tablet by mouth 3 (Three) Times a Day As Needed for Muscle Spasms. 90 tablet 0     No current facility-administered medications on file prior to visit.        Allergies   Allergen Reactions   • Aspirin Nausea And Vomiting        Visit Vitals  /72   Pulse 76   Temp 97.5 °F (36.4 °C) (Temporal)   Resp 20   Ht 157.5 cm (62\")   Wt 100 kg (221 lb 3.2 oz)   SpO2 95%   BMI 40.46 kg/m²        Physical Exam   Constitutional: She is oriented to person, place, and time. She appears well-developed and well-nourished. No distress.   HENT:   Head: Atraumatic.   Eyes: EOM are normal.   Neck: Normal range of motion. Neck supple.   Cardiovascular: Normal rate, regular rhythm and intact distal pulses. Exam reveals no gallop and no friction rub.   Murmur (known) heard.  Pulmonary/Chest: Effort normal and breath sounds normal. No stridor. No respiratory distress. She has no wheezes. She has no rales.   Abdominal: Soft. Bowel sounds are normal. She exhibits no distension and no mass. There is no tenderness. There is no rebound and no guarding.   Musculoskeletal: She exhibits no edema.        Thoracic back: She exhibits tenderness (Paraspinal muscle tenderness) and pain. She exhibits normal range of motion, no bony tenderness, no swelling, no edema, no deformity, no laceration and no spasm.        Lumbar back: She exhibits tenderness (Paraspinal muscle tenderness) and pain. She exhibits normal range of motion, no bony tenderness, no swelling, no edema, no deformity, no laceration and no spasm.   Neurological: She is alert and oriented to person, place, and time.   Skin: Skin is warm and dry. She is not diaphoretic.   Psychiatric: She has a normal mood and affect. Her behavior is normal.             Problems Addressed this Visit        Digestive    Chronic reflux esophagitis     Patient's acid reflux is well controlled with Protonix. "  We will continue this at this time.         Relevant Medications    pantoprazole (PROTONIX) 40 MG EC tablet       Nervous and Auditory    Feeling of incomplete bladder emptying - Primary     Will check point-of-care UA today and contact patient with results.  Patient was given referral to urology for further evaluation and treatment.         Relevant Orders    Ambulatory Referral to Urology    POC Urinalysis Dipstick, Automated       Musculoskeletal and Integument    DDD (degenerative disc disease), lumbar     Patient's back pain is chronic and stable.  She has had some relief with Flexeril but is not currently taking.  Patient refuses physical therapy.               Return in about 7 months (around 9/7/2020) for Medicare Wellness.    Parts of this office note have been dictated by voice recognition software. Grammatical and/or spelling errors may be present.    Sebastian Pal MD   2/21/2020

## 2020-02-22 ENCOUNTER — TELEPHONE (OUTPATIENT)
Dept: FAMILY MEDICINE CLINIC | Facility: CLINIC | Age: 60
End: 2020-02-22

## 2020-02-22 NOTE — TELEPHONE ENCOUNTER
SPOKE WITH PT ABOUT UA RESULTS. ADVISED PT TO KEEP APPT WITH UROLOGY TO DISCUSS URINARY PROBLEMS. PT VOICED UNDERSTANDING. ADVISED TO CALL BACK WITH ANY QUESTIONS.

## 2020-02-22 NOTE — TELEPHONE ENCOUNTER
----- Message from Sebastian Pal MD sent at 2/21/2020  5:29 PM EST -----  Please the patient know that her UA was unremarkable except for trace amount of blood.  No signs of infection at this time.  Patient should keep referral to urologist for further evaluation and treatment of urinary symptoms.

## 2020-04-27 ENCOUNTER — OFFICE VISIT (OUTPATIENT)
Dept: GASTROENTEROLOGY | Facility: CLINIC | Age: 60
End: 2020-04-27

## 2020-04-27 DIAGNOSIS — Z51.81 ENCOUNTER FOR MONITORING LONG-TERM PROTON PUMP INHIBITOR THERAPY: ICD-10-CM

## 2020-04-27 DIAGNOSIS — Z29.9 PREVENTIVE MEASURE: ICD-10-CM

## 2020-04-27 DIAGNOSIS — Z79.899 ENCOUNTER FOR MONITORING LONG-TERM PROTON PUMP INHIBITOR THERAPY: ICD-10-CM

## 2020-04-27 DIAGNOSIS — K21.9 CHRONIC GERD: Primary | ICD-10-CM

## 2020-04-27 DIAGNOSIS — K59.09 CHRONIC CONSTIPATION: ICD-10-CM

## 2020-04-27 PROCEDURE — 99442 PR PHYS/QHP TELEPHONE EVALUATION 11-20 MIN: CPT | Performed by: NURSE PRACTITIONER

## 2020-04-27 NOTE — PROGRESS NOTES
GASTROENTEROLOGY OUTPATIENT ESTABLISHED PATIENT NOTE Phone/Telemedicine Visit  Patient: ROMA CABALLERO : 1960  Date of Service: 2020  CC: Follow-up for GERD     Assessment/Plan                                             ASSESSMENT & PLANS     Chronic GERD  Encounter for monitoring long-term proton pump inhibitor therapy  · Follow up with PCP for annual physical exam with UA and bone scan. PCP is copied in this note  I explained to pt long-term side effects of any PPI, which include but not limited to, increase risks for diarrhea (CDiff, microscopic colitis, etc), decreased absorption of certain vitamin and mineral, osteopenia, kidney disease, etc. I encourage pt of conservative nonpharmacologic measures (anti-reflux lifestyles and dietary changes)    Pt has expressed that pt has difficulty being off PPI due to having recurrence of GERD sx and would like to continue PPI with close monitoring.     Decrease Protonix to once every other day.       Chronic constipation  · Continue high fiber diet  · Repeat colonoscopy Oct 2021    Follow Up:  PRN      DISCUSSION: I spent 15 minutes on the phone to obtain patient's health status update, to provide management recommendations, and to provide patient counseling. The above plan was delineated in details with pt and all questions and concerns were answered.  Patient is also given contact information.  Patient is to return as scheduled or sooner if new problems arise.     Subjective                                                     SUBJECTIVE   History of Present Illness  Ms. Roma Caballero is a 59 y.o. female presents as a telemedicine visit via telephone for Follow-up on GERD.   Concerned about being on long term PPI for many years and its effect on bone health.  She reports of not having bone scan done for a while. Unable to be off PPI. She denies generalized weakness, unsteady gait, falls or near falls. Still smokes. About 0.3 pack a day or so Rectal  bleeding has resolved.  Constipation controlled w/ fiber. Able to have more regular BMs. Pt denies dark black stools or bright red blood in the stools, in the toilet bowl, or on the toilet tissue.    EGD, done on 11/8/19 by Dr Goldstein, showed small hiatal hernia, LA Grade  A esophagitis, mild gastritis, and normal duodenum.   Colonoscopy, done on 10/1/18 by Dr Goldstein, showed 2 adenoma polyps and one was over 1 cm and hemorrhoids.     Review of Systems   ROS:Review of Systems  Objective                                                           OBJECTIVE   Allergy: Pt is allergic to aspirin.  MEDS: •  atorvastatin (LIPITOR) 80 MG tablet, TAKE 1 TABLET BY MOUTH EVERY DAY, Disp: 90 tablet, Rfl: 3  •  Cetirizine HCl (ZYRTEC ALLERGY PO), Take  by mouth., Disp: , Rfl:   •  escitalopram (LEXAPRO) 10 MG tablet, Take 1 tablet by mouth Daily., Disp: 90 tablet, Rfl: 3  •  fluticasone (FLONASE) 50 MCG/ACT nasal spray, 2 sprays into the nostril(s) as directed by provider Daily., Disp: 3 bottle, Rfl: 1  •  metoprolol tartrate (LOPRESSOR) 25 MG tablet, Take 1 tablet by mouth 2 (Two) Times a Day., Disp: 60 tablet, Rfl: 12  •  pantoprazole (PROTONIX) 40 MG EC tablet, Take 1 tablet by mouth Daily., Disp: 90 tablet, Rfl: 1  •  rizatriptan (MAXALT) 10 MG tablet, PLEASE SEE ATTACHED FOR DETAILED DIRECTIONS, Disp: , Rfl: 5    Medications reviewed w/ pt    Lab Results   Component Value Date    WBC 9.34 12/03/2019    WBC 8.79 09/03/2019    WBC 8.45 08/31/2018    EOSABS 0.52 (H) 12/03/2019    EOSABS 0.43 (H) 09/03/2019    EOSABS 0.38 (H) 08/31/2018    HGB 14.0 12/03/2019    HGB 13.6 09/03/2019    HGB 14.2 08/31/2018    HCT 42.9 12/03/2019    HCT 43.3 09/03/2019    HCT 44.1 (H) 08/31/2018    MCV 90.7 12/03/2019    MCV 96.9 09/03/2019    MCV 94.0 08/31/2018    MCHC 32.6 12/03/2019    MCHC 31.4 (L) 09/03/2019    MCHC 32.2 08/31/2018     12/03/2019     09/03/2019     08/31/2018     Lab Results   Component Value Date     RDW 12.8 12/03/2019    RDW 13.7 09/03/2019    RDW 13.7 08/31/2018    MCHC 32.6 12/03/2019    MCHC 31.4 (L) 09/03/2019    MCHC 32.2 08/31/2018    LABIRON 22 12/03/2019    FERRITIN 136.00 12/03/2019     Lab Results   Component Value Date     09/03/2019    K 4.3 09/03/2019     09/03/2019    CO2 29.2 (H) 09/03/2019    BUN 9 09/03/2019    BUN 14 01/18/2019    BUN 11 08/31/2018    CREATININE 0.55 (L) 09/03/2019    CREATININE 0.63 01/18/2019    CREATININE 0.66 08/31/2018    EGFRIFNONA 114 09/03/2019    EGFRIFNONA 98 01/18/2019    EGFRIFNONA 93 08/31/2018    GLUCOSE 95 09/03/2019    CALCIUM 9.5 09/03/2019    ANIONGAP 8.8 09/03/2019     Lab Results   Component Value Date    AST 13 09/03/2019    AST 22 01/18/2019    AST 17 08/31/2018    ALT 12 09/03/2019    ALT 29 01/18/2019    ALT 23 08/31/2018    ALKPHOS 100 09/03/2019    ALKPHOS 110 (H) 01/18/2019    ALKPHOS 84 08/31/2018    BILITOT 0.3 09/03/2019    BILITOT 0.3 01/18/2019    BILITOT 0.5 08/31/2018    ALBUMIN 4.60 09/03/2019    ALBUMIN 4.18 01/18/2019    ALBUMIN 4.84 (H) 08/31/2018      Lab Results   Component Value Date    HGBA1C 6.00 (H) 09/03/2019    HGBA1C 6.30 (H) 08/31/2018    HGBA1C 5.90 (H) 07/03/2017    TSH 2.100 09/03/2019    TSH 2.530 08/31/2018    TSH 1.466 07/03/2017      Lab Results   Component Value Date    HEPCVIRUSABY Non-Reactive 07/03/2017    HPYLORIBR Negative 12/03/2019    CRP 0.26 09/03/2019    CRP 1.02 (H) 07/03/2017      Wt Readings from Last 5 Encounters:   02/21/20 100 kg (221 lb 3.2 oz)   01/31/20 102 kg (225 lb)   12/03/19 100 kg (220 lb 12.8 oz)   11/13/19 101 kg (223 lb 6.4 oz)   11/08/19 101 kg (221 lb 9.6 oz)   body mass index is unknown because there is no height or weight on file., , ,    Physical Exam       Psych Oriented to time, place, and person.  Appropriate affect       Pt care team: Allyn LUJAN & LE Cotter  04/27/20 9:23 AM  [facility]--Gastroenterology  [phone]    CC: , Sebastian HARTLEY MD   [address] / [facility] [address] FAX:[phone]

## 2020-06-03 DIAGNOSIS — R01.1 MURMUR, HEART: Primary | ICD-10-CM

## 2020-08-11 DIAGNOSIS — K21.00 CHRONIC REFLUX ESOPHAGITIS: ICD-10-CM

## 2020-08-11 RX ORDER — PANTOPRAZOLE SODIUM 40 MG/1
TABLET, DELAYED RELEASE ORAL
Qty: 90 TABLET | Refills: 0 | Status: SHIPPED | OUTPATIENT
Start: 2020-08-11 | End: 2020-11-03

## 2020-08-20 ENCOUNTER — OFFICE VISIT (OUTPATIENT)
Dept: FAMILY MEDICINE CLINIC | Facility: CLINIC | Age: 60
End: 2020-08-20

## 2020-08-20 ENCOUNTER — TELEPHONE (OUTPATIENT)
Dept: FAMILY MEDICINE CLINIC | Facility: CLINIC | Age: 60
End: 2020-08-20

## 2020-08-20 VITALS
OXYGEN SATURATION: 94 % | SYSTOLIC BLOOD PRESSURE: 128 MMHG | WEIGHT: 224.4 LBS | RESPIRATION RATE: 16 BRPM | TEMPERATURE: 97.5 F | HEART RATE: 80 BPM | DIASTOLIC BLOOD PRESSURE: 80 MMHG | HEIGHT: 62 IN | BODY MASS INDEX: 41.3 KG/M2

## 2020-08-20 DIAGNOSIS — R30.0 BURNING WITH URINATION: Primary | ICD-10-CM

## 2020-08-20 DIAGNOSIS — N20.0 KIDNEY STONE ON LEFT SIDE: ICD-10-CM

## 2020-08-20 DIAGNOSIS — R10.9 ACUTE LEFT FLANK PAIN: ICD-10-CM

## 2020-08-20 DIAGNOSIS — N63.20 BREAST MASS, LEFT: ICD-10-CM

## 2020-08-20 DIAGNOSIS — R92.8 OTHER ABNORMAL AND INCONCLUSIVE FINDINGS ON DIAGNOSTIC IMAGING OF BREAST: ICD-10-CM

## 2020-08-20 LAB
BILIRUB BLD-MCNC: NEGATIVE MG/DL
CLARITY, POC: CLEAR
COLOR UR: YELLOW
GLUCOSE UR STRIP-MCNC: NEGATIVE MG/DL
KETONES UR QL: NEGATIVE
LEUKOCYTE EST, POC: NEGATIVE
NITRITE UR-MCNC: NEGATIVE MG/ML
PH UR: 6 [PH] (ref 5–8)
PROT UR STRIP-MCNC: ABNORMAL MG/DL
RBC # UR STRIP: ABNORMAL /UL
SP GR UR: 1.02 (ref 1–1.03)
UROBILINOGEN UR QL: NORMAL

## 2020-08-20 PROCEDURE — 81003 URINALYSIS AUTO W/O SCOPE: CPT | Performed by: FAMILY MEDICINE

## 2020-08-20 PROCEDURE — 99214 OFFICE O/P EST MOD 30 MIN: CPT | Performed by: FAMILY MEDICINE

## 2020-08-20 RX ORDER — BACLOFEN 10 MG/1
10 TABLET ORAL 3 TIMES DAILY PRN
Qty: 30 TABLET | Refills: 0 | Status: SHIPPED | OUTPATIENT
Start: 2020-08-20 | End: 2020-09-29

## 2020-08-20 RX ORDER — TAMSULOSIN HYDROCHLORIDE 0.4 MG/1
1 CAPSULE ORAL DAILY
Qty: 15 CAPSULE | Refills: 0 | Status: SHIPPED | OUTPATIENT
Start: 2020-08-20 | End: 2020-08-28

## 2020-08-20 RX ORDER — HYDROCODONE BITARTRATE AND ACETAMINOPHEN 5; 325 MG/1; MG/1
1 TABLET ORAL EVERY 6 HOURS PRN
Qty: 30 TABLET | Refills: 0 | Status: SHIPPED | OUTPATIENT
Start: 2020-08-20 | End: 2020-09-29

## 2020-08-20 NOTE — PROGRESS NOTES
Dee Grider is a 60 y.o. female who presents today for Difficulty Urinating and Breast Problem (wanting diagnostic mammogram)      Difficulty Urinating   This is a new problem. The current episode started yesterday. The problem occurs constantly. The problem has been gradually improving. Associated symptoms include abdominal pain (suprapubic), anorexia, chills, diaphoresis, headaches, nausea, urinary symptoms (dribbling, incomplete emptying, urgency) and vomiting. Pertinent negatives include no arthralgias, change in bowel habit, chest pain, congestion, coughing, fatigue, fever, joint swelling, myalgias, neck pain, numbness, rash, sore throat, swollen glands, vertigo, visual change or weakness. Associated symptoms comments: Left sided flank pain. Nothing aggravates the symptoms. She has tried nothing for the symptoms.   Bladder Problem   This is a chronic problem. The current episode started more than 1 year ago. The problem occurs constantly. The problem has been unchanged. Associated symptoms include abdominal pain (suprapubic), anorexia, chills, diaphoresis, headaches, nausea, urinary symptoms (dribbling, incomplete emptying, urgency) and vomiting. Pertinent negatives include no arthralgias, change in bowel habit, chest pain, congestion, coughing, fatigue, fever, joint swelling, myalgias, neck pain, numbness, rash, sore throat, swollen glands, vertigo, visual change or weakness. Nothing aggravates the symptoms. She has tried nothing for the symptoms.          Review of Systems   Constitutional: Positive for chills and diaphoresis. Negative for fatigue, fever and unexpected weight loss.   HENT: Positive for ear pain (left ear), hearing loss (left ear) and tinnitus (bilateral). Negative for congestion, sore throat and swollen glands.    Eyes: Negative for blurred vision, pain and visual disturbance.        Eyelid pain, erythema, swelling   Respiratory: Negative for cough, chest tightness, shortness of breath and  wheezing.    Cardiovascular: Negative for chest pain, palpitations and leg swelling.   Gastrointestinal: Positive for abdominal pain (suprapubic), anorexia, nausea, vomiting and GERD. Negative for blood in stool, change in bowel habit, constipation and diarrhea.   Endocrine: Negative for polydipsia and polyuria.   Genitourinary: Positive for difficulty urinating, dysuria (burning), flank pain (left sided), frequency and urgency. Negative for hematuria.   Musculoskeletal: Negative for arthralgias, joint swelling, myalgias and neck pain.        Fibromyalgia     Skin: Negative for rash and skin lesions.   Allergic/Immunologic: Negative for environmental allergies.   Neurological: Positive for dizziness. Negative for vertigo, seizures, syncope, weakness, light-headedness, numbness and confusion.   Hematological: Does not bruise/bleed easily.   Psychiatric/Behavioral: Negative for suicidal ideas.        The following portions of the patient's history were reviewed and updated as appropriate: allergies, current medications, past family history, past medical history, past social history, past surgical history and problem list.    Current Outpatient Medications on File Prior to Visit   Medication Sig Dispense Refill   • atorvastatin (LIPITOR) 80 MG tablet TAKE 1 TABLET BY MOUTH EVERY DAY 90 tablet 3   • Cetirizine HCl (ZYRTEC ALLERGY PO) Take  by mouth.     • escitalopram (LEXAPRO) 10 MG tablet Take 1 tablet by mouth Daily. 90 tablet 3   • metoprolol tartrate (LOPRESSOR) 25 MG tablet Take 1 tablet by mouth 2 (Two) Times a Day. 60 tablet 12   • pantoprazole (PROTONIX) 40 MG EC tablet TAKE 1 TABLET BY MOUTH EVERY DAY 90 tablet 0   • rizatriptan (MAXALT) 10 MG tablet PLEASE SEE ATTACHED FOR DETAILED DIRECTIONS  5   • fluticasone (FLONASE) 50 MCG/ACT nasal spray 2 sprays into the nostril(s) as directed by provider Daily. 3 bottle 1     No current facility-administered medications on file prior to visit.        Allergies  "  Allergen Reactions   • Aspirin Nausea And Vomiting        Visit Vitals  /80   Pulse 80   Temp 97.5 °F (36.4 °C) (Temporal)   Resp 16   Ht 157.5 cm (62\")   Wt 102 kg (224 lb 6.4 oz)   SpO2 94%   BMI 41.04 kg/m²        Physical Exam   Constitutional: She is oriented to person, place, and time. She appears well-developed and well-nourished. No distress.   HENT:   Head: Atraumatic.   Eyes: EOM are normal.   Neck: Normal range of motion. Neck supple.   Cardiovascular: Normal rate, regular rhythm, normal heart sounds and intact distal pulses. Exam reveals no gallop and no friction rub.   No murmur heard.  Pulmonary/Chest: Effort normal and breath sounds normal. No respiratory distress. She has no wheezes. She has no rales.   Abdominal: Soft. Bowel sounds are normal. She exhibits no distension. There is tenderness. There is CVA tenderness.   Musculoskeletal: She exhibits no edema.   Neurological: She is alert and oriented to person, place, and time.   Skin: Skin is warm and dry. She is not diaphoretic.   Psychiatric: She has a normal mood and affect. Her behavior is normal.             Problems Addressed this Visit        Nervous and Auditory    Burning with urination - Primary     Patient has signs symptoms consistent with kidney stone versus acute cystitis versus pyelonephritis.  Patient has no fever and UA is not suggestive of infection at this time.  Most likely symptoms are related to kidney stone.  Patient will not be started on antibiotics at this time.  She was given baclofen and tamsulosin to help aid passage of presumed stone.  She was started on Norco as needed for pain management.  Will obtain CT abdomen and pelvis with stone protocol for further evaluation.  Patient will contact clinic if symptoms worsen or should get cystoscopy any signs of infection which were reviewed with her.  Signs of infection appear will start on antibiotics as necessary.  If stone is present and patient is unable to pass will " refer to urology for further evaluation and treatment.         Relevant Medications    baclofen (LIORESAL) 10 MG tablet    tamsulosin (FLOMAX) 0.4 MG capsule 24 hr capsule    HYDROcodone-acetaminophen (NORCO) 5-325 MG per tablet    Other Relevant Orders    POC Urinalysis Dipstick, Automated (Completed)    CT Abdomen Pelvis Stone Protocol    Acute left flank pain    Relevant Medications    baclofen (LIORESAL) 10 MG tablet    tamsulosin (FLOMAX) 0.4 MG capsule 24 hr capsule    HYDROcodone-acetaminophen (NORCO) 5-325 MG per tablet    Other Relevant Orders    POC Urinalysis Dipstick, Automated (Completed)    CT Abdomen Pelvis Stone Protocol       Other    Breast mass, left    Relevant Orders    Mammo diagnostic digital tomosynthesis left w CAD    Other abnormal and inconclusive findings on diagnostic imaging of breast      Patient was given another order for follow-up mammogram and [facility].         Relevant Orders    Mammo diagnostic digital tomosynthesis left w CAD      Other Visit Diagnoses     Kidney stone on left side        Relevant Medications    baclofen (LIORESAL) 10 MG tablet    tamsulosin (FLOMAX) 0.4 MG capsule 24 hr capsule    HYDROcodone-acetaminophen (NORCO) 5-325 MG per tablet    Other Relevant Orders    CT Abdomen Pelvis Stone Protocol          Return if symptoms worsen or fail to improve.    Parts of this office note have been dictated by voice recognition software. Grammatical and/or spelling errors may be present.    Sebastian Pal MD   8/23/2020

## 2020-08-20 NOTE — PATIENT INSTRUCTIONS
Kidney Stones    Kidney stones are solid, rock-like deposits that form inside of the kidneys. The kidneys are a pair of organs that make urine. A kidney stone may form in a kidney and move into other parts of the urinary tract, including the tubes that connect the kidneys to the bladder (ureters), the bladder, and the tube that carries urine out of the body (urethra). As the stone moves through these areas, it can cause intense pain and block the flow of urine.  Kidney stones are created when high levels of certain minerals are found in the urine. The stones are usually passed out of the body through urination, but in some cases, medical treatment may be needed to remove them.  What are the causes?  Kidney stones may be caused by:  · A condition in which certain glands produce too much parathyroid hormone (primary hyperparathyroidism), which causes too much calcium buildup in the blood.  · A buildup of uric acid crystals in the bladder (hyperuricosuria). Uric acid is a chemical that the body produces when you eat certain foods. It usually exits the body in the urine.  · Narrowing (stricture) of one or both of the ureters.  · A kidney blockage that is present at birth (congenital obstruction).  · Past surgery on the kidney or the ureters, such as gastric bypass surgery.  What increases the risk?  The following factors may make you more likely to develop this condition:  · Having had a kidney stone in the past.  · Having a family history of kidney stones.  · Not drinking enough water.  · Eating a diet that is high in protein, salt (sodium), or sugar.  · Being overweight or obese.  What are the signs or symptoms?  Symptoms of a kidney stone may include:  · Pain in the side of the abdomen, right below the ribs (flank pain). Pain usually spreads (radiates) to the groin.  · Needing to urinate frequently or urgently.  · Painful urination.  · Blood in the urine (hematuria).  · Nausea.  · Vomiting.  · Fever and chills.  How  is this diagnosed?  This condition may be diagnosed based on:  · Your symptoms and medical history.  · A physical exam.  · Blood tests.  · Urine tests. These may be done before and after the stone passes out of your body through urination.  · Imaging tests, such as a CT scan, abdominal X-ray, or ultrasound.  · A procedure to examine the inside of the bladder (cystoscopy).  How is this treated?  Treatment for kidney stones depends on the size, location, and makeup of the stones. Kidney stones will often pass out of the body through urination. You may need to:  · Increase your fluid intake to help pass the stone. In some cases, you may be given fluids through an IV and may need to be monitored at the hospital.  · Take medicine for pain.  · Make changes in your diet to help prevent kidney stones from coming back.  Sometimes, medical procedures are needed to remove a kidney stone. This may involve:  · A procedure to break up kidney stones using:  ? A focused beam of light (laser therapy).  ? Shock waves (extracorporeal shock wave lithotripsy).  · Surgery to remove kidney stones. This may be needed if you have severe pain or have stones that block your urinary tract.  Follow these instructions at home:  Medicines  · Take over-the-counter and prescription medicines only as told by your health care provider.  · Ask your health care provider if the medicine prescribed to you requires you to avoid driving or using heavy machinery.  Eating and drinking  · Drink enough fluid to keep your urine pale yellow. You may be instructed to drink at least 8-10 glasses of water each day. This will help you pass the kidney stone.  · If directed, change your diet. This may include:  ? Limiting how much sodium you eat.  ? Eating more fruits and vegetables.  ? Limiting how much animal protein--such as red meat, poultry, fish, and eggs--you eat.  · Follow instructions from your health care provider about eating or drinking  restrictions.  General instructions  · Collect urine samples as told by your health care provider. You may need to collect a urine sample:  ? 24 hours after you pass the stone.  ? 8-12 weeks after passing the kidney stone, and every 6-12 months after that.  · Strain your urine every time you urinate, for as long as directed. Use the strainer that your health care provider recommends.  · Do not throw out the kidney stone after passing it. Keep the stone so it can be tested by your health care provider. Testing the makeup of your kidney stone may help prevent you from getting kidney stones in the future.  · Keep all follow-up visits as told by your health care provider. This is important. You may need follow-up X-rays or ultrasounds to make sure that your stone has passed.  How is this prevented?  To prevent another kidney stone:  · Drink enough fluid to keep your urine pale yellow. This is the best way to prevent kidney stones.  · Eat a healthy diet and follow recommendations from your health care provider about foods to avoid. You may be instructed to eat a low-protein diet. Recommendations vary depending on the type of kidney stone that you have.  · Maintain a healthy weight.  Where to find more information  · National Kidney Foundation (NKF): www.kidney.org  · [employer] (UCF): www.urologyhealth.org  Contact a health care provider if:  · You have pain that gets worse or does not get better with medicine.  Get help right away if:  · You have a fever or chills.  · You develop severe pain.  · You develop new abdominal pain.  · You faint.  · You are unable to urinate.  Summary  · Kidney stones are solid, rock-like deposits that form inside of the kidneys.  · Kidney stones can cause nausea, vomiting, blood in the urine, abdominal pain, and the urge to urinate frequently.  · Treatment for kidney stones depends on the size, location, and makeup of the stones. Kidney stones will often pass out of the body  through urination.  · Kidney stones can be prevented by drinking enough fluids, eating a healthy diet, and maintaining a healthy weight.  This information is not intended to replace advice given to you by your health care provider. Make sure you discuss any questions you have with your health care provider.  Document Released: 12/18/2006 Document Revised: 05/05/2020 Document Reviewed: 05/05/2020  Elsevier Patient Education © 2020 [employer]

## 2020-08-20 NOTE — TELEPHONE ENCOUNTER
PATIENT STATES SHE HAS AN APPOINTMENT AT  TOMORROW FOR THE MAMMOGRAM REFERRAL.  SHE STATES IT WAS PUT IN AT [facility] INSTEAD OF AT  AND THE PATIENT REPORTS THAT SHE HAS ALREADY BEEN GETTING THESE DONE AT [facility] FOR THE LAST YEAR.  PHONE FOR  EFRAIN -[phone]      PLEASE CALL PATIENT AND ADVISE [phone]

## 2020-08-21 NOTE — TELEPHONE ENCOUNTER
Patient is calling back to state that  did not get her Mammogram order.  Could this be sent again?  She can be reached at [phone]

## 2020-08-23 NOTE — ASSESSMENT & PLAN NOTE
Patient has signs symptoms consistent with kidney stone versus acute cystitis versus pyelonephritis.  Patient has no fever and UA is not suggestive of infection at this time.  Most likely symptoms are related to kidney stone.  Patient will not be started on antibiotics at this time.  She was given baclofen and tamsulosin to help aid passage of presumed stone.  She was started on Norco as needed for pain management.  Will obtain CT abdomen and pelvis with stone protocol for further evaluation.  Patient will contact clinic if symptoms worsen or should get cystoscopy any signs of infection which were reviewed with her.  Signs of infection appear will start on antibiotics as necessary.  If stone is present and patient is unable to pass will refer to urology for further evaluation and treatment.

## 2020-08-26 ENCOUNTER — HOSPITAL ENCOUNTER (OUTPATIENT)
Dept: CT IMAGING | Facility: HOSPITAL | Age: 60
Discharge: HOME | End: 2020-08-26
Admitting: FAMILY MEDICINE

## 2020-08-26 DIAGNOSIS — N20.0 KIDNEY STONE ON LEFT SIDE: ICD-10-CM

## 2020-08-26 DIAGNOSIS — R10.9 ACUTE LEFT FLANK PAIN: ICD-10-CM

## 2020-08-26 DIAGNOSIS — R30.0 BURNING WITH URINATION: ICD-10-CM

## 2020-08-26 PROCEDURE — 74176 CT ABD & PELVIS W/O CONTRAST: CPT

## 2020-08-27 DIAGNOSIS — N20.0 KIDNEY STONE ON LEFT SIDE: ICD-10-CM

## 2020-08-27 DIAGNOSIS — R10.9 ACUTE LEFT FLANK PAIN: ICD-10-CM

## 2020-08-27 DIAGNOSIS — R30.0 BURNING WITH URINATION: ICD-10-CM

## 2020-08-28 RX ORDER — TAMSULOSIN HYDROCHLORIDE 0.4 MG/1
CAPSULE ORAL
Qty: 15 CAPSULE | Refills: 0 | Status: SHIPPED | OUTPATIENT
Start: 2020-08-28 | End: 2020-09-29

## 2020-09-16 DIAGNOSIS — N20.0 KIDNEY STONE ON LEFT SIDE: ICD-10-CM

## 2020-09-16 DIAGNOSIS — R30.0 BURNING WITH URINATION: ICD-10-CM

## 2020-09-16 DIAGNOSIS — R10.9 ACUTE LEFT FLANK PAIN: ICD-10-CM

## 2020-09-17 RX ORDER — TAMSULOSIN HYDROCHLORIDE 0.4 MG/1
CAPSULE ORAL
Qty: 15 CAPSULE | Refills: 0 | OUTPATIENT
Start: 2020-09-17

## 2020-09-29 ENCOUNTER — OFFICE VISIT (OUTPATIENT)
Dept: FAMILY MEDICINE CLINIC | Facility: CLINIC | Age: 60
End: 2020-09-29

## 2020-09-29 VITALS
TEMPERATURE: 97.8 F | WEIGHT: 224 LBS | RESPIRATION RATE: 14 BRPM | OXYGEN SATURATION: 95 % | DIASTOLIC BLOOD PRESSURE: 76 MMHG | BODY MASS INDEX: 41.22 KG/M2 | SYSTOLIC BLOOD PRESSURE: 124 MMHG | HEART RATE: 71 BPM | HEIGHT: 62 IN

## 2020-09-29 DIAGNOSIS — Z00.00 MEDICARE ANNUAL WELLNESS VISIT, SUBSEQUENT: Primary | ICD-10-CM

## 2020-09-29 DIAGNOSIS — R73.03 PREDIABETES: ICD-10-CM

## 2020-09-29 LAB
ALBUMIN SERPL-MCNC: 4.2 G/DL (ref 3.5–5.2)
ALBUMIN/GLOB SERPL: 1.4 G/DL
ALP SERPL-CCNC: 111 U/L (ref 39–117)
ALT SERPL W P-5'-P-CCNC: 18 U/L (ref 1–33)
ANION GAP SERPL CALCULATED.3IONS-SCNC: 12.2 MMOL/L (ref 5–15)
AST SERPL-CCNC: 15 U/L (ref 1–32)
BASOPHILS # BLD AUTO: 0.08 10*3/MM3 (ref 0–0.2)
BASOPHILS NFR BLD AUTO: 1 % (ref 0–1.5)
BILIRUB SERPL-MCNC: 0.5 MG/DL (ref 0–1.2)
BUN SERPL-MCNC: 8 MG/DL (ref 6–20)
BUN/CREAT SERPL: 13.3 (ref 7–25)
CALCIUM SPEC-SCNC: 9.6 MG/DL (ref 8.6–10.5)
CHLORIDE SERPL-SCNC: 103 MMOL/L (ref 98–107)
CHOLEST SERPL-MCNC: 166 MG/DL (ref 0–200)
CO2 SERPL-SCNC: 25.8 MMOL/L (ref 22–29)
CREAT SERPL-MCNC: 0.6 MG/DL (ref 0.57–1)
DEPRECATED RDW RBC AUTO: 42.3 FL (ref 37–54)
EOSINOPHIL # BLD AUTO: 0.48 10*3/MM3 (ref 0–0.4)
EOSINOPHIL NFR BLD AUTO: 5.9 % (ref 0.3–6.2)
ERYTHROCYTE [DISTWIDTH] IN BLOOD BY AUTOMATED COUNT: 12.8 % (ref 12.3–15.4)
GFR SERPL CREATININE-BSD FRML MDRD: 103 ML/MIN/1.73
GFR SERPL CREATININE-BSD FRML MDRD: 124 ML/MIN/1.73
GLOBULIN UR ELPH-MCNC: 3 GM/DL
GLUCOSE SERPL-MCNC: 90 MG/DL (ref 65–99)
HBA1C MFR BLD: 6.22 % (ref 4.8–5.6)
HCT VFR BLD AUTO: 40.1 % (ref 34–46.6)
HDLC SERPL-MCNC: 45 MG/DL (ref 40–60)
HGB BLD-MCNC: 13.7 G/DL (ref 12–15.9)
IMM GRANULOCYTES # BLD AUTO: 0.02 10*3/MM3 (ref 0–0.05)
IMM GRANULOCYTES NFR BLD AUTO: 0.2 % (ref 0–0.5)
LDLC SERPL CALC-MCNC: 92 MG/DL (ref 0–100)
LDLC/HDLC SERPL: 2.05 {RATIO}
LYMPHOCYTES # BLD AUTO: 3.09 10*3/MM3 (ref 0.7–3.1)
LYMPHOCYTES NFR BLD AUTO: 37.9 % (ref 19.6–45.3)
MCH RBC QN AUTO: 31 PG (ref 26.6–33)
MCHC RBC AUTO-ENTMCNC: 34.2 G/DL (ref 31.5–35.7)
MCV RBC AUTO: 90.7 FL (ref 79–97)
MONOCYTES # BLD AUTO: 0.36 10*3/MM3 (ref 0.1–0.9)
MONOCYTES NFR BLD AUTO: 4.4 % (ref 5–12)
NEUTROPHILS NFR BLD AUTO: 4.12 10*3/MM3 (ref 1.7–7)
NEUTROPHILS NFR BLD AUTO: 50.6 % (ref 42.7–76)
NRBC BLD AUTO-RTO: 0 /100 WBC (ref 0–0.2)
PLATELET # BLD AUTO: 263 10*3/MM3 (ref 140–450)
PMV BLD AUTO: 10.7 FL (ref 6–12)
POTASSIUM SERPL-SCNC: 4.4 MMOL/L (ref 3.5–5.2)
PROT SERPL-MCNC: 7.2 G/DL (ref 6–8.5)
RBC # BLD AUTO: 4.42 10*6/MM3 (ref 3.77–5.28)
SODIUM SERPL-SCNC: 141 MMOL/L (ref 136–145)
TRIGL SERPL-MCNC: 144 MG/DL (ref 0–150)
VLDLC SERPL-MCNC: 28.8 MG/DL (ref 5–40)
WBC # BLD AUTO: 8.15 10*3/MM3 (ref 3.4–10.8)

## 2020-09-29 PROCEDURE — 80061 LIPID PANEL: CPT | Performed by: FAMILY MEDICINE

## 2020-09-29 PROCEDURE — 83036 HEMOGLOBIN GLYCOSYLATED A1C: CPT | Performed by: FAMILY MEDICINE

## 2020-09-29 PROCEDURE — G0439 PPPS, SUBSEQ VISIT: HCPCS | Performed by: FAMILY MEDICINE

## 2020-09-29 PROCEDURE — 80053 COMPREHEN METABOLIC PANEL: CPT | Performed by: FAMILY MEDICINE

## 2020-09-29 PROCEDURE — 85025 COMPLETE CBC W/AUTO DIFF WBC: CPT | Performed by: FAMILY MEDICINE
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
## 2020-09-29 NOTE — ASSESSMENT & PLAN NOTE
The patient is here for health maintenance visit.  Currently, the patient consumes a healthy diet and has an inadequate exercise regimen.  Screening lab work is ordered.  Immunizations were reviewed today.  Advice and education was given regarding nutrition, aerobic exercise, routine dental evaluations, routine eye exams, reproductive health, cardiovascular risk reduction, sunscreen use, self skin examination (annual dermatology evaluations) and seatbelt use (general overall safety).  Further recommendations will be given if needed after lab evaluation.  Annual wellness evaluation is recommended.

## 2020-09-29 NOTE — PATIENT INSTRUCTIONS
Advance Care Planning and Advance Directives     You make decisions on a daily basis - decisions about where you want to live, your career, your home, your life. Perhaps one of the most important decisions you face is your choice for future medical care. Take time to talk with your family and your healthcare team and start planning today.  Advance Care Planning is a process that can help you:  · Understand possible future healthcare decisions in light of your own experiences  · Reflect on those decision in light of your goals and values  · Discuss your decisions with those closest to you and the healthcare professionals that care for you  · Make a plan by creating a document that reflects your wishes    Surrogate Decision Maker  In the event of a medical emergency, which has left you unable to communicate or to make your own decisions, you would need someone to make decisions for you.  It is important to discuss your preferences for medical treatment with this person while you are in good health.     Qualities of a surrogate decision maker:  • Willing to take on this role and responsibility  • Knows what you want for future medical care  • Willing to follow your wishes even if they don't agree with them  • Able to make difficult medical decisions under stressful circumstances    Advance Directives  These are legal documents you can create that will guide your healthcare team and decision maker(s) when needed. These documents can be stored in the electronic medical record.    · Living Will - a legal document to guide your care if you have a terminal condition or a serious illness and are unable to communicate. States vary by statute in document names/types, but most forms may include one or more of the following:        -  Directions regarding life-prolonging treatments        -  Directions regarding artificially provided nutrition/hydration        -  Choosing a healthcare decision maker        -  Direction  regarding organ/tissue donation    · Durable Power of  for Healthcare - this document names an -in-fact to make medical decisions for you, but it may also allow this person to make personal and financial decisions for you. Please seek the advice of an  if you need this type of document.    **Advance Directives are not required and no one may discriminate against you if you do not sign one.    Medical Orders  Many states allow specific forms/orders signed by your physician to record your wishes for medical treatment in your current state of health. This form, signed in personal communication with your physician, addresses resuscitation and other medical interventions that you may or may not want.      For more information or to schedule a time with a [facility] Advance Care Planning Facilitator contact: [facility] HospitalGrid[facility]/ACP or call [phone] and someone will contact you directly.    Advance Directive    Advance directives are legal documents that let you make choices ahead of time about your health care and medical treatment in case you become unable to communicate for yourself. Advance directives are a way for you to communicate your wishes to family, friends, and health care providers. This can help convey your decisions about end-of-life care if you become unable to communicate.  Discussing and writing advance directives should happen over time rather than all at once. Advance directives can be changed depending on your situation and what you want, even after you have signed the advance directives.  If you do not have an advance directive, some states assign family decision makers to act on your behalf based on how closely you are related to them. Each state has its own laws regarding advance directives. You may want to check with your health care provider, , or state representative about the laws in your state. There are different types of advance directives, such  as:  · Medical power of .  · Living will.  · Do not resuscitate (DNR) or do not attempt resuscitation (DNAR) order.  Health care proxy and medical power of   A health care proxy, also called a health care agent, is a person who is appointed to make medical decisions for you in cases in which you are unable to make the decisions yourself. Generally, people choose someone they know well and trust to represent their preferences. Make sure to ask this person for an agreement to act as your proxy. A proxy may have to exercise judgment in the event of a medical decision for which your wishes are not known.  A medical power of  is a legal document that names your health care proxy. Depending on the laws in your state, after the document is written, it may also need to be:  · Signed.  · Notarized.  · Dated.  · Copied.  · Witnessed.  · Incorporated into your medical record.  You may also want to appoint someone to manage your financial affairs in a situation in which you are unable to do so. This is called a durable power of  for finances. It is a separate legal document from the durable power of  for health care. You may choose the same person or someone different from your health care proxy to act as your agent in financial matters.  If you do not appoint a proxy, or if there is a concern that the proxy is not acting in your best interests, a court-appointed guardian may be designated to act on your behalf.  Living will  A living will is a set of instructions documenting your wishes about medical care when you cannot express them yourself. Health care providers should keep a copy of your living will in your medical record. You may want to give a copy to family members or friends. To alert caregivers in case of an emergency, you can place a card in your wallet to let them know that you have a living will and where they can find it. A living will is used if you become:  · Terminally  ill.  · Incapacitated.  · Unable to communicate or make decisions.  Items to consider in your living will include:  · The use or non-use of life-sustaining equipment, such as dialysis machines and breathing machines (ventilators).  · A DNR or DNAR order, which is the instruction not to use cardiopulmonary resuscitation (CPR) if breathing or heartbeat stops.  · The use or non-use of tube feeding.  · Withholding of food and fluids.  · Comfort (palliative) care when the goal becomes comfort rather than a cure.  · Organ and tissue donation.  A living will does not give instructions for distributing your money and property if you should pass away. It is recommended that you seek the advice of a  when writing a will. Decisions about taxes, beneficiaries, and asset distribution will be legally binding. This process can relieve your family and friends of any concerns surrounding disputes or questions that may come up about the distribution of your assets.  DNR or DNAR  A DNR or DNAR order is a request not to have CPR in the event that your heart stops beating or you stop breathing. If a DNR or DNAR order has not been made and shared, a health care provider will try to help any patient whose heart has stopped or who has stopped breathing. If you plan to have surgery, talk with your health care provider about how your DNR or DNAR order will be followed if problems occur.  Summary  · Advance directives are the legal documents that allow you to make choices ahead of time about your health care and medical treatment in case you become unable to communicate for yourself.  · The process of discussing and writing advance directives should happen over time. You can change the advance directives, even after you have signed them.  · Advance directives include DNR or DNAR orders, living barrett, and designating an agent as your medical power of .  This information is not intended to replace advice given to you by your  health care provider. Make sure you discuss any questions you have with your health care provider.  Document Released: 03/26/2009 Document Revised: 01/22/2020 Document Reviewed: 11/06/2017  Elsevier Patient Education © 2020 [employer]

## 2020-10-30 DIAGNOSIS — I10 ESSENTIAL HYPERTENSION: ICD-10-CM

## 2020-11-03 DIAGNOSIS — K21.00 CHRONIC REFLUX ESOPHAGITIS: ICD-10-CM

## 2020-11-03 RX ORDER — PANTOPRAZOLE SODIUM 40 MG/1
TABLET, DELAYED RELEASE ORAL
Qty: 90 TABLET | Refills: 0 | Status: SHIPPED | OUTPATIENT
Start: 2020-11-03 | End: 2021-02-02

## 2020-12-03 DIAGNOSIS — R01.1 MURMUR, HEART: Primary | ICD-10-CM

## 2021-01-18 RX ORDER — ESCITALOPRAM OXALATE 10 MG/1
TABLET ORAL
Qty: 90 TABLET | Refills: 3 | OUTPATIENT
Start: 2021-01-18

## 2021-01-31 DIAGNOSIS — K21.00 CHRONIC REFLUX ESOPHAGITIS: ICD-10-CM

## 2021-02-02 RX ORDER — PANTOPRAZOLE SODIUM 40 MG/1
TABLET, DELAYED RELEASE ORAL
Qty: 90 TABLET | Refills: 0 | Status: SHIPPED | OUTPATIENT
Start: 2021-02-02 | End: 2021-04-28

## 2021-02-03 ENCOUNTER — OFFICE VISIT (OUTPATIENT)
Dept: FAMILY MEDICINE CLINIC | Facility: CLINIC | Age: 61
End: 2021-02-03

## 2021-02-03 VITALS
HEIGHT: 62 IN | BODY MASS INDEX: 41.22 KG/M2 | RESPIRATION RATE: 18 BRPM | SYSTOLIC BLOOD PRESSURE: 140 MMHG | TEMPERATURE: 96.8 F | WEIGHT: 224 LBS | DIASTOLIC BLOOD PRESSURE: 92 MMHG | OXYGEN SATURATION: 98 % | HEART RATE: 88 BPM

## 2021-02-03 DIAGNOSIS — R30.9 URINATION PAIN: Primary | ICD-10-CM

## 2021-02-03 DIAGNOSIS — R10.9 FLANK PAIN: ICD-10-CM

## 2021-02-03 DIAGNOSIS — R10.32 LLQ PAIN: ICD-10-CM

## 2021-02-03 DIAGNOSIS — R31.29 HEMATURIA, MICROSCOPIC: ICD-10-CM

## 2021-02-03 LAB
BILIRUB BLD-MCNC: NEGATIVE MG/DL
CLARITY, POC: CLEAR
COLOR UR: YELLOW
EXPIRATION DATE: ABNORMAL
GLUCOSE UR STRIP-MCNC: NEGATIVE MG/DL
KETONES UR QL: NEGATIVE
LEUKOCYTE EST, POC: NEGATIVE
Lab: ABNORMAL
NITRITE UR-MCNC: NEGATIVE MG/ML
PH UR: 6 [PH] (ref 5–8)
PROT UR STRIP-MCNC: ABNORMAL MG/DL
RBC # UR STRIP: ABNORMAL /UL
SP GR UR: 1.02 (ref 1–1.03)
UROBILINOGEN UR QL: NORMAL

## 2021-02-03 PROCEDURE — 81003 URINALYSIS AUTO W/O SCOPE: CPT | Performed by: NURSE PRACTITIONER

## 2021-02-03 PROCEDURE — 99213 OFFICE O/P EST LOW 20 MIN: CPT | Performed by: NURSE PRACTITIONER

## 2021-02-03 NOTE — PROGRESS NOTES
"Chief Complaint  Nausea and Flank Pain (pain)    Subjective          Dee Grider presents to [facility] for   History of Present Illness   Last night had a dull pain in left flank. Today the pain got worse and now is in LLQ. Nausea and vomiting x2. Can't sit still. Known history of renal stones. Making small amounts of urine.    Objective   Vital Signs:   /92   Pulse 88   Temp 96.8 °F (36 °C)   Resp 18   Ht 157.5 cm (62\")   Wt 102 kg (224 lb)   SpO2 98%   BMI 40.97 kg/m²     Physical Exam  Vitals signs and nursing note reviewed.   Constitutional:       General: She is in acute distress.      Appearance: She is well-developed. She is diaphoretic. She is not ill-appearing or toxic-appearing.      Comments: Cannot sit still, diaphoretic and vomiting in office.   HENT:      Head: Normocephalic and atraumatic.   Eyes:      General: No scleral icterus.        Right eye: No discharge.         Left eye: No discharge.      Conjunctiva/sclera: Conjunctivae normal.   Neck:      Musculoskeletal: Neck supple.      Thyroid: No thyromegaly.      Trachea: No tracheal deviation.   Cardiovascular:      Rate and Rhythm: Regular rhythm. Tachycardia present.      Heart sounds: Normal heart sounds. No murmur. No friction rub. No gallop.    Pulmonary:      Effort: Pulmonary effort is normal. No respiratory distress.      Breath sounds: Normal breath sounds. No wheezing.   Abdominal:      General: Bowel sounds are normal. There is no distension.      Palpations: Abdomen is soft. There is no mass.      Tenderness: There is abdominal tenderness.   Musculoskeletal:         General: No deformity.   Lymphadenopathy:      Cervical: No cervical adenopathy.   Skin:     General: Skin is warm.      Capillary Refill: Capillary refill takes less than 2 seconds.      Findings: No erythema or rash.   Neurological:      Mental Status: She is alert and oriented to person, place, and time.      " Coordination: Coordination normal.   Psychiatric:         Mood and Affect: Mood normal.         Speech: Speech normal.         Behavior: Behavior normal.         Thought Content: Thought content normal.         Judgment: Judgment normal.        Result Review :            CT Abdomen Pelvis Stone Protocol (08/26/2020 13:28)  POCT urinalysis dipstick, automated (02/03/2021 15:13)       Assessment and Plan    Problem List Items Addressed This Visit     None      Visit Diagnoses     Urination pain    -  Primary    Relevant Orders    POCT urinalysis dipstick, automated (Completed)    Flank pain        Hematuria, microscopic        LLQ pain          Likely stone. Patient called her niece who took her to the ER at [facility].    Follow Up   Return if symptoms worsen or fail to improve.  Patient was given instructions and counseling regarding her condition or for health maintenance advice. Please see specific information pulled into the AVS if appropriate.

## 2021-02-04 ENCOUNTER — TELEPHONE (OUTPATIENT)
Dept: FAMILY MEDICINE CLINIC | Facility: CLINIC | Age: 61
End: 2021-02-04

## 2021-02-04 ENCOUNTER — EPISODE CHANGES (OUTPATIENT)
Dept: CASE MANAGEMENT | Facility: OTHER | Age: 61
End: 2021-02-04

## 2021-02-04 NOTE — TELEPHONE ENCOUNTER
I called to check on her. Schedule for surgery 2/15 for stone extraction by Dr Tripathi. She is pain free.

## 2021-02-05 ENCOUNTER — OFFICE VISIT (OUTPATIENT)
Dept: CARDIOLOGY | Facility: CLINIC | Age: 61
End: 2021-02-05

## 2021-02-05 ENCOUNTER — HOSPITAL ENCOUNTER (OUTPATIENT)
Dept: CARDIOLOGY | Facility: HOSPITAL | Age: 61
Discharge: HOME | End: 2021-02-05
Admitting: INTERNAL MEDICINE

## 2021-02-05 VITALS — BODY MASS INDEX: 41.22 KG/M2 | HEIGHT: 62 IN | WEIGHT: 224 LBS

## 2021-02-05 VITALS
BODY MASS INDEX: 41.22 KG/M2 | SYSTOLIC BLOOD PRESSURE: 102 MMHG | HEIGHT: 62 IN | HEART RATE: 74 BPM | WEIGHT: 224 LBS | TEMPERATURE: 96.8 F | DIASTOLIC BLOOD PRESSURE: 66 MMHG | OXYGEN SATURATION: 94 %

## 2021-02-05 DIAGNOSIS — I10 ESSENTIAL HYPERTENSION: ICD-10-CM

## 2021-02-05 DIAGNOSIS — E78.01 FAMILIAL HYPERCHOLESTEROLEMIA: ICD-10-CM

## 2021-02-05 DIAGNOSIS — R01.1 MURMUR, HEART: ICD-10-CM

## 2021-02-05 DIAGNOSIS — R01.1 MURMUR, HEART: Primary | ICD-10-CM

## 2021-02-05 LAB
BH CV ECHO MEAS - AI DEC SLOPE: 226.2 CM/SEC^2
BH CV ECHO MEAS - AI MAX PG: 72.8 MMHG
BH CV ECHO MEAS - AI MAX VEL: 426.5 CM/SEC
BH CV ECHO MEAS - AI P1/2T: 552.2 MSEC
BH CV ECHO MEAS - AO MAX PG (FULL): 13.7 MMHG
BH CV ECHO MEAS - AO MAX PG: 19.5 MMHG
BH CV ECHO MEAS - AO MEAN PG (FULL): 6.8 MMHG
BH CV ECHO MEAS - AO MEAN PG: 9.5 MMHG
BH CV ECHO MEAS - AO ROOT AREA (BSA CORRECTED): 1.6
BH CV ECHO MEAS - AO ROOT AREA: 8.1 CM^2
BH CV ECHO MEAS - AO ROOT DIAM: 3.2 CM
BH CV ECHO MEAS - AO V2 MAX: 221 CM/SEC
BH CV ECHO MEAS - AO V2 MEAN: 140.6 CM/SEC
BH CV ECHO MEAS - AO V2 VTI: 44.6 CM
BH CV ECHO MEAS - ASC AORTA: 3.4 CM
BH CV ECHO MEAS - AVA(I,A): 1.9 CM^2
BH CV ECHO MEAS - AVA(I,D): 1.9 CM^2
BH CV ECHO MEAS - AVA(V,A): 1.9 CM^2
BH CV ECHO MEAS - AVA(V,D): 1.9 CM^2
BH CV ECHO MEAS - BSA(HAYCOCK): 2.2 M^2
BH CV ECHO MEAS - BSA: 2 M^2
BH CV ECHO MEAS - BZI_BMI: 41 KILOGRAMS/M^2
BH CV ECHO MEAS - BZI_METRIC_HEIGHT: 157.5 CM
BH CV ECHO MEAS - BZI_METRIC_WEIGHT: 101.6 KG
BH CV ECHO MEAS - EDV(CUBED): 79 ML
BH CV ECHO MEAS - EDV(MOD-SP2): 84 ML
BH CV ECHO MEAS - EDV(MOD-SP4): 97 ML
BH CV ECHO MEAS - EDV(TEICH): 82.6 ML
BH CV ECHO MEAS - EF(CUBED): 73.3 %
BH CV ECHO MEAS - EF(MOD-BP): 65 %
BH CV ECHO MEAS - EF(MOD-SP2): 65.5 %
BH CV ECHO MEAS - EF(MOD-SP4): 64.9 %
BH CV ECHO MEAS - EF(TEICH): 65.4 %
BH CV ECHO MEAS - ESV(CUBED): 21.1 ML
BH CV ECHO MEAS - ESV(MOD-SP2): 29 ML
BH CV ECHO MEAS - ESV(MOD-SP4): 34 ML
BH CV ECHO MEAS - ESV(TEICH): 28.6 ML
BH CV ECHO MEAS - FS: 35.6 %
BH CV ECHO MEAS - IVS/LVPW: 1.2
BH CV ECHO MEAS - IVSD: 1.1 CM
BH CV ECHO MEAS - LA DIMENSION: 3.5 CM
BH CV ECHO MEAS - LA/AO: 1.1
BH CV ECHO MEAS - LAD MAJOR: 4.7 CM
BH CV ECHO MEAS - LAT PEAK E' VEL: 8.7 CM/SEC
BH CV ECHO MEAS - LATERAL E/E' RATIO: 9.2
BH CV ECHO MEAS - LV DIASTOLIC VOL/BSA (35-75): 48.4 ML/M^2
BH CV ECHO MEAS - LV IVRT: 0.06 SEC
BH CV ECHO MEAS - LV MASS(C)D: 147.5 GRAMS
BH CV ECHO MEAS - LV MASS(C)DI: 73.5 GRAMS/M^2
BH CV ECHO MEAS - LV MAX PG: 5.8 MMHG
BH CV ECHO MEAS - LV MEAN PG: 2.8 MMHG
BH CV ECHO MEAS - LV SYSTOLIC VOL/BSA (12-30): 17 ML/M^2
BH CV ECHO MEAS - LV V1 MAX: 120.7 CM/SEC
BH CV ECHO MEAS - LV V1 MEAN: 72.7 CM/SEC
BH CV ECHO MEAS - LV V1 VTI: 24.3 CM
BH CV ECHO MEAS - LVIDD: 4.3 CM
BH CV ECHO MEAS - LVIDS: 2.8 CM
BH CV ECHO MEAS - LVLD AP2: 7.5 CM
BH CV ECHO MEAS - LVLD AP4: 8.1 CM
BH CV ECHO MEAS - LVLS AP2: 5.8 CM
BH CV ECHO MEAS - LVLS AP4: 6.7 CM
BH CV ECHO MEAS - LVOT AREA (M): 3.5 CM^2
BH CV ECHO MEAS - LVOT AREA: 3.4 CM^2
BH CV ECHO MEAS - LVOT DIAM: 2.1 CM
BH CV ECHO MEAS - LVPWD: 0.95 CM
BH CV ECHO MEAS - MED PEAK E' VEL: 6.7 CM/SEC
BH CV ECHO MEAS - MEDIAL E/E' RATIO: 11.9
BH CV ECHO MEAS - MV A MAX VEL: 109.8 CM/SEC
BH CV ECHO MEAS - MV DEC SLOPE: 376.7 CM/SEC^2
BH CV ECHO MEAS - MV DEC TIME: 0.24 SEC
BH CV ECHO MEAS - MV E MAX VEL: 81.4 CM/SEC
BH CV ECHO MEAS - MV E/A: 0.74
BH CV ECHO MEAS - MV P1/2T MAX VEL: 122.2 CM/SEC
BH CV ECHO MEAS - MV P1/2T: 95 MSEC
BH CV ECHO MEAS - MVA P1/2T LCG: 1.8 CM^2
BH CV ECHO MEAS - MVA(P1/2T): 2.3 CM^2
BH CV ECHO MEAS - PA ACC SLOPE: 1065 CM/SEC^2
BH CV ECHO MEAS - PA ACC TIME: 0.09 SEC
BH CV ECHO MEAS - PA MAX PG: 5.3 MMHG
BH CV ECHO MEAS - PA PR(ACCEL): 37.8 MMHG
BH CV ECHO MEAS - PA V2 MAX: 115.3 CM/SEC
BH CV ECHO MEAS - RAP SYSTOLE: 3 MMHG
BH CV ECHO MEAS - RVSP: 28 MMHG
BH CV ECHO MEAS - SI(AO): 178.9 ML/M^2
BH CV ECHO MEAS - SI(CUBED): 28.9 ML/M^2
BH CV ECHO MEAS - SI(LVOT): 41.7 ML/M^2
BH CV ECHO MEAS - SI(MOD-SP2): 27.4 ML/M^2
BH CV ECHO MEAS - SI(MOD-SP4): 31.4 ML/M^2
BH CV ECHO MEAS - SI(TEICH): 26.9 ML/M^2
BH CV ECHO MEAS - SV(AO): 358.7 ML
BH CV ECHO MEAS - SV(CUBED): 57.9 ML
BH CV ECHO MEAS - SV(LVOT): 83.7 ML
BH CV ECHO MEAS - SV(MOD-SP2): 55 ML
BH CV ECHO MEAS - SV(MOD-SP4): 63 ML
BH CV ECHO MEAS - SV(TEICH): 54 ML
BH CV ECHO MEAS - TAPSE (>1.6): 2.2 CM
BH CV ECHO MEAS - TR MAX PG: 25 MMHG
BH CV ECHO MEAS - TR MAX VEL: 246.1 CM/SEC
BH CV ECHO MEASUREMENTS AVERAGE E/E' RATIO: 10.57
BH CV XLRA - RV BASE: 3.3 CM
BH CV XLRA - RV LENGTH: 6.9 CM
BH CV XLRA - RV MID: 2.8 CM
BH CV XLRA - TDI S': 15.8 CM/SEC
LEFT ATRIUM VOLUME INDEX: 17.9 ML/M^2
LEFT ATRIUM VOLUME: 36 ML
LV EF 2D ECHO EST: 60 %
MAXIMAL PREDICTED HEART RATE: 162 BPM
STRESS TARGET HR: 138 BPM

## 2021-02-05 PROCEDURE — 99214 OFFICE O/P EST MOD 30 MIN: CPT | Performed by: INTERNAL MEDICINE

## 2021-02-05 PROCEDURE — 93306 TTE W/DOPPLER COMPLETE: CPT | Performed by: INTERNAL MEDICINE

## 2021-02-05 PROCEDURE — 93306 TTE W/DOPPLER COMPLETE: CPT

## 2021-02-05 RX ORDER — KETOROLAC TROMETHAMINE 10 MG/1
10 TABLET, FILM COATED ORAL EVERY 6 HOURS PRN
COMMUNITY
End: 2021-10-06

## 2021-02-05 RX ORDER — ONDANSETRON 4 MG/1
4 TABLET, FILM COATED ORAL EVERY 8 HOURS PRN
COMMUNITY
End: 2021-10-06

## 2021-02-05 RX ORDER — CEPHALEXIN 500 MG/1
500 CAPSULE ORAL 2 TIMES DAILY
COMMUNITY
End: 2021-10-06

## 2021-02-15 ENCOUNTER — PATIENT OUTREACH (OUTPATIENT)
Dept: CASE MANAGEMENT | Facility: OTHER | Age: 61
End: 2021-02-15

## 2021-02-15 NOTE — OUTREACH NOTE
Care Coordination Note    Unable to reach patient after numerous attempts since ED visit at [facility] on 2-3-21 with flank and lower abdominal pain, nausea & vomiting.  Have utilized [email or web address] 's system, and left voicemail with RN-ACM contact information if needed.  This note will be routed to PCP for informational purposes.    Rachelle Walsh RN  Ambulatory     2/15/2021, 16:09 EST

## 2021-03-12 ENCOUNTER — HOSPITAL ENCOUNTER (OUTPATIENT)
Dept: GENERAL RADIOLOGY | Facility: HOSPITAL | Age: 61
Discharge: HOME | End: 2021-03-12

## 2021-03-12 ENCOUNTER — OFFICE VISIT (OUTPATIENT)
Dept: FAMILY MEDICINE CLINIC | Facility: CLINIC | Age: 61
End: 2021-03-12

## 2021-03-12 VITALS
WEIGHT: 228 LBS | OXYGEN SATURATION: 99 % | HEIGHT: 62 IN | DIASTOLIC BLOOD PRESSURE: 80 MMHG | TEMPERATURE: 97.9 F | RESPIRATION RATE: 20 BRPM | BODY MASS INDEX: 41.96 KG/M2 | HEART RATE: 77 BPM | SYSTOLIC BLOOD PRESSURE: 124 MMHG
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
## 2021-03-12 PROCEDURE — 73562 X-RAY EXAM OF KNEE 3: CPT

## 2021-03-12 PROCEDURE — 99213 OFFICE O/P EST LOW 20 MIN: CPT | Performed by: NURSE PRACTITIONER

## 2021-03-12 NOTE — PATIENT INSTRUCTIONS
Acute Knee Pain, Adult  Many things can cause knee pain. Sometimes, knee pain is sudden (acute) and may be caused by damage, swelling, or irritation of the muscles and tissues that support your knee.  The pain often goes away on its own with time and rest. If the pain does not go away, tests may be done to find out what is causing the pain.  Follow these instructions at home:  Pay attention to any changes in your symptoms. Take these actions to relieve your pain.  If you have a knee sleeve or brace:    · Wear the sleeve or brace as told by your doctor. Remove it only as told by your doctor.  · Loosen the sleeve or brace if your toes:  ? Tingle.  ? Become numb.  ? Turn cold and blue.  · Keep the sleeve or brace clean.  · If the sleeve or brace is not waterproof:  ? Do not let it get wet.  ? Cover it with a watertight covering when you take a bath or shower.  Activity  · Rest your knee.  · Do not do things that cause pain.  · Avoid activities where both feet leave the ground at the same time (high-impact activities). Examples are running, jumping rope, and doing jumping jacks.  · Work with a physical therapist to make a safe exercise program, as told by your doctor.  Managing pain, stiffness, and swelling    · If told, put ice on the knee:  ? Put ice in a plastic bag.  ? Place a towel between your skin and the bag.  ? Leave the ice on for 20 minutes, 2-3 times a day.  · If told, put pressure (compression) on your injured knee to control swelling, give support, and help with discomfort. Compression may be done with an elastic bandage.  General instructions  · Take all medicines only as told by your doctor.  · Raise (elevate) your knee while you are sitting or lying down. Make sure your knee is higher than your heart.  · Sleep with a pillow under your knee.  · Do not use any products that contain nicotine or tobacco. These include cigarettes, e-cigarettes, and chewing tobacco. These products may slow down healing. If  you need help quitting, ask your doctor.  · If you are overweight, work with your doctor and a food expert (dietitian) to set goals to lose weight. Being overweight can make your knee hurt more.  · Keep all follow-up visits as told by your doctor. This is important.  Contact a doctor if:  · The knee pain does not stop.  · The knee pain changes or gets worse.  · You have a fever along with knee pain.  · Your knee feels warm when you touch it.  · Your knee gives out or locks up.  Get help right away if:  · Your knee swells, and the swelling gets worse.  · You cannot move your knee.  · You have very bad knee pain.  Summary  · Many things can cause knee pain. The pain often goes away on its own with time and rest.  · Your doctor may do tests to find out the cause of the pain.  · Pay attention to any changes in your symptoms. Relieve your pain with rest, medicines, light activity, and use of ice.  · Get help right away if you cannot move your knee or your knee pain is very bad.  This information is not intended to replace advice given to you by your health care provider. Make sure you discuss any questions you have with your health care provider.  Document Revised: 05/30/2019 Document Reviewed: 05/30/2019  Elsevier Patient Education © 2020 [employer]

## 2021-03-13 ENCOUNTER — TELEPHONE (OUTPATIENT)
Dept: FAMILY MEDICINE CLINIC | Facility: CLINIC | Age: 61
End: 2021-03-13

## 2021-03-13 NOTE — TELEPHONE ENCOUNTER
"Called patient guarding her bilateral knee x-rays.  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   \" I would like to wait a while. They are a little better today\".     Discussed following up in the future if needed.     Zeina Martinez, APRN    "

## 2021-03-26 NOTE — PROGRESS NOTES
"Chief Complaint  Knee Pain (fell on knees, right hip pain)    Subjective          Dee Grider presents to [facility]  Patient is a 60-year-old female.  She is here for knee pain and right hip pain.  She fell a few days ago down on her knees into her right hip.  She has no abrasions.  No visible fracture.      Knee Pain   The incident occurred 3 to 5 days ago. The incident occurred at home. The injury mechanism was a fall. The pain is present in the right knee and left knee. The quality of the pain is described as aching. The pain is at a severity of 3/10. The pain is mild. The pain has been fluctuating since onset. Pertinent negatives include no inability to bear weight, loss of motion, loss of sensation, muscle weakness, numbness or tingling. She reports no foreign bodies present. The symptoms are aggravated by movement. She has tried nothing for the symptoms. The treatment provided no relief.       Objective   Vital Signs:   /80   Pulse 77   Temp 97.9 °F (36.6 °C) (Temporal)   Resp 20   Ht 157.5 cm (62.01\")   Wt 103 kg (228 lb)   SpO2 99%   BMI 41.69 kg/m²     Physical Exam  Vitals reviewed.   Cardiovascular:      Rate and Rhythm: Normal rate and regular rhythm.      Pulses: Normal pulses.      Heart sounds: Normal heart sounds.   Pulmonary:      Effort: Pulmonary effort is normal.      Breath sounds: Normal breath sounds.   Musculoskeletal:         General: Tenderness present. No swelling, deformity or signs of injury. Normal range of motion.      Right knee: No swelling, deformity or erythema. Tenderness present.      Left knee: No swelling, deformity or erythema. Tenderness present.      Right lower leg: No edema.      Left lower leg: No edema.   Skin:     General: Skin is warm and dry.      Capillary Refill: Capillary refill takes less than 2 seconds.   Neurological:      Mental Status: She is alert and oriented to person, place, and time.        Result Review " :                 Assessment and Plan    Diagnoses and all orders for this visit:    1. Injury due to fall, initial encounter (Primary)  -     XR Knee 3 View Left; Future  -     XR Knee 3 View Right; Future    will order bilateral XR to R/O fractures.   She declines any additional pain meds.   Declined ace wraps.     Follow up with PCP.     Follow Up   No follow-ups on file.  Patient was given instructions and counseling regarding her condition or for health maintenance advice. Please see specific information pulled into the AVS if appropriate.

## 2021-04-26 RX ORDER — ATORVASTATIN CALCIUM 80 MG/1
TABLET, FILM COATED ORAL
Qty: 90 TABLET | Refills: 3 | Status: SHIPPED | OUTPATIENT
Start: 2021-04-26 | End: 2022-04-20 | Stop reason: SDUPTHER

## 2021-04-28 DIAGNOSIS — K21.00 CHRONIC REFLUX ESOPHAGITIS: ICD-10-CM

## 2021-04-28 RX ORDER — PANTOPRAZOLE SODIUM 40 MG/1
TABLET, DELAYED RELEASE ORAL
Qty: 90 TABLET | Refills: 0 | Status: SHIPPED | OUTPATIENT
Start: 2021-04-28 | End: 2021-04-28 | Stop reason: SDUPTHER

## 2021-04-28 RX ORDER — PANTOPRAZOLE SODIUM 40 MG/1
40 TABLET, DELAYED RELEASE ORAL DAILY
Qty: 90 TABLET | Refills: 0 | Status: SHIPPED | OUTPATIENT
Start: 2021-04-28 | End: 2021-07-26

## 2021-04-28 NOTE — TELEPHONE ENCOUNTER
Caller: Dee Grider    Relationship: Self    Best call back number: [phone]    Medication needed:   Requested Prescriptions     Pending Prescriptions Disp Refills   • pantoprazole (PROTONIX) 40 MG EC tablet 90 tablet 0     Sig: Take 1 tablet by mouth Daily.       When do you need the refill by: TODAY    Does the patient have less than a 3 day supply:  [x] Yes  [] No    What is the patient's preferred pharmacy: [facility] #[id number] - [address] [phone] [facility] [phone]

## 2021-07-22 DIAGNOSIS — K21.00 CHRONIC REFLUX ESOPHAGITIS: ICD-10-CM

## 2021-07-26 RX ORDER — PANTOPRAZOLE SODIUM 40 MG/1
TABLET, DELAYED RELEASE ORAL
Qty: 90 TABLET | Refills: 0 | Status: SHIPPED | OUTPATIENT
Start: 2021-07-26 | End: 2021-12-22

## 2021-10-06 ENCOUNTER — OFFICE VISIT (OUTPATIENT)
Dept: FAMILY MEDICINE CLINIC | Facility: CLINIC | Age: 61
End: 2021-10-06

## 2021-10-06 VITALS
HEIGHT: 62 IN | RESPIRATION RATE: 18 BRPM | SYSTOLIC BLOOD PRESSURE: 126 MMHG | HEART RATE: 91 BPM | DIASTOLIC BLOOD PRESSURE: 84 MMHG | TEMPERATURE: 98.6 F | BODY MASS INDEX: 42.33 KG/M2 | OXYGEN SATURATION: 98 % | WEIGHT: 230 LBS

## 2021-10-06 DIAGNOSIS — M25.512 CHRONIC LEFT SHOULDER PAIN: ICD-10-CM

## 2021-10-06 DIAGNOSIS — Z00.00 MEDICARE ANNUAL WELLNESS VISIT, SUBSEQUENT: Primary | ICD-10-CM

## 2021-10-06 DIAGNOSIS — R73.03 PREDIABETES: ICD-10-CM

## 2021-10-06 DIAGNOSIS — M54.6 CHRONIC LEFT-SIDED THORACIC BACK PAIN: ICD-10-CM

## 2021-10-06 DIAGNOSIS — E78.01 FAMILIAL HYPERCHOLESTEROLEMIA: ICD-10-CM

## 2021-10-06 DIAGNOSIS — G89.29 CHRONIC LEFT SHOULDER PAIN: ICD-10-CM

## 2021-10-06 DIAGNOSIS — R61 DIAPHORESIS: ICD-10-CM

## 2021-10-06 DIAGNOSIS — Z12.83 SKIN CANCER SCREENING: ICD-10-CM

## 2021-10-06 DIAGNOSIS — G43.109 MIGRAINE WITH AURA AND WITHOUT STATUS MIGRAINOSUS, NOT INTRACTABLE: ICD-10-CM

## 2021-10-06 DIAGNOSIS — M54.6 ACUTE LEFT-SIDED THORACIC BACK PAIN: ICD-10-CM

## 2021-10-06 DIAGNOSIS — G89.29 CHRONIC LEFT-SIDED THORACIC BACK PAIN: ICD-10-CM

## 2021-10-06 DIAGNOSIS — J30.2 SEASONAL ALLERGIES: ICD-10-CM

## 2021-10-06 DIAGNOSIS — L72.3 SEBACEOUS CYST: ICD-10-CM

## 2021-10-06 PROBLEM — N39.0 ACUTE UTI (URINARY TRACT INFECTION): Status: ACTIVE | Noted: 2021-02-03

## 2021-10-06 PROBLEM — N39.0 ACUTE UTI (URINARY TRACT INFECTION): Status: RESOLVED | Noted: 2021-02-03 | Resolved: 2021-10-06

## 2021-10-06 LAB
BASOPHILS # BLD AUTO: 0.07 10*3/MM3 (ref 0–0.2)
BASOPHILS NFR BLD AUTO: 0.8 % (ref 0–1.5)
DEPRECATED RDW RBC AUTO: 44.6 FL (ref 37–54)
EOSINOPHIL # BLD AUTO: 0.45 10*3/MM3 (ref 0–0.4)
EOSINOPHIL NFR BLD AUTO: 5 % (ref 0.3–6.2)
ERYTHROCYTE [DISTWIDTH] IN BLOOD BY AUTOMATED COUNT: 13.5 % (ref 12.3–15.4)
HBA1C MFR BLD: 6.1 % (ref 4.8–5.6)
HCT VFR BLD AUTO: 40.6 % (ref 34–46.6)
HGB BLD-MCNC: 13.6 G/DL (ref 12–15.9)
IMM GRANULOCYTES # BLD AUTO: 0.03 10*3/MM3 (ref 0–0.05)
IMM GRANULOCYTES NFR BLD AUTO: 0.3 % (ref 0–0.5)
LYMPHOCYTES # BLD AUTO: 3.42 10*3/MM3 (ref 0.7–3.1)
LYMPHOCYTES NFR BLD AUTO: 37.8 % (ref 19.6–45.3)
MCH RBC QN AUTO: 30.3 PG (ref 26.6–33)
MCHC RBC AUTO-ENTMCNC: 33.5 G/DL (ref 31.5–35.7)
MCV RBC AUTO: 90.4 FL (ref 79–97)
MONOCYTES # BLD AUTO: 0.38 10*3/MM3 (ref 0.1–0.9)
MONOCYTES NFR BLD AUTO: 4.2 % (ref 5–12)
NEUTROPHILS NFR BLD AUTO: 4.7 10*3/MM3 (ref 1.7–7)
NEUTROPHILS NFR BLD AUTO: 51.9 % (ref 42.7–76)
NRBC BLD AUTO-RTO: 0 /100 WBC (ref 0–0.2)
PLATELET # BLD AUTO: 292 10*3/MM3 (ref 140–450)
PMV BLD AUTO: 10.3 FL (ref 6–12)
RBC # BLD AUTO: 4.49 10*6/MM3 (ref 3.77–5.28)
WBC # BLD AUTO: 9.05 10*3/MM3 (ref 3.4–10.8)

## 2021-10-06 PROCEDURE — 99214 OFFICE O/P EST MOD 30 MIN: CPT | Performed by: FAMILY MEDICINE

## 2021-10-06 PROCEDURE — 85025 COMPLETE CBC W/AUTO DIFF WBC: CPT | Performed by: FAMILY MEDICINE

## 2021-10-06 PROCEDURE — G0439 PPPS, SUBSEQ VISIT: HCPCS | Performed by: FAMILY MEDICINE

## 2021-10-06 PROCEDURE — 84443 ASSAY THYROID STIM HORMONE: CPT | Performed by: FAMILY MEDICINE

## 2021-10-06 PROCEDURE — 80061 LIPID PANEL: CPT | Performed by: FAMILY MEDICINE

## 2021-10-06 PROCEDURE — 83036 HEMOGLOBIN GLYCOSYLATED A1C: CPT | Performed by: FAMILY MEDICINE

## 2021-10-06 PROCEDURE — 80053 COMPREHEN METABOLIC PANEL: CPT | Performed by: FAMILY MEDICINE

## 2021-10-06 RX ORDER — FLUTICASONE PROPIONATE 50 MCG
2 SPRAY, SUSPENSION (ML) NASAL DAILY
Qty: 18.2 ML | Refills: 3 | Status: SHIPPED | OUTPATIENT
Start: 2021-10-06 | End: 2022-10-21 | Stop reason: SINTOL

## 2021-10-06 RX ORDER — RIMEGEPANT SULFATE 75 MG/75MG
1 TABLET, ORALLY DISINTEGRATING ORAL ONCE AS NEEDED
Qty: 30 TABLET | Refills: 0 | COMMUNITY
Start: 2021-10-06 | End: 2021-12-28

## 2021-10-06 RX ORDER — ACETAMINOPHEN 500 MG
500 TABLET ORAL EVERY 6 HOURS PRN
COMMUNITY

## 2021-10-06 NOTE — ASSESSMENT & PLAN NOTE
Patient will continue to take Zyrtec daily and was instructed to take Flonase daily as well to help improve symptoms.

## 2021-10-06 NOTE — ASSESSMENT & PLAN NOTE
Acute flare of chronic back pain.  Also affecting shoulder which is acute on chronic pain.  Patient was given referral to a different physical therapist to try to get relief of symptoms.  Patient can take ibuprofen and Tylenol as needed for pain.

## 2021-10-06 NOTE — ASSESSMENT & PLAN NOTE
Headaches are worsening.  Medication changes per orders. Will contact her neurologist to see if she can get in to bee seen sooner. In the mean time she will try nurtec as needed for migraines.

## 2021-10-07 ENCOUNTER — TELEPHONE (OUTPATIENT)
Dept: FAMILY MEDICINE CLINIC | Facility: CLINIC | Age: 61
End: 2021-10-07

## 2021-10-07 LAB
ALBUMIN SERPL-MCNC: 4.6 G/DL (ref 3.5–5.2)
ALBUMIN/GLOB SERPL: 1.7 G/DL
ALP SERPL-CCNC: 105 U/L (ref 39–117)
ALT SERPL W P-5'-P-CCNC: 20 U/L (ref 1–33)
ANION GAP SERPL CALCULATED.3IONS-SCNC: 10.9 MMOL/L (ref 5–15)
AST SERPL-CCNC: 17 U/L (ref 1–32)
BILIRUB SERPL-MCNC: 0.4 MG/DL (ref 0–1.2)
BUN SERPL-MCNC: 9 MG/DL (ref 6–20)
BUN/CREAT SERPL: 14.8 (ref 7–25)
CALCIUM SPEC-SCNC: 9.8 MG/DL (ref 8.6–10.5)
CHLORIDE SERPL-SCNC: 101 MMOL/L (ref 98–107)
CHOLEST SERPL-MCNC: 187 MG/DL (ref 0–200)
CO2 SERPL-SCNC: 28.1 MMOL/L (ref 22–29)
CREAT SERPL-MCNC: 0.61 MG/DL (ref 0.57–1)
GFR SERPL CREATININE-BSD FRML MDRD: 100 ML/MIN/1.73
GFR SERPL CREATININE-BSD FRML MDRD: 122 ML/MIN/1.73
GLOBULIN UR ELPH-MCNC: 2.7 GM/DL
GLUCOSE SERPL-MCNC: 92 MG/DL (ref 65–99)
HDLC SERPL-MCNC: 43 MG/DL (ref 40–60)
LDLC SERPL CALC-MCNC: 116 MG/DL (ref 0–100)
LDLC/HDLC SERPL: 2.61 {RATIO}
POTASSIUM SERPL-SCNC: 4.3 MMOL/L (ref 3.5–5.2)
PROT SERPL-MCNC: 7.3 G/DL (ref 6–8.5)
SODIUM SERPL-SCNC: 140 MMOL/L (ref 136–145)
TRIGL SERPL-MCNC: 159 MG/DL (ref 0–150)
TSH SERPL DL<=0.05 MIU/L-ACNC: 1.82 UIU/ML (ref 0.27–4.2)
VLDLC SERPL-MCNC: 28 MG/DL (ref 5–40)

## 2021-12-13 DIAGNOSIS — Z12.11 SCREENING FOR COLON CANCER: Primary | ICD-10-CM

## 2021-12-13 RX ORDER — SODIUM, POTASSIUM,MAG SULFATES 17.5-3.13G
1 SOLUTION, RECONSTITUTED, ORAL ORAL TAKE AS DIRECTED
Qty: 354 ML | Refills: 0 | Status: SHIPPED | OUTPATIENT
Start: 2021-12-13 | End: 2021-12-28

## 2021-12-16 DIAGNOSIS — I10 ESSENTIAL HYPERTENSION: ICD-10-CM

## 2021-12-22 DIAGNOSIS — K21.00 CHRONIC REFLUX ESOPHAGITIS: ICD-10-CM

## 2021-12-22 RX ORDER — PANTOPRAZOLE SODIUM 40 MG/1
TABLET, DELAYED RELEASE ORAL
Qty: 90 TABLET | Refills: 0 | Status: SHIPPED | OUTPATIENT
Start: 2021-12-22 | End: 2022-03-21 | Stop reason: SDUPTHER

## 2021-12-28 ENCOUNTER — OFFICE VISIT (OUTPATIENT)
Dept: FAMILY MEDICINE CLINIC | Facility: CLINIC | Age: 61
End: 2021-12-28

## 2021-12-28 VITALS
SYSTOLIC BLOOD PRESSURE: 106 MMHG | HEIGHT: 62 IN | BODY MASS INDEX: 43.32 KG/M2 | WEIGHT: 235.4 LBS | DIASTOLIC BLOOD PRESSURE: 70 MMHG | OXYGEN SATURATION: 97 % | HEART RATE: 90 BPM | RESPIRATION RATE: 18 BRPM | TEMPERATURE: 96.7 F

## 2021-12-28 DIAGNOSIS — R10.9 FLANK PAIN: Primary | ICD-10-CM

## 2021-12-28 PROCEDURE — 99213 OFFICE O/P EST LOW 20 MIN: CPT | Performed by: FAMILY MEDICINE

## 2021-12-28 RX ORDER — NORTRIPTYLINE HYDROCHLORIDE 10 MG/1
CAPSULE ORAL
COMMUNITY
Start: 2021-10-08

## 2021-12-28 RX ORDER — TAMSULOSIN HYDROCHLORIDE 0.4 MG/1
1 CAPSULE ORAL DAILY
Qty: 14 CAPSULE | Refills: 0 | Status: SHIPPED | OUTPATIENT
Start: 2021-12-28 | End: 2022-09-30

## 2021-12-28 RX ORDER — DOXYCYCLINE HYCLATE 100 MG/1
CAPSULE ORAL
COMMUNITY
Start: 2021-10-14 | End: 2021-12-28

## 2021-12-28 RX ORDER — TRETINOIN 1 MG/G
CREAM TOPICAL
COMMUNITY
Start: 2021-10-14 | End: 2022-09-30 | Stop reason: SDDI

## 2021-12-28 NOTE — PROGRESS NOTES
Follow Up Office Visit      Patient Name: Dee Grider  : 1960   MRN: [id number]     Chief Complaint:    Chief Complaint   Patient presents with   • Flank Pain       History of Present Illness: Dee Grider is a 61 y.o. female who is here today to follow up with flank pain.  She has left and right-sided flank pain.  Patient has history of kidney stones.  Patient says it was fairly severe so she took one of her extra pain medication she had previously been prescribed for kidney stone.  Patient says the pain has improved.    Review of systems was positive for flank pain      Physical exam: CVA tenderness was present with Manish punch.      Subjective        I have reviewed and the following portions of the patient's history were updated as appropriate: past family history, past medical history, past social history, past surgical history and problem list.    Medications:     Current Outpatient Medications:   •  acetaminophen (TYLENOL) 500 MG tablet, Take 500 mg by mouth Every 6 (Six) Hours As Needed., Disp: , Rfl:   •  atorvastatin (LIPITOR) 80 MG tablet, TAKE 1 TABLET BY MOUTH EVERY DAY, Disp: 90 tablet, Rfl: 3  •  Cetirizine HCl (ZYRTEC ALLERGY PO), Take  by mouth., Disp: , Rfl:   •  fluticasone (FLONASE) 50 MCG/ACT nasal spray, 2 sprays into the nostril(s) as directed by provider Daily., Disp: 18.2 mL, Rfl: 3  •  metoprolol tartrate (LOPRESSOR) 25 MG tablet, TAKE 1 TABLET BY MOUTH TWICE A DAY, Disp: 180 tablet, Rfl: 4  •  nortriptyline (PAMELOR) 10 MG capsule, , Disp: , Rfl:   •  pantoprazole (PROTONIX) 40 MG EC tablet, TAKE 1 TABLET BY MOUTH EVERY DAY, Disp: 90 tablet, Rfl: 0  •  tretinoin (RETIN-A) 0.1 % cream, , Disp: , Rfl:   •  tamsulosin (FLOMAX) 0.4 MG capsule 24 hr capsule, Take 1 capsule by mouth Daily., Disp: 14 capsule, Rfl: 0    Allergies:   Allergies   Allergen Reactions   • Aspirin Nausea And Vomiting       Objective     Physical Exam: Please see above  Vital Signs:   Vitals:     "12/28/21 0842   BP: 106/70   Pulse: 90   Resp: 18   Temp: 96.7 °F (35.9 °C)   TempSrc: Temporal   SpO2: 97%   Weight: 107 kg (235 lb 6.4 oz)   Height: 157.5 cm (62\")   PainSc:   4     Body mass index is 43.06 kg/m².          Assessment / Plan      Assessment/Plan:   Diagnoses and all orders for this visit:    1. Flank pain (Primary)  -     tamsulosin (FLOMAX) 0.4 MG capsule 24 hr capsule; Take 1 capsule by mouth Daily.  Dispense: 14 capsule; Refill: 0  -     US Renal Bilateral; Future    Top differential is kidney stone given her history.  Could also be musculoskeletal.  Could also be constipation or bowel related.  Recommend getting ultrasound of bilateral kidneys to rule out hydronephrosis.  We will start Flomax and advised patient to hydrate well over the next several days.  Patient has follow-up with urology already.  Patient has follow-up with PCP as well.    Patient should call back for any additional pain control.  Follow Up:   Return if symptoms worsen or fail to improve.    Wilton Purdy,   [facility]   "

## 2022-01-02 ENCOUNTER — TELEPHONE (OUTPATIENT)
Dept: GASTROENTEROLOGY | Facility: OTHER | Age: 62
End: 2022-01-02

## 2022-01-02 NOTE — TELEPHONE ENCOUNTER
Patient has N/V and diarrhea today. Daughter with similar symptoms. She did not receive instructions for taking prep (Suprep).    Advised we should reschedule her procedure due to infectious diarrhea.

## 2022-01-03 NOTE — TELEPHONE ENCOUNTER
Attempted to call patient to reschedule her colonoscopy.  The phone rang 3x then hung up, so I called again and it rang 2x then hung up.

## 2022-01-04 ENCOUNTER — HOSPITAL ENCOUNTER (OUTPATIENT)
Dept: ULTRASOUND IMAGING | Facility: HOSPITAL | Age: 62
Discharge: HOME | End: 2022-01-04
Admitting: FAMILY MEDICINE

## 2022-01-04 DIAGNOSIS — R10.9 FLANK PAIN: ICD-10-CM

## 2022-01-04 PROCEDURE — 76775 US EXAM ABDO BACK WALL LIM: CPT

## 2022-01-09 PROCEDURE — U0005 INFEC AGEN DETEC AMPLI PROBE: HCPCS | Performed by: NURSE PRACTITIONER

## 2022-01-09 PROCEDURE — U0004 COV-19 TEST NON-CDC HGH THRU: HCPCS | Performed by: NURSE PRACTITIONER

## 2022-01-19 DIAGNOSIS — Z12.11 ENCOUNTER FOR SCREENING COLONOSCOPY: Primary | ICD-10-CM

## 2022-02-08 ENCOUNTER — OUTSIDE FACILITY SERVICE (OUTPATIENT)
Dept: GASTROENTEROLOGY | Facility: CLINIC | Age: 62
End: 2022-02-08

## 2022-02-08 PROCEDURE — 45380 COLONOSCOPY AND BIOPSY: CPT | Performed by: INTERNAL MEDICINE

## 2022-02-08 PROCEDURE — 88305 TISSUE EXAM BY PATHOLOGIST: CPT | Performed by: INTERNAL MEDICINE

## 2022-02-08 PROCEDURE — 45385 COLONOSCOPY W/LESION REMOVAL: CPT | Performed by: INTERNAL MEDICINE

## 2022-02-09 ENCOUNTER — LAB REQUISITION (OUTPATIENT)
Dept: LAB | Facility: HOSPITAL | Age: 62
End: 2022-02-09

## 2022-02-09 DIAGNOSIS — Z86.010 PERSONAL HISTORY OF COLONIC POLYPS: ICD-10-CM

## 2022-02-09 DIAGNOSIS — D12.3 BENIGN NEOPLASM OF TRANSVERSE COLON: ICD-10-CM

## 2022-02-09 DIAGNOSIS — K64.8 OTHER HEMORRHOIDS: ICD-10-CM

## 2022-02-14 ENCOUNTER — TELEMEDICINE (OUTPATIENT)
Dept: FAMILY MEDICINE CLINIC | Facility: CLINIC | Age: 62
End: 2022-02-14

## 2022-02-14 DIAGNOSIS — J30.2 SEASONAL ALLERGIES: ICD-10-CM

## 2022-02-14 DIAGNOSIS — G43.109 MIGRAINE WITH AURA AND WITHOUT STATUS MIGRAINOSUS, NOT INTRACTABLE: Primary | ICD-10-CM

## 2022-02-14 DIAGNOSIS — G89.29 OTHER CHRONIC PAIN: ICD-10-CM

## 2022-02-14 PROCEDURE — 99214 OFFICE O/P EST MOD 30 MIN: CPT | Performed by: FAMILY MEDICINE

## 2022-02-14 RX ORDER — ATOGEPANT 30 MG/1
30 TABLET ORAL DAILY
Qty: 30 TABLET | Refills: 3 | Status: SHIPPED | OUTPATIENT
Start: 2022-02-14 | End: 2022-09-30 | Stop reason: SDDI

## 2022-02-14 NOTE — ASSESSMENT & PLAN NOTE
Worsening seasonal allergies likely secondary to not taking Flonase and recent weather changes.  Patient was advised to continue to take Zyrtec daily and add Flonase to her regimen twice daily for 2 weeks and then once daily as needed after that.  RTC/ED precautions given.

## 2022-02-14 NOTE — PROGRESS NOTES
Dee Grider is a 61 y.o. female who presents today for Back Pain, Shoulder Pain, and Headache    You have chosen to receive care through a telemedicine visit. Do you consent to use a telemedicine visit for your medical care today? Yes.  Patient is in her home in [place] and I am in my office in [place].    Patient is here to follow-up on chronic back and shoulder pain as well as migraines. She has been seen by neurology recently and was started on nortriptyline which has helped the back and shoulder pain which was thought to be secondary to fibromyalgia and myofacial pain. She has not seen any improvement in migraines and she continues to have 3-4 migraines weekly. She has been taking rizatriptan which relieves migraines but does not keep them from coming back. She was having good relief with the metoprolol but it does not seem to be helping anymore. Imitrex caused body aches and cannot take Topamax due to kidney stones.  She has also been on lexapro in the past with no change in migraines. She sees the neurologist again in 3 months. She also complains of sinus pressure in all sinuses which worsens with weather changes. She has been taking the zyrtec everyday but has forgotten to take her flonase.        Review of Systems   Constitutional: Negative for fever and unexpected weight loss.   HENT: Positive for sinus pressure. Negative for congestion, ear pain and sore throat.    Eyes: Negative for visual disturbance.   Respiratory: Negative for cough, shortness of breath and wheezing.    Cardiovascular: Negative for chest pain and palpitations.   Gastrointestinal: Negative for abdominal pain, blood in stool, constipation, diarrhea, nausea, vomiting and GERD.   Endocrine: Negative for polydipsia and polyuria.   Genitourinary: Negative for difficulty urinating.   Musculoskeletal: Negative for joint swelling.   Skin: Negative for rash and skin lesions.   Allergic/Immunologic: Negative for environmental allergies.    Neurological: Positive for headache. Negative for seizures and syncope.   Hematological: Does not bruise/bleed easily.   Psychiatric/Behavioral: Negative for suicidal ideas.        The following portions of the patient's history were reviewed and updated as appropriate: allergies, current medications, past family history, past medical history, past social history, past surgical history and problem list.    Current Outpatient Medications on File Prior to Visit   Medication Sig Dispense Refill   • acetaminophen (TYLENOL) 500 MG tablet Take 500 mg by mouth Every 6 (Six) Hours As Needed.     • atorvastatin (LIPITOR) 80 MG tablet TAKE 1 TABLET BY MOUTH EVERY DAY 90 tablet 3   • Cetirizine HCl (ZYRTEC ALLERGY PO) Take  by mouth.     • fluticasone (FLONASE) 50 MCG/ACT nasal spray 2 sprays into the nostril(s) as directed by provider Daily. 18.2 mL 3   • metoprolol tartrate (LOPRESSOR) 25 MG tablet TAKE 1 TABLET BY MOUTH TWICE A  tablet 4   • nortriptyline (PAMELOR) 10 MG capsule      • pantoprazole (PROTONIX) 40 MG EC tablet TAKE 1 TABLET BY MOUTH EVERY DAY 90 tablet 0   • rizatriptan (MAXALT) 10 MG tablet      • Sodium Sulfate-Mag Sulfate-KCl 1502-673-292 MG tablet Take 1 kit by mouth Take As Directed. 24 tablet 0   • tamsulosin (FLOMAX) 0.4 MG capsule 24 hr capsule Take 1 capsule by mouth Daily. 14 capsule 0   • tretinoin (RETIN-A) 0.1 % cream        No current facility-administered medications on file prior to visit.       Allergies   Allergen Reactions   • Aspirin Nausea And Vomiting        There were no vitals taken for this visit.     Physical Exam  Constitutional:       General: She is not in acute distress.     Appearance: She is well-developed.   Pulmonary:      Effort: Pulmonary effort is normal. No respiratory distress.   Neurological:      Mental Status: She is alert and oriented to person, place, and time.   Psychiatric:         Behavior: Behavior normal.               Problems Addressed this Visit         Allergies and Adverse Reactions    Seasonal allergies     Worsening seasonal allergies likely secondary to not taking Flonase and recent weather changes.  Patient was advised to continue to take Zyrtec daily and add Flonase to her regimen twice daily for 2 weeks and then once daily as needed after that.  RTC/ED precautions given.            Neuro    Migraine - Primary     Headaches are Poorly controlled as patient is having 3-4 headache days a week..  Patient has failed multiple oral medications as described above.  Patient was given a prescription for acute leptin to take daily for migraine prophylaxis.  She will continue take rizatriptan as needed headaches occur.  Patient advised to keep follow-up appoint with neurology for further evaluation and treatment.  RTC/ED precautions given.             Relevant Medications    Atogepant (Qulipta) 30 MG tablet    Other chronic pain     Chronic pain is improved significantly since patient began taking nortriptyline daily.  She will continue this medication as well as follow-up with neurology as scheduled.           Diagnoses       Codes Comments    Migraine with aura and without status migrainosus, not intractable    -  Primary ICD-10-CM: G43.109  ICD-9-CM: 346.00     Seasonal allergies     ICD-10-CM: J30.2  ICD-9-CM: 477.9     Other chronic pain     ICD-10-CM: G89.29  ICD-9-CM: 338.29           Return in about 3 months (around 5/14/2022) for Follow-up migraine, HTN.    This was an audio and video enabled telemedicine encounter.    Sebastian Pal MD   2/14/2022

## 2022-02-14 NOTE — ASSESSMENT & PLAN NOTE
Chronic pain is improved significantly since patient began taking nortriptyline daily.  She will continue this medication as well as follow-up with neurology as scheduled.

## 2022-02-14 NOTE — ASSESSMENT & PLAN NOTE
Headaches are Poorly controlled as patient is having 3-4 headache days a week..  Patient has failed multiple oral medications as described above.  Patient was given a prescription for acute leptin to take daily for migraine prophylaxis.  She will continue take rizatriptan as needed headaches occur.  Patient advised to keep follow-up appoint with neurology for further evaluation and treatment.  RTC/ED precautions given.

## 2022-03-18 ENCOUNTER — OFFICE VISIT (OUTPATIENT)
Dept: CARDIOLOGY | Facility: CLINIC | Age: 62
End: 2022-03-18

## 2022-03-18 VITALS
SYSTOLIC BLOOD PRESSURE: 116 MMHG | HEIGHT: 62 IN | WEIGHT: 232 LBS | BODY MASS INDEX: 42.69 KG/M2 | HEART RATE: 94 BPM | OXYGEN SATURATION: 97 % | DIASTOLIC BLOOD PRESSURE: 74 MMHG

## 2022-03-18 DIAGNOSIS — R00.2 PALPITATIONS: ICD-10-CM

## 2022-03-18 DIAGNOSIS — E78.01 FAMILIAL HYPERCHOLESTEROLEMIA: ICD-10-CM

## 2022-03-18 DIAGNOSIS — R01.1 MURMUR, HEART: ICD-10-CM

## 2022-03-18 DIAGNOSIS — I10 ESSENTIAL HYPERTENSION: Primary | ICD-10-CM

## 2022-03-18 PROCEDURE — 93000 ELECTROCARDIOGRAM COMPLETE: CPT

## 2022-03-18 PROCEDURE — 99214 OFFICE O/P EST MOD 30 MIN: CPT

## 2022-03-18 RX ORDER — METOPROLOL TARTRATE 50 MG/1
50 TABLET, FILM COATED ORAL 2 TIMES DAILY
Qty: 90 TABLET | Refills: 3 | Status: SHIPPED | OUTPATIENT
Start: 2022-03-18 | End: 2022-09-19

## 2022-03-18 NOTE — PROGRESS NOTES
[facility]    Encounter Date: 2022    Patient ID: Dee Grider is a 61 y.o. female.  : 1960     PCP: Sebastian Pal MD       Chief Complaint: Heart Murmur      PROBLEM LIST:  1. Cardiac murmur:  a. Echocardiogram, 2018: EF 60%. LV diastolic dysfunction (grade I). Mild AI.   b. Echocardiogram, 2021: EF 60%. Trace MR. Mild AI. Mild TR with normal RVSP.   2. Hypertension.  3. Familial hypercholesterolemia.  4. Morbid obesity.  5. Tobacco abuse.  6. MANUEL.  7. Pre-diabetes.  8. Seasonal allergies.  9. GERD.    History of Present Illness  Patient presents today for an annual follow-up with a history of heart murmur and cardiac risk factors. Since last visit, the patient has been stable from a cardiac standpoint.Patient has had no interim ER visits, hospitalizations, serious illnesses, or injuries.  She does note occasional palpitations but denies chest pain, shortness of breath, orthopnea, or edema.  She has no additional complaints today.  Her BP has been well controlled at home.    Allergies   Allergen Reactions   • Aspirin Nausea And Vomiting         Current Outpatient Medications:   •  acetaminophen (TYLENOL) 500 MG tablet, Take 500 mg by mouth Every 6 (Six) Hours As Needed., Disp: , Rfl:   •  Atogepant (Qulipta) 30 MG tablet, Take 1 tablet by mouth Daily., Disp: 30 tablet, Rfl: 3  •  atorvastatin (LIPITOR) 80 MG tablet, TAKE 1 TABLET BY MOUTH EVERY DAY, Disp: 90 tablet, Rfl: 3  •  Cetirizine HCl (ZYRTEC ALLERGY PO), Take  by mouth., Disp: , Rfl:   •  fluticasone (FLONASE) 50 MCG/ACT nasal spray, 2 sprays into the nostril(s) as directed by provider Daily., Disp: 18.2 mL, Rfl: 3  •  metoprolol tartrate (LOPRESSOR) 50 MG tablet, Take 1 tablet by mouth 2 (Two) Times a Day., Disp: 90 tablet, Rfl: 3  •  nortriptyline (PAMELOR) 10 MG capsule, , Disp: , Rfl:   •  pantoprazole (PROTONIX) 40 MG EC tablet, TAKE 1 TABLET BY MOUTH EVERY DAY, Disp: 90 tablet,  "Rfl: 0  •  rizatriptan (MAXALT) 10 MG tablet, , Disp: , Rfl:   •  Sodium Sulfate-Mag Sulfate-KCl [id number] MG tablet, Take 1 kit by mouth Take As Directed., Disp: 24 tablet, Rfl: 0  •  tamsulosin (FLOMAX) 0.4 MG capsule 24 hr capsule, Take 1 capsule by mouth Daily., Disp: 14 capsule, Rfl: 0  •  tretinoin (RETIN-A) 0.1 % cream, , Disp: , Rfl:     The following portions of the patient's history were reviewed and updated as appropriate: allergies, current medications, past family history, past medical history, past social history, past surgical history and problem list.    ROS  Review of Systems   Constitution: Negative for chills, fever, fatigue, generalized weakness.   Cardiovascular: Positive for occasional palpitations negative for chest pain, dyspnea on exertion, leg swelling, orthopnea, and syncope.   Respiratory: Negative for cough, shortness of breath, and wheezing.  HENT: Negative for ear pain, nosebleeds, and tinnitus.  Gastrointestinal: Negative for abdominal pain, constipation, diarrhea, nausea and vomiting.   Genitourinary: No urinary symptoms.  Musculoskeletal: Negative for muscle cramps.  Neurological: Negative for dizziness, headaches, loss of balance, numbness, and symptoms of stroke.  Psychiatric: Normal mental status.     All other systems reviewed and are negative.        Objective:     /74 (BP Location: Left arm, Patient Position: Sitting)   Pulse 94   Ht 157.5 cm (62\")   Wt 105 kg (232 lb)   SpO2 97%   BMI 42.43 kg/m²      Physical Exam  Constitutional: Patient appears well-developed and well-nourished.   HENT: HEENT exam unremarkable.   Neck: Neck supple. No JVD present. No carotid bruits.   Cardiovascular: Normal rate, regular rhythm and normal heart sounds. No murmur heard.   2+ symmetric pulses.   Pulmonary/Chest: Breath sounds normal. Does not exhibit tenderness.   Abdominal: Abdomen benign.   Musculoskeletal: Does not exhibit edema.   Neurological: Neurological exam " unremarkable.   Vitals reviewed.    Data Review:   Lab Results   Component Value Date    GLUCOSE 92 10/06/2021    BUN 9 10/06/2021    CREATININE 0.61 10/06/2021    EGFRIFNONA 100 10/06/2021    EGFRIFAFRI 122 10/06/2021    BCR 14.8 10/06/2021    K 4.3 10/06/2021    CO2 28.1 10/06/2021    CALCIUM 9.8 10/06/2021    ALBUMIN 4.60 10/06/2021    AST 17 10/06/2021    ALT 20 10/06/2021     Lab Results   Component Value Date    CHOL 187 10/06/2021    TRIG 159 (H) 10/06/2021    HDL 43 10/06/2021     (H) 10/06/2021      Lab Results   Component Value Date    WBC 9.05 10/06/2021    RBC 4.49 10/06/2021    HGB 13.6 10/06/2021    HCT 40.6 10/06/2021    MCV 90.4 10/06/2021     10/06/2021     Lab Results   Component Value Date    TSH 1.820 10/06/2021     Lab Results   Component Value Date    HGBA1C 6.10 (H) 10/06/2021          ECG 12 Lead    Date/Time: 3/18/2022 3:39 PM  Performed by: Candice Rivers PA-C  Authorized by: Candice Rivers PA-C   Comparison: compared with previous ECG from 10/19/2018  Similar to previous ECG  Rhythm: sinus rhythm  Rate: normal  BPM: 90    Clinical impression: normal ECG             Advance Care Planning   ACP discussion was held with the patient during this visit. Patient does not have an advance directive, declines further assistance.           Assessment:      Diagnosis Plan   1. Essential hypertension   BP well controlled.  Continue antihypertensive regimen.   2. Familial hypercholesterolemia   acceptable profile.  Discussed importance of diet and exercise.  Continue Lipitor 80 mg daily.   3. Palpitations   patient with occasional palpitations.  Rate today 90 bpm.  Will increase Lopressor to 50 mg twice daily.     Plan:   Patient with occasional palpitations and a heart rate of 90.  Will increase Lopressor to 50 mg twice daily.  Overall stable cardiac status.  Continue current medications.   FU in 6 MO, sooner as needed.  Thank you for allowing us to participate in the care of your  patient.     Candice Rivers PA-C    Part of this note may be an electronic transcription/translation of spoken language to printed text using the Dragon Dictation System.

## 2022-03-21 DIAGNOSIS — K21.00 CHRONIC REFLUX ESOPHAGITIS: ICD-10-CM

## 2022-03-21 RX ORDER — PANTOPRAZOLE SODIUM 40 MG/1
40 TABLET, DELAYED RELEASE ORAL DAILY
Qty: 90 TABLET | Refills: 0 | Status: SHIPPED | OUTPATIENT
Start: 2022-03-21 | End: 2022-06-16

## 2022-04-20 RX ORDER — ATORVASTATIN CALCIUM 80 MG/1
80 TABLET, FILM COATED ORAL DAILY
Qty: 90 TABLET | Refills: 0 | Status: SHIPPED | OUTPATIENT
Start: 2022-04-20 | End: 2022-04-20

## 2022-04-20 RX ORDER — ATORVASTATIN CALCIUM 80 MG/1
TABLET, FILM COATED ORAL
Qty: 90 TABLET | Refills: 0 | Status: SHIPPED | OUTPATIENT
Start: 2022-04-20 | End: 2022-11-04 | Stop reason: SDUPTHER

## 2022-04-20 NOTE — TELEPHONE ENCOUNTER
Caller: Dee Grider    Relationship: Self    Best call back number: [phone]  Requested Prescriptions:   Requested Prescriptions     Pending Prescriptions Disp Refills   • atorvastatin (LIPITOR) 80 MG tablet 90 tablet 3     Sig: Take 1 tablet by mouth Daily.        Pharmacy where request should be sent: [facility]/PHARMACY #[id number] - [place], [place] - [address] [place] [address] AT [facility] - [phone]  - [phone]      Additional details provided by patient: THE PATIENT STATES THAT SHE HAS 8 PILLS LEFT CURRENTLY SHE STATES THAT SHE IS LEAVING ON A TRIP OUT OF THE [place] ON Friday NIGHT THE PATIENT WOULD LIKE TO HAVE A 30 DAY SUPPLY CALLED IN TO [facility] TODAY SHE STATES THAT IF THE MEDICATION CANNOT BE CALLED IN TODAY SHE WOULD NEED THE MEDICATION SENT TO A [facility] IN [facility] PLEASE CALL THE PATIENT TO LET HER KNOW IF THAT CAN BE DONE     Does the patient have less than a 3 day supply:  [] Yes  [x] No    Ben Muñoz Rep   04/20/22 13:48 EDT

## 2022-06-16 DIAGNOSIS — K21.00 CHRONIC REFLUX ESOPHAGITIS: ICD-10-CM

## 2022-06-16 RX ORDER — PANTOPRAZOLE SODIUM 40 MG/1
TABLET, DELAYED RELEASE ORAL
Qty: 90 TABLET | Refills: 0 | Status: SHIPPED | OUTPATIENT
Start: 2022-06-16 | End: 2022-09-19

## 2022-09-15 DIAGNOSIS — I10 ESSENTIAL HYPERTENSION: ICD-10-CM

## 2022-09-17 DIAGNOSIS — K21.00 CHRONIC REFLUX ESOPHAGITIS: ICD-10-CM

## 2022-09-19 RX ORDER — PANTOPRAZOLE SODIUM 40 MG/1
TABLET, DELAYED RELEASE ORAL
Qty: 90 TABLET | Refills: 0 | Status: SHIPPED | OUTPATIENT
Start: 2022-09-19 | End: 2022-12-27

## 2022-09-19 RX ORDER — METOPROLOL TARTRATE 50 MG/1
TABLET, FILM COATED ORAL
Qty: 180 TABLET | Refills: 0 | Status: SHIPPED | OUTPATIENT
Start: 2022-09-19 | End: 2022-12-23

## 2022-09-19 NOTE — TELEPHONE ENCOUNTER
Rx Refill Note  Requested Prescriptions     Pending Prescriptions Disp Refills   • pantoprazole (PROTONIX) 40 MG EC tablet [Pharmacy Med Name: PANTOPRAZOLE SOD DR 40 MG TAB] 90 tablet 0     Sig: TAKE 1 TABLET BY MOUTH EVERY DAY      Last office visit with prescribing clinician: 10/6/2021      Next office visit with prescribing clinician: 10/10/2022            Zeny Bose LPN  09/19/22, 09:44 EDT

## 2022-09-30 ENCOUNTER — OFFICE VISIT (OUTPATIENT)
Dept: CARDIOLOGY | Facility: CLINIC | Age: 62
End: 2022-09-30

## 2022-09-30 VITALS
DIASTOLIC BLOOD PRESSURE: 78 MMHG | BODY MASS INDEX: 41.41 KG/M2 | HEART RATE: 105 BPM | WEIGHT: 225 LBS | SYSTOLIC BLOOD PRESSURE: 128 MMHG | HEIGHT: 62 IN | OXYGEN SATURATION: 97 %

## 2022-09-30 DIAGNOSIS — R00.2 PALPITATIONS: ICD-10-CM

## 2022-09-30 DIAGNOSIS — E78.01 FAMILIAL HYPERCHOLESTEROLEMIA: ICD-10-CM

## 2022-09-30 DIAGNOSIS — I10 ESSENTIAL HYPERTENSION: Primary | ICD-10-CM

## 2022-09-30 PROCEDURE — 99214 OFFICE O/P EST MOD 30 MIN: CPT | Performed by: INTERNAL MEDICINE

## 2022-10-21 ENCOUNTER — LAB (OUTPATIENT)
Dept: LAB | Facility: HOSPITAL | Age: 62
End: 2022-10-21

## 2022-10-21 ENCOUNTER — OFFICE VISIT (OUTPATIENT)
Dept: FAMILY MEDICINE CLINIC | Facility: CLINIC | Age: 62
End: 2022-10-21

## 2022-10-21 VITALS
HEART RATE: 97 BPM | BODY MASS INDEX: 41.22 KG/M2 | SYSTOLIC BLOOD PRESSURE: 130 MMHG | HEIGHT: 62 IN | TEMPERATURE: 98.3 F | DIASTOLIC BLOOD PRESSURE: 80 MMHG | OXYGEN SATURATION: 99 % | WEIGHT: 224 LBS

## 2022-10-21 DIAGNOSIS — Z01.419 WELL WOMAN EXAM: ICD-10-CM

## 2022-10-21 DIAGNOSIS — E78.01 FAMILIAL HYPERCHOLESTEROLEMIA: ICD-10-CM

## 2022-10-21 DIAGNOSIS — I10 PRIMARY HYPERTENSION: ICD-10-CM

## 2022-10-21 DIAGNOSIS — R73.03 PREDIABETES: ICD-10-CM

## 2022-10-21 DIAGNOSIS — Z00.00 MEDICARE ANNUAL WELLNESS VISIT, SUBSEQUENT: Primary | ICD-10-CM

## 2022-10-21 DIAGNOSIS — Z12.31 ENCOUNTER FOR SCREENING MAMMOGRAM FOR BREAST CANCER: ICD-10-CM

## 2022-10-21 DIAGNOSIS — Z00.00 MEDICARE ANNUAL WELLNESS VISIT, SUBSEQUENT: ICD-10-CM

## 2022-10-21 PROCEDURE — 0124A PR ADM SARSCOV2 30MCG/0.3ML BST: CPT | Performed by: FAMILY MEDICINE

## 2022-10-21 PROCEDURE — 83036 HEMOGLOBIN GLYCOSYLATED A1C: CPT

## 2022-10-21 PROCEDURE — G0439 PPPS, SUBSEQ VISIT: HCPCS | Performed by: FAMILY MEDICINE

## 2022-10-21 PROCEDURE — 1159F MED LIST DOCD IN RCRD: CPT | Performed by: FAMILY MEDICINE

## 2022-10-21 PROCEDURE — 80061 LIPID PANEL: CPT

## 2022-10-21 PROCEDURE — 85025 COMPLETE CBC W/AUTO DIFF WBC: CPT

## 2022-10-21 PROCEDURE — 80053 COMPREHEN METABOLIC PANEL: CPT

## 2022-10-21 PROCEDURE — 1170F FXNL STATUS ASSESSED: CPT | Performed by: FAMILY MEDICINE

## 2022-10-21 PROCEDURE — 91312 COVID-19 (PFIZER) BIVALENT BOOSTER 12+YRS: CPT | Performed by: FAMILY MEDICINE

## 2022-10-21 PROCEDURE — 1126F AMNT PAIN NOTED NONE PRSNT: CPT | Performed by: FAMILY MEDICINE

## 2022-10-21 RX ORDER — DOCUSATE SODIUM 100 MG/1
100 CAPSULE, LIQUID FILLED ORAL 2 TIMES DAILY
COMMUNITY

## 2022-10-21 NOTE — PATIENT INSTRUCTIONS
"Hypertension, Adult  High blood pressure (hypertension) is when the force of blood pumping through the arteries is too strong. The arteries are the blood vessels that carry blood from the heart throughout the body. Hypertension forces the heart to work harder to pump blood and may cause arteries to become narrow or stiff. Untreated or uncontrolled hypertension can cause a heart attack, heart failure, a stroke, kidney disease, and other problems.  A blood pressure reading consists of a higher number over a lower number. Ideally, your blood pressure should be below 120/80. The first (\"top\") number is called the systolic pressure. It is a measure of the pressure in your arteries as your heart beats. The second (\"bottom\") number is called the diastolic pressure. It is a measure of the pressure in your arteries as the heart relaxes.  What are the causes?  The exact cause of this condition is not known. There are some conditions that result in or are related to high blood pressure.  What increases the risk?  Some risk factors for high blood pressure are under your control. The following factors may make you more likely to develop this condition:  Smoking.  Having type 2 diabetes mellitus, high cholesterol, or both.  Not getting enough exercise or physical activity.  Being overweight.  Having too much fat, sugar, calories, or salt (sodium) in your diet.  Drinking too much alcohol.  Some risk factors for high blood pressure may be difficult or impossible to change. Some of these factors include:  Having chronic kidney disease.  Having a family history of high blood pressure.  Age. Risk increases with age.  Race. You may be at higher risk if you are .  Gender. Men are at higher risk than women before age 45. After age 65, women are at higher risk than men.  Having obstructive sleep apnea.  Stress.  What are the signs or symptoms?  High blood pressure may not cause symptoms. Very high blood pressure " (hypertensive crisis) may cause:  Headache.  Anxiety.  Shortness of breath.  Nosebleed.  Nausea and vomiting.  Vision changes.  Severe chest pain.  Seizures.  How is this diagnosed?  This condition is diagnosed by measuring your blood pressure while you are seated, with your arm resting on a flat surface, your legs uncrossed, and your feet flat on the floor. The cuff of the blood pressure monitor will be placed directly against the skin of your upper arm at the level of your heart. It should be measured at least twice using the same arm. Certain conditions can cause a difference in blood pressure between your right and left arms.  Certain factors can cause blood pressure readings to be lower or higher than normal for a short period of time:  When your blood pressure is higher when you are in a health care provider's office than when you are at home, this is called white coat hypertension. Most people with this condition do not need medicines.  When your blood pressure is higher at home than when you are in a health care provider's office, this is called masked hypertension. Most people with this condition may need medicines to control blood pressure.  If you have a high blood pressure reading during one visit or you have normal blood pressure with other risk factors, you may be asked to:  Return on a different day to have your blood pressure checked again.  Monitor your blood pressure at home for 1 week or longer.  If you are diagnosed with hypertension, you may have other blood or imaging tests to help your health care provider understand your overall risk for other conditions.  How is this treated?  This condition is treated by making healthy lifestyle changes, such as eating healthy foods, exercising more, and reducing your alcohol intake. Your health care provider may prescribe medicine if lifestyle changes are not enough to get your blood pressure under control, and if:  Your systolic blood pressure is above  130.  Your diastolic blood pressure is above 80.  Your personal target blood pressure may vary depending on your medical conditions, your age, and other factors.  Follow these instructions at home:  Eating and drinking    Eat a diet that is high in fiber and potassium, and low in sodium, added sugar, and fat. An example eating plan is called the DASH (Dietary Approaches to Stop Hypertension) diet. To eat this way:  Eat plenty of fresh fruits and vegetables. Try to fill one half of your plate at each meal with fruits and vegetables.  Eat whole grains, such as whole-wheat pasta, brown rice, or whole-grain bread. Fill about one fourth of your plate with whole grains.  Eat or drink low-fat dairy products, such as skim milk or low-fat yogurt.  Avoid fatty cuts of meat, processed or cured meats, and poultry with skin. Fill about one fourth of your plate with lean proteins, such as fish, chicken without skin, beans, eggs, or tofu.  Avoid pre-made and processed foods. These tend to be higher in sodium, added sugar, and fat.  Reduce your daily sodium intake. Most people with hypertension should eat less than 1,500 mg of sodium a day.  Do not drink alcohol if:  Your health care provider tells you not to drink.  You are pregnant, may be pregnant, or are planning to become pregnant.  If you drink alcohol:  Limit how much you use to:  0-1 drink a day for women.  0-2 drinks a day for men.  Be aware of how much alcohol is in your drink. In the [place] one drink equals one 12 oz bottle of beer (355 mL), one 5 oz glass of wine (148 mL), or one 1½ oz glass of hard liquor (44 mL).  Lifestyle    Work with your health care provider to maintain a healthy body weight or to lose weight. Ask what an ideal weight is for you.  Get at least 30 minutes of exercise most days of the week. Activities may include walking, swimming, or biking.  Include exercise to strengthen your muscles (resistance exercise), such as Pilates or lifting weights, as  part of your weekly exercise routine. Try to do these types of exercises for 30 minutes at least 3 days a week.  Do not use any products that contain nicotine or tobacco, such as cigarettes, e-cigarettes, and chewing tobacco. If you need help quitting, ask your health care provider.  Monitor your blood pressure at home as told by your health care provider.  Keep all follow-up visits as told by your health care provider. This is important.  Medicines  Take over-the-counter and prescription medicines only as told by your health care provider. Follow directions carefully. Blood pressure medicines must be taken as prescribed.  Do not skip doses of blood pressure medicine. Doing this puts you at risk for problems and can make the medicine less effective.  Ask your health care provider about side effects or reactions to medicines that you should watch for.  Contact a health care provider if you:  Think you are having a reaction to a medicine you are taking.  Have headaches that keep coming back (recurring).  Feel dizzy.  Have swelling in your ankles.  Have trouble with your vision.  Get help right away if you:  Develop a severe headache or confusion.  Have unusual weakness or numbness.  Feel faint.  Have severe pain in your chest or abdomen.  Vomit repeatedly.  Have trouble breathing.  Summary  Hypertension is when the force of blood pumping through your arteries is too strong. If this condition is not controlled, it may put you at risk for serious complications.  Your personal target blood pressure may vary depending on your medical conditions, your age, and other factors. For most people, a normal blood pressure is less than 120/80.  Hypertension is treated with lifestyle changes, medicines, or a combination of both. Lifestyle changes include losing weight, eating a healthy, low-sodium diet, exercising more, and limiting alcohol.  This information is not intended to replace advice given to you by your health care  "provider. Make sure you discuss any questions you have with your health care provider.  Document Revised: 08/28/2019 Document Reviewed: 08/28/2019  [employer] Patient Education © 2022 [employer]  BMI for Adults  What is BMI?  Body mass index (BMI) is a number that is calculated from a person's weight and height. BMI can help estimate how much of a person's weight is composed of fat. BMI does not measure body fat directly. Rather, it is an alternative to procedures that directly measure body fat, which can be difficult and expensive.  BMI can help identify people who may be at higher risk for certain medical problems.  What are BMI measurements used for?  BMI is used as a screening tool to identify possible weight problems. It helps determine whether a person is obese, overweight, a healthy weight, or underweight.  BMI is useful for:  Identifying a weight problem that may be related to a medical condition or may increase the risk for medical problems.  Promoting changes, such as changes in diet and exercise, to help reach a healthy weight. BMI screening can be repeated to see if these changes are working.  How is BMI calculated?  BMI involves measuring your weight in relation to your height. Both height and weight are measured, and the BMI is calculated from those numbers. This can be done either in English ([place] or metric measurements. Note that charts and online BMI calculators are available to help you find your BMI quickly and easily without having to do these calculations yourself.  To calculate your BMI in English ([place] measurements:    Measure your weight in pounds (lb).  Multiply the number of pounds by 703.  For example, for a person who weighs 180 lb, multiply that number by 703, which equals 126,540.  Measure your height in inches. Then multiply that number by itself to get a measurement called \"inches squared.\"  For example, for a person who is 70 inches tall, the \"inches squared\" measurement is 70 " "inches x 70 inches, which equals 4,900 inches squared.  Divide the total from step 2 (number of lb x 703) by the total from step 3 (inches squared): 126,540 ÷ 4,900 = 25.8. This is your BMI.  To calculate your BMI in metric measurements:  Measure your weight in kilograms (kg).  Measure your height in meters (m). Then multiply that number by itself to get a measurement called \"meters squared.\"  For example, for a person who is 1.75 m tall, the \"meters squared\" measurement is 1.75 m x 1.75 m, which is equal to 3.1 meters squared.  Divide the number of kilograms (your weight) by the meters squared number. In this example: 70 ÷ 3.1 = 22.6. This is your BMI.  What do the results mean?  BMI charts are used to identify whether you are underweight, normal weight, overweight, or obese. The following guidelines will be used:  Underweight: BMI less than 18.5.  Normal weight: BMI between 18.5 and 24.9.  Overweight: BMI between 25 and 29.9.  Obese: BMI of 30 or above.  Keep these notes in mind:  Weight includes both fat and muscle, so someone with a muscular build, such as an athlete, may have a BMI that is higher than 24.9. In cases like these, BMI is not an accurate measure of body fat.  To determine if excess body fat is the cause of a BMI of 25 or higher, further assessments may need to be done by a health care provider.  BMI is usually interpreted in the same way for men and women.  Where to find more information  For more information about BMI, including tools to quickly calculate your BMI, go to these websites:  Centers for Disease Control and Prevention: www.cdc.gov  American Heart Association: www.heart.org  National Heart, Lung, and Blood [place]: www.nhlbi.nih.gov  Summary  Body mass index (BMI) is a number that is calculated from a person's weight and height.  BMI may help estimate how much of a person's weight is composed of fat. BMI can help identify those who may be at higher risk for certain medical " problems.  BMI can be measured using English measurements or metric measurements.  BMI charts are used to identify whether you are underweight, normal weight, overweight, or obese.  This information is not intended to replace advice given to you by your health care provider. Make sure you discuss any questions you have with your health care provider.  Document Revised: 2020 Document Reviewed: 2020  [employer] Patient Education ©  [employer]    Medicare Wellness  Personal Prevention Plan of Service     Date of Office Visit:    Encounter Provider:  Sebastian Pal MD  Place of Service:  [facility]  Patient Name: Dee Grider  :  1960    As part of the Medicare Wellness portion of your visit today, we are providing you with this personalized preventive plan of services (PPPS). This plan is based upon recommendations of the [place] Preventive Services Task Force (USPSTF) and the Advisory Committee on Immunization Practices (ACIP).    This lists the preventive care services that should be considered, and provides dates of when you are due. Items listed as completed are up-to-date and do not require any further intervention.    Health Maintenance   Topic Date Due    COVID-19 Vaccine (3 - Booster for Moderna series) 2021    MAMMOGRAM  2022    PAP SMEAR  2022    ANNUAL WELLNESS VISIT  10/06/2022    LIPID PANEL  10/06/2022    Pneumococcal Vaccine 0-64 (1 - PCV) 10/21/2022 (Originally 1966)    INFLUENZA VACCINE  2023 (Originally 2022)    COLORECTAL CANCER SCREENING  2025    HEPATITIS C SCREENING  Completed    TDAP/TD VACCINES  Discontinued    ZOSTER VACCINE  Discontinued       Orders Placed This Encounter   Procedures    COVID-19 Bivalent Booster (Pfizer) 12+yrs       Return in about 6 months (around 2023) for Follow-up HTN, HLD, prediabetes.       Smoking Tobacco Information, Adult  Smoking tobacco can be harmful  to your health. Tobacco contains a poisonous (toxic), colorless chemical called nicotine. Nicotine is addictive. It changes the brain and can make it hard to stop smoking. Tobacco also has other toxic chemicals that can hurt your body and raise your risk of many cancers.  How can smoking tobacco affect me?  Smoking tobacco puts you at risk for:  Cancer. Smoking is most commonly associated with lung cancer, but can also lead to cancer in other parts of the body.  Chronic obstructive pulmonary disease (COPD). This is a long-term lung condition that makes it hard to breathe. It also gets worse over time.  High blood pressure (hypertension), heart disease, stroke, or heart attack.  Lung infections, such as pneumonia.  Cataracts. This is when the lenses in the eyes become clouded.  Digestive problems. This may include peptic ulcers, heartburn, and gastroesophageal reflux disease (GERD).  Oral health problems, such as gum disease and tooth loss.  Loss of taste and smell.  Smoking can affect your appearance by causing:  Wrinkles.  Yellow or stained teeth, fingers, and fingernails.  Smoking tobacco can also affect your social life, because:  It may be challenging to find places to smoke when away from home. Many workplaces, restaurants, hotels, and public places are tobacco-free.  Smoking is expensive. This is due to the cost of tobacco and the long-term costs of treating health problems from smoking.  Secondhand smoke may affect those around you. Secondhand smoke can cause lung cancer, breathing problems, and heart disease. Children of smokers have a higher risk for:  Sudden infant death syndrome (SIDS).  Ear infections.  Lung infections.  If you currently smoke tobacco, quitting now can help you:  Lead a longer and healthier life.  Look, smell, breathe, and feel better over time.  Save money.  Protect others from the harms of secondhand smoke.  What actions can I take to prevent health problems?  Quit smoking    Do not  start smoking. Quit if you already do.  Make a plan to quit smoking and commit to it. Look for programs to help you and ask your health care provider for recommendations and ideas.  Set a date and write down all the reasons you want to quit.  Let your friends and family know you are quitting so they can help and support you. Consider finding friends who also want to quit. It can be easier to quit with someone else, so that you can support each other.  Talk with your health care provider about using nicotine replacement medicines to help you quit, such as gum, lozenges, patches, sprays, or pills.  Do not replace cigarette smoking with electronic cigarettes, which are commonly called e-cigarettes. The safety of e-cigarettes is not known, and some may contain harmful chemicals.  If you try to quit but return to smoking, stay positive. It is common to slip up when you first quit, so take it one day at a time.  Be prepared for cravings. When you feel the urge to smoke, chew gum or suck on hard candy.  Lifestyle  Stay busy and take care of your body.  Drink enough fluid to keep your urine pale yellow.  Get plenty of exercise and eat a healthy diet. This can help prevent weight gain after quitting.  Monitor your eating habits. Quitting smoking can cause you to have a larger appetite than when you smoke.  Find ways to relax. Go out with friends or family to a movie or a restaurant where people do not smoke.  Ask your health care provider about having regular tests (screenings) to check for cancer. This may include blood tests, imaging tests, and other tests.  Find ways to manage your stress, such as meditation, yoga, or exercise.  Where to find support  To get support to quit smoking, consider:  Asking your health care provider for more information and resources.  Taking classes to learn more about quitting smoking.  Looking for local organizations that offer resources about quitting smoking.  Joining a support group for  people who want to quit smoking in your local community.  Calling the smokefree.gov counselor helpline: 1-800-Quit-Now (1-782.207.8267)  Where to find more information  You may find more information about quitting smoking from:  HelpGuide.org: www.helpguide.org  Smokefree.gov: smokefree.gov  American Lung Association: www.lung.org  Contact a health care provider if you:  Have problems breathing.  Notice that your lips, nose, or fingers turn blue.  Have chest pain.  Are coughing up blood.  Feel faint or you pass out.  Have other health changes that cause you to worry.  Summary  Smoking tobacco can negatively affect your health, the health of those around you, your finances, and your social life.  Do not start smoking. Quit if you already do. If you need help quitting, ask your health care provider.  Think about joining a support group for people who want to quit smoking in your local community. There are many effective programs that will help you to quit this behavior.  This information is not intended to replace advice given to you by your health care provider. Make sure you discuss any questions you have with your health care provider.  Document Revised: 08/22/2022 Document Reviewed: 11/09/2021  [employer] Patient Education © 2022 [employer]
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
## 2022-10-22 LAB
ALBUMIN SERPL-MCNC: 4.3 G/DL (ref 3.5–5.2)
ALBUMIN/GLOB SERPL: 1.5 G/DL
ALP SERPL-CCNC: 112 U/L (ref 39–117)
ALT SERPL W P-5'-P-CCNC: 17 U/L (ref 1–33)
ANION GAP SERPL CALCULATED.3IONS-SCNC: 11.3 MMOL/L (ref 5–15)
AST SERPL-CCNC: 17 U/L (ref 1–32)
BASOPHILS # BLD AUTO: 0.07 10*3/MM3 (ref 0–0.2)
BASOPHILS NFR BLD AUTO: 0.7 % (ref 0–1.5)
BILIRUB SERPL-MCNC: 0.3 MG/DL (ref 0–1.2)
BUN SERPL-MCNC: 8 MG/DL (ref 8–23)
BUN/CREAT SERPL: 12.1 (ref 7–25)
CALCIUM SPEC-SCNC: 9.7 MG/DL (ref 8.6–10.5)
CHLORIDE SERPL-SCNC: 102 MMOL/L (ref 98–107)
CHOLEST SERPL-MCNC: 178 MG/DL (ref 0–200)
CO2 SERPL-SCNC: 26.7 MMOL/L (ref 22–29)
CREAT SERPL-MCNC: 0.66 MG/DL (ref 0.57–1)
DEPRECATED RDW RBC AUTO: 45 FL (ref 37–54)
EGFRCR SERPLBLD CKD-EPI 2021: 100.6 ML/MIN/1.73
EOSINOPHIL # BLD AUTO: 0.53 10*3/MM3 (ref 0–0.4)
EOSINOPHIL NFR BLD AUTO: 5 % (ref 0.3–6.2)
ERYTHROCYTE [DISTWIDTH] IN BLOOD BY AUTOMATED COUNT: 13.3 % (ref 12.3–15.4)
GLOBULIN UR ELPH-MCNC: 2.9 GM/DL
GLUCOSE SERPL-MCNC: 89 MG/DL (ref 65–99)
HBA1C MFR BLD: 6.1 % (ref 4.8–5.6)
HCT VFR BLD AUTO: 41.7 % (ref 34–46.6)
HDLC SERPL-MCNC: 49 MG/DL (ref 40–60)
HGB BLD-MCNC: 13.8 G/DL (ref 12–15.9)
IMM GRANULOCYTES # BLD AUTO: 0.03 10*3/MM3 (ref 0–0.05)
IMM GRANULOCYTES NFR BLD AUTO: 0.3 % (ref 0–0.5)
LDLC SERPL CALC-MCNC: 106 MG/DL (ref 0–100)
LDLC/HDLC SERPL: 2.09 {RATIO}
LYMPHOCYTES # BLD AUTO: 4.23 10*3/MM3 (ref 0.7–3.1)
LYMPHOCYTES NFR BLD AUTO: 39.8 % (ref 19.6–45.3)
MCH RBC QN AUTO: 30.5 PG (ref 26.6–33)
MCHC RBC AUTO-ENTMCNC: 33.1 G/DL (ref 31.5–35.7)
MCV RBC AUTO: 92.1 FL (ref 79–97)
MONOCYTES # BLD AUTO: 0.49 10*3/MM3 (ref 0.1–0.9)
MONOCYTES NFR BLD AUTO: 4.6 % (ref 5–12)
NEUTROPHILS NFR BLD AUTO: 49.6 % (ref 42.7–76)
NEUTROPHILS NFR BLD AUTO: 5.29 10*3/MM3 (ref 1.7–7)
NRBC BLD AUTO-RTO: 0.1 /100 WBC (ref 0–0.2)
PLATELET # BLD AUTO: 282 10*3/MM3 (ref 140–450)
PMV BLD AUTO: 10.3 FL (ref 6–12)
POTASSIUM SERPL-SCNC: 4.2 MMOL/L (ref 3.5–5.2)
PROT SERPL-MCNC: 7.2 G/DL (ref 6–8.5)
RBC # BLD AUTO: 4.53 10*6/MM3 (ref 3.77–5.28)
SODIUM SERPL-SCNC: 140 MMOL/L (ref 136–145)
TRIGL SERPL-MCNC: 132 MG/DL (ref 0–150)
VLDLC SERPL-MCNC: 23 MG/DL (ref 5–40)
WBC NRBC COR # BLD: 10.64 10*3/MM3 (ref 3.4–10.8)

## 2022-11-04 RX ORDER — ATORVASTATIN CALCIUM 80 MG/1
80 TABLET, FILM COATED ORAL DAILY
Qty: 90 TABLET | Refills: 0 | Status: SHIPPED | OUTPATIENT
Start: 2022-11-04 | End: 2023-02-08

## 2022-11-18 ENCOUNTER — TRANSCRIBE ORDERS (OUTPATIENT)
Dept: FAMILY MEDICINE CLINIC | Facility: CLINIC | Age: 62
End: 2022-11-18

## 2022-11-28 ENCOUNTER — TELEPHONE (OUTPATIENT)
Dept: FAMILY MEDICINE CLINIC | Facility: CLINIC | Age: 62
End: 2022-11-28

## 2022-11-28 DIAGNOSIS — R92.8 OTHER ABNORMAL AND INCONCLUSIVE FINDINGS ON DIAGNOSTIC IMAGING OF BREAST: ICD-10-CM

## 2022-11-28 DIAGNOSIS — Z12.31 ENCOUNTER FOR SCREENING MAMMOGRAM FOR BREAST CANCER: Primary | ICD-10-CM

## 2022-12-23 DIAGNOSIS — I10 ESSENTIAL HYPERTENSION: ICD-10-CM

## 2022-12-23 RX ORDER — METOPROLOL TARTRATE 50 MG/1
TABLET, FILM COATED ORAL
Qty: 180 TABLET | Refills: 1 | Status: SHIPPED | OUTPATIENT
Start: 2022-12-23

## 2022-12-24 DIAGNOSIS — K21.00 CHRONIC REFLUX ESOPHAGITIS: ICD-10-CM

## 2022-12-27 RX ORDER — PANTOPRAZOLE SODIUM 40 MG/1
TABLET, DELAYED RELEASE ORAL
Qty: 90 TABLET | Refills: 0 | Status: SHIPPED | OUTPATIENT
Start: 2022-12-27 | End: 2023-03-06

## 2023-02-08 RX ORDER — ATORVASTATIN CALCIUM 80 MG/1
TABLET, FILM COATED ORAL
Qty: 90 TABLET | Refills: 0 | Status: SHIPPED | OUTPATIENT
Start: 2023-02-08 | End: 2023-03-06

## 2023-02-27 ENCOUNTER — OFFICE VISIT (OUTPATIENT)
Dept: FAMILY MEDICINE CLINIC | Facility: CLINIC | Age: 63
End: 2023-02-27
Payer: MEDICARE

## 2023-02-27 ENCOUNTER — TREATMENT (OUTPATIENT)
Dept: PHYSICAL THERAPY | Facility: CLINIC | Age: 63
End: 2023-02-27
Payer: MEDICARE

## 2023-02-27 VITALS
OXYGEN SATURATION: 96 % | SYSTOLIC BLOOD PRESSURE: 126 MMHG | BODY MASS INDEX: 40.56 KG/M2 | HEIGHT: 62 IN | HEART RATE: 78 BPM | DIASTOLIC BLOOD PRESSURE: 80 MMHG | WEIGHT: 220.4 LBS | TEMPERATURE: 97.8 F

## 2023-02-27 DIAGNOSIS — M54.9 UPPER BACK PAIN: Primary | ICD-10-CM

## 2023-02-27 DIAGNOSIS — M54.12 CERVICAL RADICULOPATHY: ICD-10-CM

## 2023-02-27 DIAGNOSIS — M79.602 LEFT ARM PAIN: ICD-10-CM

## 2023-02-27 DIAGNOSIS — M54.12 RADICULOPATHY, CERVICAL: Primary | ICD-10-CM

## 2023-02-27 PROCEDURE — 97162 PT EVAL MOD COMPLEX 30 MIN: CPT | Performed by: PHYSICAL THERAPIST

## 2023-02-27 PROCEDURE — 99213 OFFICE O/P EST LOW 20 MIN: CPT | Performed by: FAMILY MEDICINE

## 2023-02-27 PROCEDURE — 97140 MANUAL THERAPY 1/> REGIONS: CPT | Performed by: PHYSICAL THERAPIST

## 2023-02-27 RX ORDER — ATORVASTATIN CALCIUM 80 MG/1
1 TABLET, FILM COATED ORAL DAILY
COMMUNITY
Start: 2022-11-04 | End: 2023-02-27

## 2023-02-27 RX ORDER — PANTOPRAZOLE SODIUM 40 MG/1
1 TABLET, DELAYED RELEASE ORAL DAILY
COMMUNITY
Start: 2022-12-27 | End: 2023-02-27

## 2023-02-27 RX ORDER — CYCLOBENZAPRINE HCL 10 MG
10 TABLET ORAL 3 TIMES DAILY PRN
Qty: 30 TABLET | Refills: 0 | Status: SHIPPED | OUTPATIENT
Start: 2023-02-27

## 2023-02-27 NOTE — PROGRESS NOTES
Follow Up Office Visit      Patient Name: Dee Grider  : 1960   MRN: [id number]     Chief Complaint:    Chief Complaint   Patient presents with   • Spasms     Right arm, shoulder and back. Been going on for about a week. Worse yesterday and today.       History of Present Illness: Dee Grider is a 62 y.o. female who is here today to follow up with left-sided neck and upper back pain is radiating down her left arm to the palm.  Patient says it is severe and unrelenting over the past several days.  Patient says that it has worsened over the last 2 days.  Patient denies any new injuries.  She has never had this pain before.  Patient says that she is having trouble walking and moving her arms.  Patient has not tried any anti-inflammatories.  She has tried Tylenol.  Patient has not been on muscle relaxers.  She has tried heating pad.  She says when it gets cold that it makes it worse.      Review of systems positive for back and neck pain    Physical exam: Patient is very tender to palpation of her thoracic spine and neck.  Patient has soft tissue swelling of the left trapezius muscle compared to right.  Patient has intact range of motion of left shoulder.  Somatic dysfunction noted in rib and thoracic region      Subjective        I have reviewed and the following portions of the patient's history were updated as appropriate: past family history, past medical history, past social history, past surgical history and problem list.    Medications:     Current Outpatient Medications:   •  acetaminophen (TYLENOL) 500 MG tablet, Take 500 mg by mouth Every 6 (Six) Hours As Needed., Disp: , Rfl:   •  atorvastatin (LIPITOR) 80 MG tablet, TAKE 1 TABLET BY MOUTH EVERY DAY, Disp: 90 tablet, Rfl: 0  •  Cetirizine HCl (ZYRTEC ALLERGY PO), Take  by mouth., Disp: , Rfl:   •  docusate sodium (COLACE) 100 MG capsule, Take 1 capsule by mouth 2 (Two) Times a Day., Disp: , Rfl:   •  metoprolol tartrate (LOPRESSOR) 50 MG  "tablet, TAKE 1 TABLET BY MOUTH TWICE A DAY, Disp: 180 tablet, Rfl: 1  •  nortriptyline (PAMELOR) 10 MG capsule, , Disp: , Rfl:   •  pantoprazole (PROTONIX) 40 MG EC tablet, TAKE 1 TABLET BY MOUTH EVERY DAY, Disp: 90 tablet, Rfl: 0  •  rizatriptan (MAXALT) 10 MG tablet, Take 10 mg by mouth 1 (One) Time As Needed., Disp: , Rfl:   •  cyclobenzaprine (FLEXERIL) 10 MG tablet, Take 1 tablet by mouth 3 (Three) Times a Day As Needed for Muscle Spasms., Disp: 30 tablet, Rfl: 0  •  diclofenac (VOLTAREN) 50 MG EC tablet, Take 1 tablet by mouth 2 (Two) Times a Day As Needed (pain)., Disp: 20 tablet, Rfl: 0  •  Diclofenac Sodium (VOLTAREN) 1 % gel gel, Apply 4 g topically to the appropriate area as directed 4 (Four) Times a Day As Needed (left back pain)., Disp: 100 g, Rfl: 1    Allergies:   Allergies   Allergen Reactions   • Aspirin Nausea And Vomiting       Objective     Physical Exam: Please see above  Vital Signs:   Vitals:    02/27/23 1421   BP: 126/80   Pulse: 78   Temp: 97.8 °F (36.6 °C)   SpO2: 96%   Weight: 100 kg (220 lb 6.4 oz)   Height: 157.5 cm (62.01\")   PainSc: 10-Worst pain ever     Body mass index is 40.3 kg/m².          Assessment / Plan      Assessment/Plan:   Diagnoses and all orders for this visit:    1. Upper back pain (Primary)  -     Diclofenac Sodium (VOLTAREN) 1 % gel gel; Apply 4 g topically to the appropriate area as directed 4 (Four) Times a Day As Needed (left back pain).  Dispense: 100 g; Refill: 1  -     Ambulatory Referral to Physical Therapy Evaluate and treat  -     diclofenac (VOLTAREN) 50 MG EC tablet; Take 1 tablet by mouth 2 (Two) Times a Day As Needed (pain).  Dispense: 20 tablet; Refill: 0  -     cyclobenzaprine (FLEXERIL) 10 MG tablet; Take 1 tablet by mouth 3 (Three) Times a Day As Needed for Muscle Spasms.  Dispense: 30 tablet; Refill: 0  -     XR spine thoracic 2 vw; Future    2. Cervical radiculopathy  -     XR Spine Cervical 2 or 3 View; Future    We get x-rays of spine given " patient's supreme tenderness today.  Referred to PT for treatment today.    Patient seems to have radicular symptoms coming from her neck or upper spine.  Her trapezius muscle is inflamed on exam.  She is very tender to palpation on her cervical and thoracic spinous processes.  X-rays ordered above for this issue.  We will try Flexeril, Voltaren gel and diclofenac.  She had issues with taking diclofenac previously so we may have to switch her to prednisone.  Patient will have to stop NSAIDs before we prescribe prednisone.  Patient should call in after 2 days of treatment to see if this helps.  Also recommend heating pad.  Follow-up.    Follow Up:   No follow-ups on file.    Wilton Purdy DO  [facility] [place]

## 2023-02-28 ENCOUNTER — TREATMENT (OUTPATIENT)
Dept: PHYSICAL THERAPY | Facility: CLINIC | Age: 63
End: 2023-02-28
Payer: MEDICARE

## 2023-02-28 DIAGNOSIS — M79.602 LEFT ARM PAIN: ICD-10-CM

## 2023-02-28 DIAGNOSIS — M54.12 RADICULOPATHY, CERVICAL: Primary | ICD-10-CM

## 2023-02-28 PROCEDURE — 97110 THERAPEUTIC EXERCISES: CPT | Performed by: PHYSICAL THERAPIST

## 2023-02-28 PROCEDURE — 97530 THERAPEUTIC ACTIVITIES: CPT | Performed by: PHYSICAL THERAPIST

## 2023-02-28 PROCEDURE — 97140 MANUAL THERAPY 1/> REGIONS: CPT | Performed by: PHYSICAL THERAPIST

## 2023-02-28 NOTE — PROGRESS NOTES
Physical Therapy Initial Evaluation and Plan of Care    [facility]  [address]  [place], [place] [address] [phone]    Patient: Dee Grider   : 1960  Diagnosis/ICD-10 Code:  Radiculopathy, cervical [M54.12]  Referring practitioner: Wiltno Purdy DO    Subjective Evaluation    History of Present Illness  Date of onset: 2023  Mechanism of injury: Insidious onset    Subjective comment: Had had chronic neck pain for years.  Has alos had left arm radiating pain and numbness that significantly worsened last Friday.  Saw a PCP today.  Thinks she has been prescribed muscle relaxers and an anti-inflammatory to  after she leaves.  Has also had severe pain in the left shoulder blade that wraps around the left anterior chest way.    Patient Occupation: Unemployed Quality of life: good    Patient Goals  Patient goals for therapy: decreased pain         NDI:  48%  Objective          Active Range of Motion   Cervical/Thoracic Spine   Cervical    Left rotation: 60 degrees   Right rotation: 60 degrees     Tests     Additional Tests Details  *(+) C/S distraction increased T/S and radiating pain into the L UE          Assessment & Plan     Assessment  Impairments: abnormal or restricted ROM, activity intolerance, lacks appropriate home exercise program and pain with function  Functional Limitations: carrying objects, lifting, sleeping, uncomfortable because of pain and moving in bed  Assessment details: Ms. Grider is a 62 year old female that presents to physical therapy with an acute on chronic pain in the cervical spine with radiating symptoms along the medial brachium and antebrachium pathways into the left hand.  Has a very severe episode of pain in the left scapular region.  Has normal blood pressure and heart rate based on the PCP visit just before seeing me.  I don't suspect cardiogenic pathology.  Based on previous chronicity of symptoms, this  appears to be a cervical spine radiculopathy affecting the C8/T1 pathway.  Prognosis: fair  Prognosis details: Based on chronicity of symptoms and severe pain today.    Goals  Plan Goals: STGs:  1.)   NDI improved x 1 MCID in 6 weeks.  2.)  Have no radiating pain in the left upper extremity in 6 weeks.  LTGs:  1.)  Have no left scapular pain in 8 weeks.  2.)  Be able to sleep without disturbance from neck or left arm pain in 8 weeks.    Plan  Therapy options: will be seen for skilled therapy services  Planned modality interventions: thermotherapy (hydrocollator packs) and cryotherapy  Planned therapy interventions: therapeutic activities, stretching, strengthening, manual therapy, abdominal trunk stabilization, functional ROM exercises and home exercise program  Frequency: 1x week  Duration in visits: 8  Duration in weeks: 8  Treatment plan discussed with: patient        Manual Therapy:    9     mins  57176;  Therapeutic Exercise:         mins  93463;     Neuromuscular Esther:        mins  95386;    Therapeutic Activity:          mins  02956;     Gait Training:           mins  28134;     Ultrasound:          mins  67821;    Electrical Stimulation:         mins  79564 ( );  Dry Needling          mins self-pay    Timed Treatment:   9   mins   Total Treatment:     30   mins    PT SIGNATURE: Hector Vivas, PT   DATE TREATMENT INITIATED: 2/27/2023    Initial Certification  Certification Period: 5/28/2023  I certify that the therapy services are furnished while this patient is under my care.  The services outlined above are required by this patient, and will be reviewed every 90 days.     PHYSICIAN: Wilton Purdy DO  NPI: [id number]                                      DATE:    Please sign and return via fax to [phone].. Thank you, [facility].

## 2023-03-01 ENCOUNTER — TELEPHONE (OUTPATIENT)
Dept: FAMILY MEDICINE CLINIC | Facility: CLINIC | Age: 63
End: 2023-03-01

## 2023-03-01 NOTE — TELEPHONE ENCOUNTER
Caller: Dee Grider    Relationship: Self    Best call back number: [phone]    What form or medical record are you requesting: X RAYS FROM 2/27/23    Who is requesting this form or medical record from you: [employer]     How would you like to receive the form or medical records (pick-up, mail, fax): FAX  If fax, what is the fax number: [phone]    Timeframe paperwork needed: AS SOON AS POSSIBLE. PATIENT WAS ASKED TO HAND DELIVER THESE BUT SHE IS UNABLE TO DRIVE RIGHT NOW.     Additional notes: PLEASE NOTIFY PATIENT WHEN THESE HAVE BEEN FAXED

## 2023-03-02 NOTE — TELEPHONE ENCOUNTER
I tried calling patient to tell her we faxed this but the phone rang and rang for about 5 minutes and then said your call cannot go through at this time.

## 2023-03-06 ENCOUNTER — TREATMENT (OUTPATIENT)
Dept: PHYSICAL THERAPY | Facility: CLINIC | Age: 63
End: 2023-03-06
Payer: MEDICARE

## 2023-03-06 DIAGNOSIS — M54.12 RADICULOPATHY, CERVICAL: Primary | ICD-10-CM

## 2023-03-06 DIAGNOSIS — M79.602 LEFT ARM PAIN: ICD-10-CM

## 2023-03-06 DIAGNOSIS — K21.00 CHRONIC REFLUX ESOPHAGITIS: ICD-10-CM

## 2023-03-06 PROCEDURE — 97110 THERAPEUTIC EXERCISES: CPT | Performed by: PHYSICAL THERAPIST

## 2023-03-06 PROCEDURE — 97140 MANUAL THERAPY 1/> REGIONS: CPT | Performed by: PHYSICAL THERAPIST

## 2023-03-06 RX ORDER — ATORVASTATIN CALCIUM 80 MG/1
TABLET, FILM COATED ORAL
Qty: 90 TABLET | Refills: 0 | Status: SHIPPED | OUTPATIENT
Start: 2023-03-06

## 2023-03-06 RX ORDER — PANTOPRAZOLE SODIUM 40 MG/1
TABLET, DELAYED RELEASE ORAL
Qty: 90 TABLET | Refills: 0 | Status: SHIPPED | OUTPATIENT
Start: 2023-03-06

## 2023-03-06 NOTE — TELEPHONE ENCOUNTER
Rx Refill Note  Requested Prescriptions     Pending Prescriptions Disp Refills   • atorvastatin (LIPITOR) 80 MG tablet [Pharmacy Med Name: ATORVASTATIN 80 MG TABLET] 90 tablet 0     Sig: TAKE 1 TABLET BY MOUTH EVERY DAY   • pantoprazole (PROTONIX) 40 MG EC tablet [Pharmacy Med Name: PANTOPRAZOLE SOD DR 40 MG TAB] 90 tablet 0     Sig: TAKE 1 TABLET BY MOUTH EVERY DAY      Last office visit with prescribing clinician: 10/21/2022   Last telemedicine visit with prescribing clinician: 4/21/2023   Next office visit with prescribing clinician: 4/21/2023                         Would you like a call back once the refill request has been completed: [] Yes [] No    If the office needs to give you a call back, can they leave a voicemail: [] Yes [] No    Wendy Marr MA  03/06/23, 17:46 EST

## 2023-03-06 NOTE — PROGRESS NOTES
Physical Therapy Daily Progress Note    Patient: Dee Grider   : 1960  Diagnosis/ICD-10 Code:  Radiculopathy, cervical [M54.12]  Referring practitioner: Wilton Purdy DO  Date of Initial Visit: Type: THERAPY  Noted: 2023  Today's Date: 3/6/2023  Patient seen for 3 sessions         Dee Grider reports less intensity of pain in the left arm and hand.  Still has same pain in the neck, left shoulder blade and arm.  Does not have as much radiating pain along the left flank.      Subjective     Objective   See Exercise, Manual, and Modality Logs for complete treatment.       Assessment/Plan  Continued emphasis on improving sensitivity along the lower C/S and L UE.  Combined with exercises to improve L UE pain via cervical spine AROM.    *Updated HEP with written and verbal instruction (included in total time billed as TE below).       Manual Therapy:    14     mins  25368;  Therapeutic Exercise:    10     mins  69021;     Neuromuscular Esther:        mins  30537;    Therapeutic Activity:          mins  53218;     Gait Training:           mins  85719;     Ultrasound:          mins  07270;    Electrical Stimulation:         mins  21213 ( );  Dry Needling          mins self-pay    Timed Treatment:   24   mins   Total Treatment:     24   mins    Hector Vivas PT  Physical Therapist

## 2023-03-14 DIAGNOSIS — M54.9 UPPER BACK PAIN: ICD-10-CM

## 2023-03-14 NOTE — TELEPHONE ENCOUNTER
Rx Refill Note  Requested Prescriptions     Pending Prescriptions Disp Refills   • Diclofenac Sodium (VOLTAREN) 1 % gel gel 100 g 1     Sig: Apply 4 g topically to the appropriate area as directed 4 (Four) Times a Day As Needed (left back pain).      Last office visit with prescribing clinician: 10/21/2022   Last telemedicine visit with prescribing clinician: 4/21/2023   Next office visit with prescribing clinician: 4/21/2023                         Would you like a call back once the refill request has been completed: [] Yes [] No    If the office needs to give you a call back, can they leave a voicemail: [] Yes [] No    Dian Sheridan MA  03/14/23, 10:23 EDT

## 2023-05-23 ENCOUNTER — LAB (OUTPATIENT)
Dept: LAB | Facility: HOSPITAL | Age: 63
End: 2023-05-23
Payer: MEDICARE

## 2023-05-23 ENCOUNTER — OFFICE VISIT (OUTPATIENT)
Dept: FAMILY MEDICINE CLINIC | Facility: CLINIC | Age: 63
End: 2023-05-23
Payer: MEDICARE

## 2023-05-23 VITALS
WEIGHT: 217 LBS | HEIGHT: 62 IN | SYSTOLIC BLOOD PRESSURE: 122 MMHG | OXYGEN SATURATION: 95 % | HEART RATE: 92 BPM | TEMPERATURE: 98 F | BODY MASS INDEX: 39.93 KG/M2 | DIASTOLIC BLOOD PRESSURE: 82 MMHG

## 2023-05-23 DIAGNOSIS — I10 PRIMARY HYPERTENSION: Primary | ICD-10-CM

## 2023-05-23 DIAGNOSIS — M70.62 GREATER TROCHANTERIC BURSITIS OF LEFT HIP: ICD-10-CM

## 2023-05-23 DIAGNOSIS — R73.03 PREDIABETES: ICD-10-CM

## 2023-05-23 DIAGNOSIS — I10 PRIMARY HYPERTENSION: ICD-10-CM

## 2023-05-23 DIAGNOSIS — E78.01 FAMILIAL HYPERCHOLESTEROLEMIA: ICD-10-CM

## 2023-05-23 PROBLEM — G25.0 HEREDITARY ESSENTIAL TREMOR: Status: ACTIVE | Noted: 2022-02-01

## 2023-05-23 PROBLEM — M79.7 FIBROMYALGIA: Status: ACTIVE | Noted: 2023-01-09

## 2023-05-23 PROBLEM — G44.86 CERVICOGENIC HEADACHE: Status: ACTIVE | Noted: 2022-02-01

## 2023-05-23 PROBLEM — H81.10 BENIGN PAROXYSMAL POSITIONAL VERTIGO: Status: ACTIVE | Noted: 2022-02-01

## 2023-05-23 PROBLEM — F41.9 ANXIETY: Status: ACTIVE | Noted: 2021-10-08

## 2023-05-23 LAB
ALBUMIN SERPL-MCNC: 4.4 G/DL (ref 3.5–5.2)
ALBUMIN/GLOB SERPL: 1.6 G/DL
ALP SERPL-CCNC: 102 U/L (ref 39–117)
ALT SERPL W P-5'-P-CCNC: 15 U/L (ref 1–33)
ANION GAP SERPL CALCULATED.3IONS-SCNC: 9 MMOL/L (ref 5–15)
AST SERPL-CCNC: 19 U/L (ref 1–32)
BASOPHILS # BLD AUTO: 0.08 10*3/MM3 (ref 0–0.2)
BASOPHILS NFR BLD AUTO: 0.8 % (ref 0–1.5)
BILIRUB SERPL-MCNC: 0.3 MG/DL (ref 0–1.2)
BUN SERPL-MCNC: 9 MG/DL (ref 8–23)
BUN/CREAT SERPL: 16.1 (ref 7–25)
CALCIUM SPEC-SCNC: 9.3 MG/DL (ref 8.6–10.5)
CHLORIDE SERPL-SCNC: 103 MMOL/L (ref 98–107)
CHOLEST SERPL-MCNC: 159 MG/DL (ref 0–200)
CO2 SERPL-SCNC: 29 MMOL/L (ref 22–29)
CREAT SERPL-MCNC: 0.56 MG/DL (ref 0.57–1)
DEPRECATED RDW RBC AUTO: 44 FL (ref 37–54)
EGFRCR SERPLBLD CKD-EPI 2021: 104 ML/MIN/1.73
EOSINOPHIL # BLD AUTO: 0.51 10*3/MM3 (ref 0–0.4)
EOSINOPHIL NFR BLD AUTO: 4.8 % (ref 0.3–6.2)
ERYTHROCYTE [DISTWIDTH] IN BLOOD BY AUTOMATED COUNT: 13 % (ref 12.3–15.4)
GLOBULIN UR ELPH-MCNC: 2.8 GM/DL
GLUCOSE SERPL-MCNC: 89 MG/DL (ref 65–99)
HBA1C MFR BLD: 6.3 % (ref 4.8–5.6)
HCT VFR BLD AUTO: 39.8 % (ref 34–46.6)
HDLC SERPL-MCNC: 46 MG/DL (ref 40–60)
HGB BLD-MCNC: 13.2 G/DL (ref 12–15.9)
IMM GRANULOCYTES # BLD AUTO: 0.08 10*3/MM3 (ref 0–0.05)
IMM GRANULOCYTES NFR BLD AUTO: 0.8 % (ref 0–0.5)
LDLC SERPL CALC-MCNC: 89 MG/DL (ref 0–100)
LDLC/HDLC SERPL: 1.88 {RATIO}
LYMPHOCYTES # BLD AUTO: 3.9 10*3/MM3 (ref 0.7–3.1)
LYMPHOCYTES NFR BLD AUTO: 36.7 % (ref 19.6–45.3)
MCH RBC QN AUTO: 30.6 PG (ref 26.6–33)
MCHC RBC AUTO-ENTMCNC: 33.2 G/DL (ref 31.5–35.7)
MCV RBC AUTO: 92.1 FL (ref 79–97)
MONOCYTES # BLD AUTO: 0.52 10*3/MM3 (ref 0.1–0.9)
MONOCYTES NFR BLD AUTO: 4.9 % (ref 5–12)
NEUTROPHILS NFR BLD AUTO: 5.53 10*3/MM3 (ref 1.7–7)
NEUTROPHILS NFR BLD AUTO: 52 % (ref 42.7–76)
NRBC BLD AUTO-RTO: 0 /100 WBC (ref 0–0.2)
PLATELET # BLD AUTO: 273 10*3/MM3 (ref 140–450)
PMV BLD AUTO: 10.4 FL (ref 6–12)
POTASSIUM SERPL-SCNC: 4.4 MMOL/L (ref 3.5–5.2)
PROT SERPL-MCNC: 7.2 G/DL (ref 6–8.5)
RBC # BLD AUTO: 4.32 10*6/MM3 (ref 3.77–5.28)
SODIUM SERPL-SCNC: 141 MMOL/L (ref 136–145)
TRIGL SERPL-MCNC: 133 MG/DL (ref 0–150)
VLDLC SERPL-MCNC: 24 MG/DL (ref 5–40)
WBC NRBC COR # BLD: 10.62 10*3/MM3 (ref 3.4–10.8)

## 2023-05-23 PROCEDURE — 80061 LIPID PANEL: CPT

## 2023-05-23 PROCEDURE — 83036 HEMOGLOBIN GLYCOSYLATED A1C: CPT

## 2023-05-23 PROCEDURE — 1160F RVW MEDS BY RX/DR IN RCRD: CPT | Performed by: FAMILY MEDICINE

## 2023-05-23 PROCEDURE — 3079F DIAST BP 80-89 MM HG: CPT | Performed by: FAMILY MEDICINE

## 2023-05-23 PROCEDURE — 1159F MED LIST DOCD IN RCRD: CPT | Performed by: FAMILY MEDICINE

## 2023-05-23 PROCEDURE — 3074F SYST BP LT 130 MM HG: CPT | Performed by: FAMILY MEDICINE

## 2023-05-23 PROCEDURE — 85025 COMPLETE CBC W/AUTO DIFF WBC: CPT

## 2023-05-23 PROCEDURE — 80053 COMPREHEN METABOLIC PANEL: CPT

## 2023-05-23 PROCEDURE — 99214 OFFICE O/P EST MOD 30 MIN: CPT | Performed by: FAMILY MEDICINE

## 2023-05-23 RX ORDER — ATORVASTATIN CALCIUM 80 MG/1
80 TABLET, FILM COATED ORAL DAILY
Qty: 90 TABLET | Refills: 3 | Status: SHIPPED | OUTPATIENT
Start: 2023-05-23

## 2023-05-23 RX ORDER — METHYLPREDNISOLONE 4 MG/1
TABLET ORAL
Qty: 21 TABLET | Refills: 0 | Status: SHIPPED | OUTPATIENT
Start: 2023-05-23 | End: 2023-05-28

## 2023-05-23 NOTE — PROGRESS NOTES
Dee Grider is a 63 y.o. female who presents today for Hypertension, Hyperlipidemia, and Prediabetes      Patient is here to follow-up on HTN, HLD, and prediabetes. She does not check her blood pressure at home but has been taking metoprolol as directed. She has been taking statin as directed. She has not had any side effects from medications. She has not been exercising. She eats a lot of fruit but not many vegetables. Most meals are home cooked mexican food consisting of rice beans and a protein. Patient has chronic pain in the lateral right hip that has not responded to PT. She was told she has bursitis but it has not been treated effectively. She does not have transportation for ortho at this time.        Review of Systems   Constitutional: Negative for fever and unexpected weight loss.   HENT: Negative for congestion, ear pain and sore throat.    Eyes: Negative for visual disturbance.   Respiratory: Negative for cough, shortness of breath and wheezing.    Cardiovascular: Negative for chest pain and palpitations.   Gastrointestinal: Negative for abdominal pain, blood in stool, constipation, diarrhea, nausea, vomiting and GERD.   Endocrine: Negative for polydipsia and polyuria.   Genitourinary: Negative for difficulty urinating.   Musculoskeletal: Positive for arthralgias (left hip pain), back pain (chronic) and neck pain (chronic). Negative for joint swelling.   Skin: Negative for rash and skin lesions.   Allergic/Immunologic: Negative for environmental allergies.   Neurological: Negative for seizures and syncope.   Hematological: Does not bruise/bleed easily.   Psychiatric/Behavioral: Negative for suicidal ideas.        The following portions of the patient's history were reviewed and updated as appropriate: allergies, current medications, past family history, past medical history, past social history, past surgical history and problem list.    Current Outpatient Medications on File Prior to Visit  "  Medication Sig Dispense Refill   • acetaminophen (TYLENOL) 500 MG tablet Take 1 tablet by mouth Every 6 (Six) Hours As Needed.     • Cetirizine HCl (ZYRTEC ALLERGY PO) Take  by mouth.     • cyclobenzaprine (FLEXERIL) 10 MG tablet Take 1 tablet by mouth 3 (Three) Times a Day As Needed for Muscle Spasms. 30 tablet 0   • Diclofenac Sodium (VOLTAREN) 1 % gel gel Apply 4 g topically to the appropriate area as directed 4 (Four) Times a Day As Needed (left back pain). 100 g 1   • docusate sodium (COLACE) 100 MG capsule Take 1 capsule by mouth 2 (Two) Times a Day.     • metoprolol tartrate (LOPRESSOR) 50 MG tablet TAKE 1 TABLET BY MOUTH TWICE A  tablet 1   • nortriptyline (PAMELOR) 10 MG capsule      • pantoprazole (PROTONIX) 40 MG EC tablet TAKE 1 TABLET BY MOUTH EVERY DAY 90 tablet 0   • rizatriptan (MAXALT) 10 MG tablet Take 1 tablet by mouth 1 (One) Time As Needed.     • [DISCONTINUED] atorvastatin (LIPITOR) 80 MG tablet TAKE 1 TABLET BY MOUTH EVERY DAY 90 tablet 0   • [DISCONTINUED] diclofenac (VOLTAREN) 50 MG EC tablet Take 1 tablet by mouth 2 (Two) Times a Day As Needed (pain). 20 tablet 0     No current facility-administered medications on file prior to visit.       Allergies   Allergen Reactions   • Aspirin Nausea And Vomiting   • Sulindac Other (See Comments)        Visit Vitals  /82   Pulse 92   Temp 98 °F (36.7 °C)   Ht 157.5 cm (62.01\")   Wt 98.4 kg (217 lb)   SpO2 95%   BMI 39.68 kg/m²        Physical Exam  Constitutional:       General: She is not in acute distress.     Appearance: She is well-developed. She is not diaphoretic.   HENT:      Head: Atraumatic.   Cardiovascular:      Rate and Rhythm: Normal rate and regular rhythm.      Heart sounds: Murmur (chronic) heard.     No friction rub. No gallop.   Pulmonary:      Effort: Pulmonary effort is normal. No respiratory distress.      Breath sounds: Normal breath sounds. No stridor. No wheezing, rhonchi or rales.   Musculoskeletal:      " Cervical back: Normal range of motion and neck supple.      Right hip: Normal.      Left hip: Tenderness (tenderness over greater trochanter) present.   Skin:     General: Skin is warm and dry.   Neurological:      Mental Status: She is alert and oriented to person, place, and time.   Psychiatric:         Behavior: Behavior normal.          Results for orders placed or performed in visit on 10/21/22   Comprehensive Metabolic Panel    Specimen: Blood   Result Value Ref Range    Glucose 89 65 - 99 mg/dL    BUN 8 8 - 23 mg/dL    Creatinine 0.66 0.57 - 1.00 mg/dL    Sodium 140 136 - 145 mmol/L    Potassium 4.2 3.5 - 5.2 mmol/L    Chloride 102 98 - 107 mmol/L    CO2 26.7 22.0 - 29.0 mmol/L    Calcium 9.7 8.6 - 10.5 mg/dL    Total Protein 7.2 6.0 - 8.5 g/dL    Albumin 4.30 3.50 - 5.20 g/dL    ALT (SGPT) 17 1 - 33 U/L    AST (SGOT) 17 1 - 32 U/L    Alkaline Phosphatase 112 39 - 117 U/L    Total Bilirubin 0.3 0.0 - 1.2 mg/dL    Globulin 2.9 gm/dL    A/G Ratio 1.5 g/dL    BUN/Creatinine Ratio 12.1 7.0 - 25.0    Anion Gap 11.3 5.0 - 15.0 mmol/L    eGFR 100.6 >60.0 mL/min/1.73   Lipid Panel    Specimen: Blood   Result Value Ref Range    Total Cholesterol 178 0 - 200 mg/dL    Triglycerides 132 0 - 150 mg/dL    HDL Cholesterol 49 40 - 60 mg/dL    LDL Cholesterol  106 (H) 0 - 100 mg/dL    VLDL Cholesterol 23 5 - 40 mg/dL    LDL/HDL Ratio 2.09    Hemoglobin A1c    Specimen: Blood   Result Value Ref Range    Hemoglobin A1C 6.10 (H) 4.80 - 5.60 %   CBC Auto Differential    Specimen: Blood   Result Value Ref Range    WBC 10.64 3.40 - 10.80 10*3/mm3    RBC 4.53 3.77 - 5.28 10*6/mm3    Hemoglobin 13.8 12.0 - 15.9 g/dL    Hematocrit 41.7 34.0 - 46.6 %    MCV 92.1 79.0 - 97.0 fL    MCH 30.5 26.6 - 33.0 pg    MCHC 33.1 31.5 - 35.7 g/dL    RDW 13.3 12.3 - 15.4 %    RDW-SD 45.0 37.0 - 54.0 fl    MPV 10.3 6.0 - 12.0 fL    Platelets 282 140 - 450 10*3/mm3    Neutrophil % 49.6 42.7 - 76.0 %    Lymphocyte % 39.8 19.6 - 45.3 %    Monocyte % 4.6  (L) 5.0 - 12.0 %    Eosinophil % 5.0 0.3 - 6.2 %    Basophil % 0.7 0.0 - 1.5 %    Immature Grans % 0.3 0.0 - 0.5 %    Neutrophils, Absolute 5.29 1.70 - 7.00 10*3/mm3    Lymphocytes, Absolute 4.23 (H) 0.70 - 3.10 10*3/mm3    Monocytes, Absolute 0.49 0.10 - 0.90 10*3/mm3    Eosinophils, Absolute 0.53 (H) 0.00 - 0.40 10*3/mm3    Basophils, Absolute 0.07 0.00 - 0.20 10*3/mm3    Immature Grans, Absolute 0.03 0.00 - 0.05 10*3/mm3    nRBC 0.1 0.0 - 0.2 /100 WBC        Problems Addressed this Visit        Cardiac and Vasculature    HTN (hypertension) - Primary     Hypertension is improving with treatment.  Continue current treatment regimen.  Blood pressure will be reassessed at the next regular appointment.         Relevant Orders    Comprehensive Metabolic Panel    CBC & Differential    Lipid Panel    Familial hypercholesterolemia     Lipid abnormalities are improving with treatment.  Pharmacotherapy as ordered.  Lipids will be reassessed in 6 months.         Relevant Medications    atorvastatin (LIPITOR) 80 MG tablet    Other Relevant Orders    Comprehensive Metabolic Panel    CBC & Differential    Lipid Panel       Endocrine and Metabolic    Prediabetes     A1c has been stable.  Patient will work on diet and exercise.  We will recheck A1c today.         Relevant Orders    Comprehensive Metabolic Panel    CBC & Differential    Lipid Panel    Hemoglobin A1c       Musculoskeletal and Injuries    Greater trochanteric bursitis of left hip     Left hip pain consistent with greater trochanteric bursitis.  Patient does not have transportation to physical therapy or for an orthopedic surgery referral at this time.  We will give her a steroid Dosepak and she can continue to take Tylenol as needed for pain.  RTC/ED precautions given.         Relevant Medications    methylPREDNISolone (MEDROL) 4 MG dose pack   Diagnoses       Codes Comments    Primary hypertension    -  Primary ICD-10-CM: I10  ICD-9-CM: 401.9     Familial  hypercholesterolemia     ICD-10-CM: E78.01  ICD-9-CM: 272.0     Prediabetes     ICD-10-CM: R73.03  ICD-9-CM: 790.29     Greater trochanteric bursitis of left hip     ICD-10-CM: M70.62  ICD-9-CM: 726.5           Return if symptoms worsen or fail to improve.    Sebastian Pal MD   5/23/2023

## 2023-05-23 NOTE — ASSESSMENT & PLAN NOTE
Left hip pain consistent with greater trochanteric bursitis.  Patient does not have transportation to physical therapy or for an orthopedic surgery referral at this time.  We will give her a steroid Dosepak and she can continue to take Tylenol as needed for pain.  RTC/ED precautions given.

## 2023-05-23 NOTE — PATIENT INSTRUCTIONS
"Hypertension, Adult  High blood pressure (hypertension) is when the force of blood pumping through the arteries is too strong. The arteries are the blood vessels that carry blood from the heart throughout the body. Hypertension forces the heart to work harder to pump blood and may cause arteries to become narrow or stiff. Untreated or uncontrolled hypertension can cause a heart attack, heart failure, a stroke, kidney disease, and other problems.  A blood pressure reading consists of a higher number over a lower number. Ideally, your blood pressure should be below 120/80. The first (\"top\") number is called the systolic pressure. It is a measure of the pressure in your arteries as your heart beats. The second (\"bottom\") number is called the diastolic pressure. It is a measure of the pressure in your arteries as the heart relaxes.  What are the causes?  The exact cause of this condition is not known. There are some conditions that result in or are related to high blood pressure.  What increases the risk?  Some risk factors for high blood pressure are under your control. The following factors may make you more likely to develop this condition:  Smoking.  Having type 2 diabetes mellitus, high cholesterol, or both.  Not getting enough exercise or physical activity.  Being overweight.  Having too much fat, sugar, calories, or salt (sodium) in your diet.  Drinking too much alcohol.  Some risk factors for high blood pressure may be difficult or impossible to change. Some of these factors include:  Having chronic kidney disease.  Having a family history of high blood pressure.  Age. Risk increases with age.  Race. You may be at higher risk if you are .  Gender. Men are at higher risk than women before age 45. After age 65, women are at higher risk than men.  Having obstructive sleep apnea.  Stress.  What are the signs or symptoms?  High blood pressure may not cause symptoms. Very high blood pressure " (hypertensive crisis) may cause:  Headache.  Anxiety.  Shortness of breath.  Nosebleed.  Nausea and vomiting.  Vision changes.  Severe chest pain.  Seizures.  How is this diagnosed?  This condition is diagnosed by measuring your blood pressure while you are seated, with your arm resting on a flat surface, your legs uncrossed, and your feet flat on the floor. The cuff of the blood pressure monitor will be placed directly against the skin of your upper arm at the level of your heart. It should be measured at least twice using the same arm. Certain conditions can cause a difference in blood pressure between your right and left arms.  Certain factors can cause blood pressure readings to be lower or higher than normal for a short period of time:  When your blood pressure is higher when you are in a health care provider's office than when you are at home, this is called white coat hypertension. Most people with this condition do not need medicines.  When your blood pressure is higher at home than when you are in a health care provider's office, this is called masked hypertension. Most people with this condition may need medicines to control blood pressure.  If you have a high blood pressure reading during one visit or you have normal blood pressure with other risk factors, you may be asked to:  Return on a different day to have your blood pressure checked again.  Monitor your blood pressure at home for 1 week or longer.  If you are diagnosed with hypertension, you may have other blood or imaging tests to help your health care provider understand your overall risk for other conditions.  How is this treated?  This condition is treated by making healthy lifestyle changes, such as eating healthy foods, exercising more, and reducing your alcohol intake. Your health care provider may prescribe medicine if lifestyle changes are not enough to get your blood pressure under control, and if:  Your systolic blood pressure is above  130.  Your diastolic blood pressure is above 80.  Your personal target blood pressure may vary depending on your medical conditions, your age, and other factors.  Follow these instructions at home:  Eating and drinking    Eat a diet that is high in fiber and potassium, and low in sodium, added sugar, and fat. An example eating plan is called the DASH (Dietary Approaches to Stop Hypertension) diet. To eat this way:  Eat plenty of fresh fruits and vegetables. Try to fill one half of your plate at each meal with fruits and vegetables.  Eat whole grains, such as whole-wheat pasta, brown rice, or whole-grain bread. Fill about one fourth of your plate with whole grains.  Eat or drink low-fat dairy products, such as skim milk or low-fat yogurt.  Avoid fatty cuts of meat, processed or cured meats, and poultry with skin. Fill about one fourth of your plate with lean proteins, such as fish, chicken without skin, beans, eggs, or tofu.  Avoid pre-made and processed foods. These tend to be higher in sodium, added sugar, and fat.  Reduce your daily sodium intake. Most people with hypertension should eat less than 1,500 mg of sodium a day.  Do not drink alcohol if:  Your health care provider tells you not to drink.  You are pregnant, may be pregnant, or are planning to become pregnant.  If you drink alcohol:  Limit how much you use to:  0-1 drink a day for women.  0-2 drinks a day for men.  Be aware of how much alcohol is in your drink. In the [place] one drink equals one 12 oz bottle of beer (355 mL), one 5 oz glass of wine (148 mL), or one 1½ oz glass of hard liquor (44 mL).  Lifestyle    Work with your health care provider to maintain a healthy body weight or to lose weight. Ask what an ideal weight is for you.  Get at least 30 minutes of exercise most days of the week. Activities may include walking, swimming, or biking.  Include exercise to strengthen your muscles (resistance exercise), such as Pilates or lifting weights, as  part of your weekly exercise routine. Try to do these types of exercises for 30 minutes at least 3 days a week.  Do not use any products that contain nicotine or tobacco, such as cigarettes, e-cigarettes, and chewing tobacco. If you need help quitting, ask your health care provider.  Monitor your blood pressure at home as told by your health care provider.  Keep all follow-up visits as told by your health care provider. This is important.  Medicines  Take over-the-counter and prescription medicines only as told by your health care provider. Follow directions carefully. Blood pressure medicines must be taken as prescribed.  Do not skip doses of blood pressure medicine. Doing this puts you at risk for problems and can make the medicine less effective.  Ask your health care provider about side effects or reactions to medicines that you should watch for.  Contact a health care provider if you:  Think you are having a reaction to a medicine you are taking.  Have headaches that keep coming back (recurring).  Feel dizzy.  Have swelling in your ankles.  Have trouble with your vision.  Get help right away if you:  Develop a severe headache or confusion.  Have unusual weakness or numbness.  Feel faint.  Have severe pain in your chest or abdomen.  Vomit repeatedly.  Have trouble breathing.  Summary  Hypertension is when the force of blood pumping through your arteries is too strong. If this condition is not controlled, it may put you at risk for serious complications.  Your personal target blood pressure may vary depending on your medical conditions, your age, and other factors. For most people, a normal blood pressure is less than 120/80.  Hypertension is treated with lifestyle changes, medicines, or a combination of both. Lifestyle changes include losing weight, eating a healthy, low-sodium diet, exercising more, and limiting alcohol.  This information is not intended to replace advice given to you by your health care  "provider. Make sure you discuss any questions you have with your health care provider.  Document Revised: 08/28/2019 Document Reviewed: 08/28/2019  [employer] Patient Education © 2022 [employer]  BMI for Adults  What is BMI?  Body mass index (BMI) is a number that is calculated from a person's weight and height. BMI can help estimate how much of a person's weight is composed of fat. BMI does not measure body fat directly. Rather, it is an alternative to procedures that directly measure body fat, which can be difficult and expensive.  BMI can help identify people who may be at higher risk for certain medical problems.  What are BMI measurements used for?  BMI is used as a screening tool to identify possible weight problems. It helps determine whether a person is obese, overweight, a healthy weight, or underweight.  BMI is useful for:  Identifying a weight problem that may be related to a medical condition or may increase the risk for medical problems.  Promoting changes, such as changes in diet and exercise, to help reach a healthy weight. BMI screening can be repeated to see if these changes are working.  How is BMI calculated?  BMI involves measuring your weight in relation to your height. Both height and weight are measured, and the BMI is calculated from those numbers. This can be done either in English ([place] or metric measurements. Note that charts and online BMI calculators are available to help you find your BMI quickly and easily without having to do these calculations yourself.  To calculate your BMI in English ([place] measurements:    Measure your weight in pounds (lb).  Multiply the number of pounds by 703.  For example, for a person who weighs 180 lb, multiply that number by 703, which equals 126,540.  Measure your height in inches. Then multiply that number by itself to get a measurement called \"inches squared.\"  For example, for a person who is 70 inches tall, the \"inches squared\" measurement is 70 " "inches x 70 inches, which equals 4,900 inches squared.  Divide the total from step 2 (number of lb x 703) by the total from step 3 (inches squared): 126,540 ÷ 4,900 = 25.8. This is your BMI.  To calculate your BMI in metric measurements:  Measure your weight in kilograms (kg).  Measure your height in meters (m). Then multiply that number by itself to get a measurement called \"meters squared.\"  For example, for a person who is 1.75 m tall, the \"meters squared\" measurement is 1.75 m x 1.75 m, which is equal to 3.1 meters squared.  Divide the number of kilograms (your weight) by the meters squared number. In this example: 70 ÷ 3.1 = 22.6. This is your BMI.  What do the results mean?  BMI charts are used to identify whether you are underweight, normal weight, overweight, or obese. The following guidelines will be used:  Underweight: BMI less than 18.5.  Normal weight: BMI between 18.5 and 24.9.  Overweight: BMI between 25 and 29.9.  Obese: BMI of 30 or above.  Keep these notes in mind:  Weight includes both fat and muscle, so someone with a muscular build, such as an athlete, may have a BMI that is higher than 24.9. In cases like these, BMI is not an accurate measure of body fat.  To determine if excess body fat is the cause of a BMI of 25 or higher, further assessments may need to be done by a health care provider.  BMI is usually interpreted in the same way for men and women.  Where to find more information  For more information about BMI, including tools to quickly calculate your BMI, go to these websites:  Centers for Disease Control and Prevention: www.cdc.gov  American Heart Association: www.heart.org  National Heart, Lung, and Blood [place]: www.nhlbi.nih.gov  Summary  Body mass index (BMI) is a number that is calculated from a person's weight and height.  BMI may help estimate how much of a person's weight is composed of fat. BMI can help identify those who may be at higher risk for certain medical " problems.  BMI can be measured using English measurements or metric measurements.  BMI charts are used to identify whether you are underweight, normal weight, overweight, or obese.  This information is not intended to replace advice given to you by your health care provider. Make sure you discuss any questions you have with your health care provider.  Document Revised: 09/09/2020 Document Reviewed: 07/17/2020  [employer] Patient Education © 2022 [employer]

## 2023-05-25 ENCOUNTER — TELEPHONE (OUTPATIENT)
Dept: FAMILY MEDICINE CLINIC | Facility: CLINIC | Age: 63
End: 2023-05-25
Payer: MEDICARE

## 2023-05-25 NOTE — TELEPHONE ENCOUNTER
----- Message from Sebastian Pal MD sent at 5/24/2023  4:59 PM EDT -----  Please let the patient know that labs that were drawn at previous visit were stable. Patient should continue current treatment plan.  If patient has any questions they can contact me.

## 2023-06-16 DIAGNOSIS — K21.00 CHRONIC REFLUX ESOPHAGITIS: ICD-10-CM

## 2023-06-16 RX ORDER — PANTOPRAZOLE SODIUM 40 MG/1
TABLET, DELAYED RELEASE ORAL
Qty: 90 TABLET | Refills: 0 | Status: SHIPPED | OUTPATIENT
Start: 2023-06-16

## 2023-06-17 DIAGNOSIS — I10 ESSENTIAL HYPERTENSION: ICD-10-CM

## 2023-06-19 RX ORDER — METOPROLOL TARTRATE 50 MG/1
TABLET, FILM COATED ORAL
Qty: 180 TABLET | Refills: 1 | Status: SHIPPED | OUTPATIENT
Start: 2023-06-19

## 2023-09-12 DIAGNOSIS — K21.00 CHRONIC REFLUX ESOPHAGITIS: ICD-10-CM

## 2023-09-12 RX ORDER — PANTOPRAZOLE SODIUM 40 MG/1
TABLET, DELAYED RELEASE ORAL
Qty: 90 TABLET | Refills: 0 | Status: SHIPPED | OUTPATIENT
Start: 2023-09-12

## 2023-10-05 NOTE — PROGRESS NOTES
[facility]    Encounter Date: 10/06/2023    Patient ID: Dee Grider is a 63 y.o. female.  : 1960     PCP: Sebastian Pal MD       Chief Complaint: Essential hypertension (1-year FU)      PROBLEM LIST:  Cardiac murmur:  Echo, 2018: EF 60%. LV diastolic dysfunction (grade I). Mild AI.   Echo, 2021: EF 60%. Trace MR. Mild AI. Mild TR with normal RVSP.   Palpitations:  Consistent with awareness of exertional sinus tachycardia  Improved with increased dose of metoprolol.  Hypertension.  Familial hypercholesterolemia.  Morbid obesity.  Tobacco abuse.  MANUEL.  Pre-diabetes.  Seasonal allergies.  GERD.    History of Present Illness  Patient presents today for a one year follow-up with a history of palpitations and cardiac risk factors. Since last visit, patient has been doing well overall from a cardiovascular standpoint. She stays busy and active by caring for her mother. Patient reports she has been noticing ankle edema mostly her right leg due to an injury in the past. She denies chest pain, shortness of breath, orthopnea, palpitations, dizziness, and syncope.         Allergies   Allergen Reactions    Aspirin Nausea And Vomiting    Sulindac Other (See Comments)         Current Outpatient Medications:     acetaminophen (TYLENOL) 500 MG tablet, Take 1 tablet by mouth Every 6 (Six) Hours As Needed., Disp: , Rfl:     atorvastatin (LIPITOR) 80 MG tablet, Take 1 tablet by mouth Daily., Disp: 90 tablet, Rfl: 3    docusate sodium (COLACE) 100 MG capsule, Take 1 capsule by mouth 2 (Two) Times a Day., Disp: , Rfl:     metoprolol tartrate (LOPRESSOR) 50 MG tablet, TAKE 1 TABLET BY MOUTH TWICE A DAY, Disp: 180 tablet, Rfl: 1    nortriptyline (PAMELOR) 10 MG capsule, Take 1 capsule by mouth 3 (Three) Times a Day., Disp: , Rfl:     pantoprazole (PROTONIX) 40 MG EC tablet, TAKE 1 TABLET BY MOUTH EVERY DAY, Disp: 90 tablet, Rfl: 0    rizatriptan (MAXALT) 10 MG tablet,  "Take 1 tablet by mouth 1 (One) Time As Needed., Disp: , Rfl:     Cetirizine HCl (ZYRTEC ALLERGY PO), Take  by mouth. (Patient not taking: Reported on 10/6/2023), Disp: , Rfl:     cyclobenzaprine (FLEXERIL) 10 MG tablet, Take 1 tablet by mouth 3 (Three) Times a Day As Needed for Muscle Spasms. (Patient not taking: Reported on 10/6/2023), Disp: 30 tablet, Rfl: 0    Diclofenac Sodium (VOLTAREN) 1 % gel gel, Apply 4 g topically to the appropriate area as directed 4 (Four) Times a Day As Needed (left back pain). (Patient not taking: Reported on 10/6/2023), Disp: 100 g, Rfl: 1    The following portions of the patient's history were reviewed and updated as appropriate: allergies, current medications, past family history, past medical history, past social history, past surgical history and problem list.    ROS  Review of Systems   14 point ROS negative except for that listed in the HPI.         Objective:     /82 (BP Location: Left arm, Patient Position: Sitting, Cuff Size: Adult)   Pulse 96   Ht 157.5 cm (62\")   Wt 98.1 kg (216 lb 3.2 oz)   SpO2 94%   BMI 39.54 kg/m²      Physical Exam  Constitutional: Patient appears well-developed and well-nourished.   HENT: HEENT exam unremarkable.   Neck: Neck supple. No JVD present. No carotid bruits.   Cardiovascular: Normal rate, regular rhythm and normal heart sounds. 2/6 systolic murmur.   2+ symmetric pulses.   Pulmonary/Chest: Breath sounds normal. Does not exhibit tenderness.   Abdominal: Abdomen benign.   Musculoskeletal: Trace edema bilaterally.  Neurological: Neurological exam unremarkable.   Vitals reviewed.    Data Review:   Lab Results   Component Value Date    GLUCOSE 89 05/23/2023    BUN 9 05/23/2023    CREATININE 0.56 (L) 05/23/2023    EGFR 104.0 05/23/2023    BCR 16.1 05/23/2023     05/23/2023    K 4.4 05/23/2023    CO2 29.0 05/23/2023    CALCIUM 9.3 05/23/2023    ALBUMIN 4.4 05/23/2023    AST 19 05/23/2023    ALT 15 05/23/2023     Lab Results "   Component Value Date    CHOL 159 05/23/2023    TRIG 133 05/23/2023    HDL 46 05/23/2023    LDL 89 05/23/2023      Lab Results   Component Value Date    WBC 10.62 05/23/2023    RBC 4.32 05/23/2023    HGB 13.2 05/23/2023    HCT 39.8 05/23/2023    MCV 92.1 05/23/2023     05/23/2023     Lab Results   Component Value Date    HGBA1C 6.30 (H) 05/23/2023          ECG 12 Lead    Date/Time: 10/6/2023 10:59 AM  Performed by: Benjamín Martinez MD  Authorized by: Benjamín Martinez MD   Comparison: compared with previous ECG from 3/18/2022  Comparison to previous ECG: Right axis deviation  Abnormal ECG  Rhythm: sinus rhythm  BPM: 96  QRS axis: right    Clinical impression: abnormal EKG       Assessment:      Diagnosis Plan   1. Essential hypertension  Well controlled. 120/82 mmHg today. Continue on metoprolol 50 mg BID for rate control and hypertension.    2. Familial hypercholesterolemia  Well controlled. Last LDL 89 on 05/23/2023. Continue on atorvastatin 80 mg daily for hyperlipidemia.    3. Palpitations  No recent episode.  Continue metoprolol.   4.       Occasional trace ankle edema     Plan:   Stable cardiac status.  No angina or CHF symptoms.  Patient encouraged to wear compression stocking and reduce salt in her diet to reduce edema.   With normal blood pressure and no significant edema we discussed that risk/benefit of diuretic therapy not favorable.  If edema worsens she is advised to contact us for further recommendations.  Continue current medications.   FU in 12 MO, sooner as needed.  Thank you for allowing us to participate in the care of your patient.       Scribed for Benjamín Martinez MD by Leonides Smith. 10/6/2023 10:54 EDT    I, Benjamín Martinez MD, personally performed the services described in this documentation as scribed by the above named individual in my presence, and it is both accurate and complete.  10/7/2023  13:34 EDT      Please note that portions of this note may have been completed with a voice recognition  program. Efforts were made to edit the dictations, but occasionally words are mistranscribed.

## 2023-10-06 ENCOUNTER — OFFICE VISIT (OUTPATIENT)
Dept: CARDIOLOGY | Facility: CLINIC | Age: 63
End: 2023-10-06
Payer: MEDICARE

## 2023-10-06 VITALS
HEART RATE: 96 BPM | HEIGHT: 62 IN | WEIGHT: 216.2 LBS | OXYGEN SATURATION: 94 % | BODY MASS INDEX: 39.79 KG/M2 | DIASTOLIC BLOOD PRESSURE: 82 MMHG | SYSTOLIC BLOOD PRESSURE: 120 MMHG

## 2023-10-06 DIAGNOSIS — I10 ESSENTIAL HYPERTENSION: Primary | ICD-10-CM

## 2023-10-06 DIAGNOSIS — E78.01 FAMILIAL HYPERCHOLESTEROLEMIA: ICD-10-CM

## 2023-10-06 DIAGNOSIS — R00.2 PALPITATIONS: ICD-10-CM

## 2023-12-14 DIAGNOSIS — K21.00 CHRONIC REFLUX ESOPHAGITIS: ICD-10-CM

## 2023-12-14 RX ORDER — PANTOPRAZOLE SODIUM 40 MG/1
TABLET, DELAYED RELEASE ORAL
Qty: 90 TABLET | Refills: 0 | Status: SHIPPED | OUTPATIENT
Start: 2023-12-14

## 2023-12-27 DIAGNOSIS — I10 ESSENTIAL HYPERTENSION: ICD-10-CM

## 2023-12-27 DIAGNOSIS — K21.00 CHRONIC REFLUX ESOPHAGITIS: ICD-10-CM

## 2023-12-27 RX ORDER — METOPROLOL TARTRATE 50 MG/1
TABLET, FILM COATED ORAL
Qty: 180 TABLET | Refills: 1 | Status: SHIPPED | OUTPATIENT
Start: 2023-12-27

## 2023-12-27 RX ORDER — PANTOPRAZOLE SODIUM 40 MG/1
TABLET, DELAYED RELEASE ORAL
Qty: 90 TABLET | Refills: 0 | Status: SHIPPED | OUTPATIENT
Start: 2023-12-27

## 2024-03-13 ENCOUNTER — TELEPHONE (OUTPATIENT)
Dept: FAMILY MEDICINE CLINIC | Facility: CLINIC | Age: 64
End: 2024-03-13

## 2024-03-13 NOTE — TELEPHONE ENCOUNTER
Caller: Dee Grider    Relationship: Self    Best call back number: [phone]    What medication are you requesting: PAXLOVID OR SOMETHING SIMILAR FOR COVID    What are your current symptoms: BODY ACHES, SORE THROAT, COUGH, FEVER    How long have you been experiencing symptoms: SORE THROAT DAY BEFORE YESTERDAY, FEVER YESTERDAY, FEELING WORSE TODAY    If a prescription is needed, what is your preferred pharmacy and phone number: [facility] #[id number] - [address] [phone] [facility] [phone]      Additional notes:

## 2024-03-13 NOTE — TELEPHONE ENCOUNTER
HUB CAN READ!    Please have patient schedule an appointment or mychart video visit to be evaluated.

## 2024-03-13 NOTE — TELEPHONE ENCOUNTER
Name: Dee Grider    Relationship: Self    Best Callback Number   [phone] (Mobile)     HUB PROVIDED THE RELAY MESSAGE FROM THE OFFICE   PATIENT HAS FURTHER QUESTIONS AND WOULD LIKE A CALL BACK AT THE FOLLOWING PHONE NUMBER [phone]    ADDITIONAL INFORMATION: PATIENT SAID SHE DOES NOT HAVE [employer] MY CHART AND DOES NOT WORK COMPUTERS   ALSO SHE DOESN'T FEEL LIKE DRIVING AND HAS NO ONE TO BRING HER

## 2024-03-22 DIAGNOSIS — K21.00 CHRONIC REFLUX ESOPHAGITIS: ICD-10-CM

## 2024-03-22 RX ORDER — PANTOPRAZOLE SODIUM 40 MG/1
TABLET, DELAYED RELEASE ORAL
Qty: 90 TABLET | Refills: 0 | Status: SHIPPED | OUTPATIENT
Start: 2024-03-22

## 2024-03-28 ENCOUNTER — OFFICE VISIT (OUTPATIENT)
Dept: FAMILY MEDICINE CLINIC | Facility: CLINIC | Age: 64
End: 2024-03-28
Payer: MEDICARE

## 2024-03-28 VITALS
HEART RATE: 71 BPM | SYSTOLIC BLOOD PRESSURE: 130 MMHG | TEMPERATURE: 99.1 F | BODY MASS INDEX: 39.64 KG/M2 | WEIGHT: 215.4 LBS | DIASTOLIC BLOOD PRESSURE: 70 MMHG | OXYGEN SATURATION: 97 % | HEIGHT: 62 IN

## 2024-03-28 DIAGNOSIS — G89.29 CHRONIC LEFT-SIDED THORACIC BACK PAIN: ICD-10-CM

## 2024-03-28 DIAGNOSIS — R10.9 FLANK PAIN: Primary | ICD-10-CM

## 2024-03-28 DIAGNOSIS — M54.6 CHRONIC LEFT-SIDED THORACIC BACK PAIN: ICD-10-CM

## 2024-03-28 LAB
BILIRUB BLD-MCNC: NEGATIVE MG/DL
CLARITY, POC: CLEAR
COLOR UR: YELLOW
EXPIRATION DATE: ABNORMAL
GLUCOSE UR STRIP-MCNC: NEGATIVE MG/DL
KETONES UR QL: NEGATIVE
LEUKOCYTE EST, POC: NEGATIVE
Lab: ABNORMAL
NITRITE UR-MCNC: NEGATIVE MG/ML
PH UR: 6 [PH] (ref 5–8)
PROT UR STRIP-MCNC: NEGATIVE MG/DL
RBC # UR STRIP: NEGATIVE /UL
SP GR UR: 1.01 (ref 1–1.03)
UROBILINOGEN UR QL: NORMAL

## 2024-03-28 PROCEDURE — 3078F DIAST BP <80 MM HG: CPT | Performed by: FAMILY MEDICINE

## 2024-03-28 PROCEDURE — 81003 URINALYSIS AUTO W/O SCOPE: CPT | Performed by: FAMILY MEDICINE

## 2024-03-28 PROCEDURE — 3075F SYST BP GE 130 - 139MM HG: CPT | Performed by: FAMILY MEDICINE

## 2024-03-28 PROCEDURE — 99214 OFFICE O/P EST MOD 30 MIN: CPT | Performed by: FAMILY MEDICINE

## 2024-03-28 RX ORDER — CYCLOBENZAPRINE HCL 10 MG
10 TABLET ORAL 2 TIMES DAILY PRN
Qty: 60 TABLET | Refills: 1 | Status: SHIPPED | OUTPATIENT
Start: 2024-03-28

## 2024-03-28 NOTE — PROGRESS NOTES
Subjective     Chief Complaint  Back Pain (Pain was really bad last night.) and Flank Pain    Subjective          Dee Grider is a 63 y.o. female who presents today to [facility] for follow up.    HPI:   History of Present Illness    Ms. Grider is a very pleasant 63-year-old female presents today with mid thoracic pain.  This started just last night and woke her up.  She cannot think of anything that was different leading up to it.  She did not have a fall, she did not lift anything, no Accident etc. she has chronic pain in the area but this is different type of pain on her chronic.  She has no paresthesia of skin, no bowel or bladder incontinence.  The pain is located predominantly in the left flank area.  She has not noted hematuria.    The following portions of the patient's history were reviewed and updated as appropriate: allergies, current medications, past family history, past medical history, past social history, past surgical history and problem list.    Objective     Objective     Allergy:   Allergies   Allergen Reactions    Aspirin Nausea And Vomiting    Sulindac Other (See Comments)        Current Medications:   Current Outpatient Medications   Medication Sig Dispense Refill    acetaminophen (TYLENOL) 500 MG tablet Take 1 tablet by mouth Every 6 (Six) Hours As Needed.      atorvastatin (LIPITOR) 80 MG tablet Take 1 tablet by mouth Daily. 90 tablet 3    docusate sodium (COLACE) 100 MG capsule Take 1 capsule by mouth 2 (Two) Times a Day.      metoprolol tartrate (LOPRESSOR) 50 MG tablet TAKE 1 TABLET BY MOUTH TWICE A  tablet 1    nortriptyline (PAMELOR) 10 MG capsule Take 1 capsule by mouth 3 (Three) Times a Day.      pantoprazole (PROTONIX) 40 MG EC tablet TAKE 1 TABLET BY MOUTH EVERY DAY 90 tablet 0    rizatriptan (MAXALT) 10 MG tablet Take 1 tablet by mouth 1 (One) Time As Needed.      cyclobenzaprine (FLEXERIL) 10 MG tablet Take 1 tablet by mouth 2  (Two) Times a Day As Needed for Muscle Spasms. 60 tablet 1     No current facility-administered medications for this visit.       Past Medical History:  Past Medical History:   Diagnosis Date    Bladder stone     Colon polyps     DDD (degenerative disc disease), lumbar     TATYANA (generalized anxiety disorder)     GERD (gastroesophageal reflux disease)     Heart murmur     Hemorrhoids     HTN (hypertension)     Morbid obesity     MANUEL (obstructive sleep apnea)     Osteoarthritis of both knees     Phantom limb pain     Rheumatic fever     Seasonal allergies     Tobacco dependence        Past Surgical History:  Past Surgical History:   Procedure Laterality Date    ARM AMPUTATION Right 2006    COLONOSCOPY  10/01/2018    ENDOSCOPY N/A 11/8/2019    Procedure: esophagogastroduodenoscopy with biopsies;  Surgeon: Toño Goldstein MD;  Location: [facility] ENDOSCOPY;  Service: Gastroenterology    HYSTERECTOMY  2004    SHOULDER ARTHROSCOPY Left 2011    TONSILLECTOMY  1973    UPPER GASTROINTESTINAL ENDOSCOPY  2014       Social History:  Social History     Socioeconomic History    Marital status:      Spouse name: N/A    Number of children: 0    Years of education: H.S.    Highest education level: High school graduate   Tobacco Use    Smoking status: Every Day     Current packs/day: 0.25     Average packs/day: 0.3 packs/day for 10.2 years (2.6 ttl pk-yrs)     Types: Cigarettes     Start date: 2014    Smokeless tobacco: Never   Vaping Use    Vaping status: Never Used   Substance and Sexual Activity    Alcohol use: Yes     Alcohol/week: 2.0 standard drinks of alcohol     Types: 2 Shots of liquor per week     Comment: 1-2 shots of tequila a night    Drug use: No    Sexual activity: Not Currently     Partners: Male       Family History:  Family History   Problem Relation Age of Onset    Diabetes Mother     Hypertension Mother     Lung cancer Father     Diabetes Sister     Hyperlipidemia Sister     Cancer Sister 50        breast  "   CHIDI disease Sister     Hyperlipidemia Sister     Hypertension Brother     No Known Problems Brother     No Known Problems Brother         Lots of joint pains    No Known Problems Daughter     Seizures Son     Colon cancer Maternal Aunt     No Known Problems Granddaughter     No Known Problems Grandson            Vital Signs:   /70   Pulse 71   Temp 99.1 °F (37.3 °C) (Temporal)   Ht 157.4 cm (61.97\")   Wt 97.7 kg (215 lb 6.4 oz)   SpO2 97%   BMI 39.44 kg/m²      Physical Exam:  Physical Exam  Constitutional:       Appearance: She is not ill-appearing.   Eyes:      Pupils: Pupils are equal, round, and reactive to light.   Cardiovascular:      Rate and Rhythm: Normal rate.      Pulses: Normal pulses.   Pulmonary:      Effort: Pulmonary effort is normal.      Breath sounds: Normal breath sounds.   Musculoskeletal:      Thoracic back: Spasms present. Decreased range of motion.      Lumbar back: Spasms present.   Neurological:      General: No focal deficit present.      Mental Status: She is alert. Mental status is at baseline.   Psychiatric:         Mood and Affect: Mood normal.         Thought Content: Thought content normal.               PHQ-9 Score  PHQ-9 Total Score: 0     Lab Review  Office Visit on 03/28/2024   Component Date Value Ref Range Status    Color 03/28/2024 Yellow  Yellow, Straw, Dark Yellow, Ene Final    Clarity, UA 03/28/2024 Clear  Clear Final    Specific Gravity  03/28/2024 1.015  1.005 - 1.030 Final    pH, Urine 03/28/2024 6.0  5.0 - 8.0 Final    Leukocytes 03/28/2024 Negative  Negative Final    Nitrite, UA 03/28/2024 Negative  Negative Final    Protein, POC 03/28/2024 Negative (A)  Negative mg/dL Final    Glucose, UA 03/28/2024 Negative  Negative mg/dL Final    Ketones, UA 03/28/2024 Negative  Negative Final    Urobilinogen, UA 03/28/2024 Normal  Normal, 0.2 E.U./dL Final    Bilirubin 03/28/2024 Negative  Negative Final    Blood, UA 03/28/2024 Negative  Negative Final    Lot " Number 03/28/2024 [id number]   Final    Expiration Date 03/28/2024 [id number]   Final        Radiology Results        Assessment / Plan         Assessment and Plan   Diagnoses and all orders for this visit:    1. Flank pain (Primary)  -     POC Urinalysis Dipstick, Automated  -     Urine Culture - Urine, Urine, Clean Catch; Future    2. Chronic left-sided thoracic back pain  -     cyclobenzaprine (FLEXERIL) 10 MG tablet; Take 1 tablet by mouth 2 (Two) Times a Day As Needed for Muscle Spasms.  Dispense: 60 tablet; Refill: 1      Urinalysis negative for blood, not indicative of urinary tract infection.  Urine sent for culture to ensure no bacteriuria.  Patient has a history of renal stones.  This does not appear to be a renal stone but advised patient to monitor for symptom progression.  Pain is most likely a flare of her chronic pain with muscle spasm.  She does not tolerate steroids well due to causing anxiety type symptoms, she cannot take NSAIDs due to allergy, she states that she did not tolerate tramadol or norco well in the past either.  Prescribing Flexeril twice daily for the muscle spasm.  If not improving or worsening patient to contact the clinic.    Discussed possible differential diagnoses, testing, treatment, recommended non-pharmacological interventions, risks, warning signs to monitor for that would indicate need for follow-up in clinic or ER. If no improvement with these regimens or you have new or worsening symptoms follow-up. Patient verbalizes understanding and agreement with plan of care. Denies further needs or concerns.     Patient was given instructions and counseling regarding her condition and for health maintenance advised.      Health Maintenance  Health Maintenance:   Health Maintenance Due   Topic Date Due    PAP SMEAR  06/12/2022    ANNUAL WELLNESS VISIT  10/21/2023        Meds ordered during this visit  New Medications Ordered This Visit   Medications    cyclobenzaprine (FLEXERIL)  10 MG tablet     Sig: Take 1 tablet by mouth 2 (Two) Times a Day As Needed for Muscle Spasms.     Dispense:  60 tablet     Refill:  1       Meds stopped during this visit:  There are no discontinued medications.     Visit Diagnoses    ICD-10-CM ICD-9-CM   1. Flank pain  R10.9 789.09   2. Chronic left-sided thoracic back pain  M54.6 724.1    G89.29 338.29       Patient was given instructions and counseling regarding her condition or for health maintenance advice. Please see specific information pulled into the AVS if appropriate.     Follow Up   Return for Next scheduled follow up.          This document has been electronically signed by Lesley Sánchez DO   March 28, 2024 15:51 EDT    Dictated Utilizing Dragon Dictation: Part of this note may be an electronic transcription/translation of spoken language to printed text using the Dragon Dictation System.    Lesley Sánchez D.O.  [facility] [place]

## 2024-04-26 ENCOUNTER — OFFICE VISIT (OUTPATIENT)
Dept: FAMILY MEDICINE CLINIC | Facility: CLINIC | Age: 64
End: 2024-04-26
Payer: MEDICARE

## 2024-04-26 VITALS
HEART RATE: 76 BPM | HEIGHT: 62 IN | DIASTOLIC BLOOD PRESSURE: 68 MMHG | WEIGHT: 216 LBS | TEMPERATURE: 96.4 F | SYSTOLIC BLOOD PRESSURE: 114 MMHG | OXYGEN SATURATION: 98 % | BODY MASS INDEX: 39.75 KG/M2

## 2024-04-26 DIAGNOSIS — M62.838 MUSCLE SPASM: Primary | ICD-10-CM

## 2024-04-26 DIAGNOSIS — M54.2 NECK PAIN: ICD-10-CM

## 2024-04-26 PROCEDURE — 3078F DIAST BP <80 MM HG: CPT | Performed by: PHYSICIAN ASSISTANT

## 2024-04-26 PROCEDURE — 1159F MED LIST DOCD IN RCRD: CPT | Performed by: PHYSICIAN ASSISTANT

## 2024-04-26 PROCEDURE — 1160F RVW MEDS BY RX/DR IN RCRD: CPT | Performed by: PHYSICIAN ASSISTANT

## 2024-04-26 PROCEDURE — 1125F AMNT PAIN NOTED PAIN PRSNT: CPT | Performed by: PHYSICIAN ASSISTANT

## 2024-04-26 PROCEDURE — 99214 OFFICE O/P EST MOD 30 MIN: CPT | Performed by: PHYSICIAN ASSISTANT

## 2024-04-26 PROCEDURE — 3074F SYST BP LT 130 MM HG: CPT | Performed by: PHYSICIAN ASSISTANT

## 2024-04-26 RX ORDER — BACLOFEN 10 MG/1
10 TABLET ORAL 3 TIMES DAILY PRN
Qty: 30 TABLET | Refills: 2 | Status: SHIPPED | OUTPATIENT
Start: 2024-04-26

## 2024-06-22 DIAGNOSIS — I10 ESSENTIAL HYPERTENSION: ICD-10-CM

## 2024-06-24 RX ORDER — METOPROLOL TARTRATE 50 MG/1
TABLET, FILM COATED ORAL
Qty: 180 TABLET | Refills: 1 | Status: SHIPPED | OUTPATIENT
Start: 2024-06-24

## 2024-07-16 DIAGNOSIS — E78.01 FAMILIAL HYPERCHOLESTEROLEMIA: ICD-10-CM

## 2024-07-16 RX ORDER — ATORVASTATIN CALCIUM 80 MG/1
80 TABLET, FILM COATED ORAL DAILY
Qty: 90 TABLET | Refills: 3 | Status: SHIPPED | OUTPATIENT
Start: 2024-07-16

## 2024-09-03 NOTE — PROGRESS NOTES
The ABCs of the Annual Wellness Visit  Subsequent Medicare Wellness Visit    Chief Complaint   Patient presents with   • Medicare Wellness-subsequent   • Shoulder Pain     Bilateral, radiates across breastplate       Subjective    History of Present Illness:  Dee Grider is a 61 y.o. female who presents for a Subsequent Medicare Wellness Visit.    Patient reports chest pain and back pain when she moves her left arm up and across the front of her body towards her right superior shoulder starting last night around suppertime. The pain will have an sharp stabbing (8 out 10) in her left shoulder, and a pulling pressure on her chest and upper back that can be recreated with the movement, afterwards the pressure feeling will linger. She states it feels as though the muscles in her back are pulling away from her spine. This happened before, but she cannot remember when, lasting a few days. Last time she was referred to physical therapy, she could not tolerate the treatment. She denies SOA, light-headedness, or pre-synocope. She states she normally has numbness in left arm, denies any increase in numbness with the pain. Patient states she has vertigo, so would not know if there was associated dizziness. Patient states she has history of left shoulder rotator cuff surgery, 2011.    Patient states that she her migraines frequency has increasing starting about a month ago to 3-4 for days per week, one day she had two the same day. That is very unusually. The migraines presentation has not changed in character, affected area, or pain level.  She states that metoprolol was originally prescribed for her migraines. She states her migraines frequency increases with stress, and family issues with taking care of an aging parent has increased her stress level. She will take rizatriptan for relief, sometimes drinking a full can of Coke soft drink, for the caffeine, will help sometimes but oftern the headaches linger. She has taken  another triptan in the past before the rizatriptan. She sees Dr. Villa neurology at [facility], last seen this summer, she feels her next appointment follow-up Jan.31, 2022.     She states has left ear pain, this is a chronic condition that she has had for more then 10 years, spiking when her allergies are in full effect. The pain feels like it is in her ear canal, sharp pain, with some itching when her allergies are high, aggravate by increase in wind, or heat of the sun. Denies that her allergy medicine helps. She has tried antibiotics multiple times with a previous doctor but never found a solution. She states she can remember getting checked by ENT in the past and was told it was her allergies, she was prescribed allergy medicine but it did not help.    She states starting sometime before April 2021, she notice a lump under her skin, with brown discoloration of the skin, about 2-3 mm, with a surround area of brown discoloration about 2-3 cm in her left inguinal area. She her urologist around that time, Dr. [facility], she states he were going to send antibiotics for her, but did not, she followed up yesterday, he recommended that she talk to her PCP, but did not reinspect it. She states no pain, it has gone down, with minor itching from time to time. She thinks this is the first time. It has reduced in size since April.    She experiences excessive diaphoresis, starting about two years ago. 3 to 4 times per week, multiple times that same day. She does not do anything to relieve. This is different then the hot flashes she experienced during menopause, now no heat, just the sweat. Nothing she knows that triggers. Denies spicy food, caused by alcohol, nor due to exercise.    She stated since her accident in 2006, she has had issues with concentration and memory which interferes with her reading. This has been chronic for 16 years. She has not seen anybody about this. She finds  this decreases quality of life. She states reading was a passion. She also has difficulty staying on topic for long periods of time. 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     The following portions of the patient's history were reviewed and   updated as appropriate: allergies, current medications, past family history, past medical history, past social history, past surgical history and problem list.    Compared to one year ago, the patient feels her physical   health is worse.    Compared to one year ago, the patient feels her mental   health is worse.    Recent Hospitalizations:  She was not admitted to the hospital during the last year.       Current Medical Providers:  Patient Care Team:  Sebastian Pal MD as PCP - General (Family Medicine)  Bobby Tripathi MD as Consulting Physician (Urology)  Benjamín Martinez MD as Consulting Physician (Cardiology)    Outpatient Medications Prior to Visit   Medication Sig Dispense Refill   • acetaminophen (TYLENOL) 500 MG tablet Take 500 mg by mouth Every 6 (Six) Hours As Needed.     • atorvastatin (LIPITOR) 80 MG tablet TAKE 1 TABLET BY MOUTH EVERY DAY 90 tablet 3   • Cetirizine HCl (ZYRTEC ALLERGY PO) Take  by mouth.     • metoprolol tartrate (LOPRESSOR) 25 MG tablet Take 1 tablet by mouth 2 (Two) Times a Day. 60 tablet 12   • pantoprazole (PROTONIX) 40 MG EC tablet TAKE 1 TABLET BY MOUTH EVERY DAY 90 tablet 0   • rizatriptan (MAXALT) 10 MG tablet PLEASE SEE ATTACHED FOR DETAILED DIRECTIONS  5   • cephalexin (KEFLEX) 500 MG capsule Take 500 mg by mouth 2 (Two) Times a Day. For 7 days- Started 2/4/2021     • fluticasone (FLONASE) 50 MCG/ACT nasal spray 2 sprays into the nostril(s) as directed by provider Daily. 3 bottle 1   • ketorolac (TORADOL) 10 MG tablet Take 10 mg by mouth Every 6 (Six) Hours As Needed for Moderate Pain .     • ondansetron (ZOFRAN) 4 MG tablet Take 4 mg by mouth Every 8  (Eight) Hours As Needed for Nausea or Vomiting.       No facility-administered medications prior to visit.       No opioid medication identified on active medication list. I have reviewed chart for other potential  high risk medication/s and harmful drug interactions in the elderly.          Aspirin is not on active medication list.  Aspirin use is not indicated based on review of current medical condition/s. Risk of harm outweighs potential benefits.  .    Patient Active Problem List   Diagnosis   • HTN (hypertension)   • Morbid obesity (HCC)   • MANUEL (obstructive sleep apnea)   • Seasonal allergies   • GERD (gastroesophageal reflux disease)   • Murmur, heart   • Migraine   • Prediabetes   • Familial hypercholesterolemia   • Tobacco abuse   • Chronic GERD   • DDD (degenerative disc disease), lumbar   • Breast mass, left   • Other abnormal and inconclusive findings on diagnostic imaging of breast    • Medicare annual wellness visit, subsequent   • Chronic left-sided thoracic back pain   • Chronic left shoulder pain   • Sebaceous cyst     Advance Care Planning  Advance Directive is not on file.  ACP discussion was declined by the patient. Patient does not have an advance directive, declines further assistance.    Review of Systems   Constitutional: Positive for diaphoresis. Negative for chills and fatigue.   HENT: Positive for ear pain (left side increases with allergies). Negative for sore throat.    Eyes: Negative for pain.   Respiratory: Negative for chest tightness and shortness of breath.    Cardiovascular: Negative for chest pain, palpitations and leg swelling.   Gastrointestinal: Negative for abdominal pain, constipation, diarrhea, nausea and vomiting.   Endocrine: Negative for polydipsia, polyphagia and polyuria.   Genitourinary: Negative for dysuria and hematuria.   Musculoskeletal: Positive for myalgias (left should pain). Negative for arthralgias.   Skin: Negative for rash.   Neurological: Positive for  "dizziness (chronic vertigo). Negative for syncope and light-headedness.   Hematological: Does not bruise/bleed easily.   Psychiatric/Behavioral: Positive for dysphoric mood (feeling a little down). Negative for suicidal ideas. The patient is not nervous/anxious.         Objective    Vitals:    10/06/21 1049   BP: 126/84   Pulse: 91   Resp: 18   Temp: 98.6 °F (37 °C)   TempSrc: Infrared   SpO2: 98%   Weight: 104 kg (230 lb)   Height: 157.5 cm (62\")   PainSc:   8   PainLoc: Shoulder     BMI Readings from Last 1 Encounters:   10/06/21 42.07 kg/m²   BMI is above normal parameters. Recommendations include: educational material, exercise counseling and nutrition counseling    Does the patient have evidence of cognitive impairment? No    Physical Exam  Vitals and nursing note reviewed.   Constitutional:       General: She is not in acute distress.     Appearance: Normal appearance. She is obese. She is not ill-appearing, toxic-appearing or diaphoretic.   HENT:      Right Ear: Tympanic membrane, ear canal and external ear normal. There is no impacted cerumen.      Left Ear: Tympanic membrane, ear canal and external ear normal. Tenderness present. No laceration or swelling.  No middle ear effusion. There is no impacted cerumen. No foreign body. Tympanic membrane is not injected, scarred, perforated, erythematous or bulging.   Cardiovascular:      Rate and Rhythm: Normal rate and regular rhythm.      Pulses: Normal pulses.      Heart sounds: Normal heart sounds. No murmur heard.   No friction rub. No gallop.    Pulmonary:      Effort: Pulmonary effort is normal. No respiratory distress.      Breath sounds: Normal breath sounds. No stridor. No wheezing, rhonchi or rales.   Chest:      Chest wall: No tenderness.   Abdominal:      General: Bowel sounds are normal. There is no distension.      Palpations: There is no mass.      Tenderness: There is no abdominal tenderness. There is no guarding or rebound.      Hernia: No hernia " is present.   Musculoskeletal:      Left shoulder: Tenderness present. Decreased range of motion.      Comments: Adduction of left arm medially and superiorly of right should elicits pain.   Skin:         Neurological:      General: No focal deficit present.      Mental Status: She is alert and oriented to person, place, and time.   Psychiatric:         Mood and Affect: Mood normal.         Behavior: Behavior normal.         Thought Content: Thought content normal.         Judgment: Judgment normal.                 HEALTH RISK ASSESSMENT    Smoking Status:  Social History     Tobacco Use   Smoking Status Current Every Day Smoker   • Packs/day: 0.25   • Years: 15.00   • Pack years: 3.75   • Types: Cigarettes   Smokeless Tobacco Never Used     Alcohol Consumption:  Social History     Substance and Sexual Activity   Alcohol Use Yes    Comment: Occasional     Fall Risk Screen:    Critical access hospital Fall Risk Assessment was completed, and patient is at MODERATE risk for falls. Assessment completed on:10/6/2021    Depression Screening:  PHQ-2/PHQ-9 Depression Screening 10/6/2021   Little interest or pleasure in doing things 0   Feeling down, depressed, or hopeless 1   Trouble falling or staying asleep, or sleeping too much 0   Feeling tired or having little energy 3   Poor appetite or overeating 0   Feeling bad about yourself - or that you are a failure or have let yourself or your family down 0   Trouble concentrating on things, such as reading the newspaper or watching television 3   Moving or speaking so slowly that other people could have noticed. Or the opposite - being so fidgety or restless that you have been moving around a lot more than usual 0   Thoughts that you would be better off dead, or of hurting yourself in some way 0   Total Score 7   If you checked off any problems, how difficult have these problems made it for you to do your work, take care of things at home, or get along with other people? Somewhat difficult        Health Habits and Functional and Cognitive Screening:  Functional & Cognitive Status 10/6/2021   Do you have difficulty preparing food and eating? Yes   Do you have difficulty bathing yourself, getting dressed or grooming yourself? Yes   Do you have difficulty using the toilet? No   Do you have difficulty moving around from place to place? No   Do you have trouble with steps or getting out of a bed or a chair? Yes   Current Diet Unhealthy Diet   Dental Exam Not up to date   Eye Exam Not up to date   Exercise (times per week) 0 times per week   Current Exercises Include No Regular Exercise   Current Exercise Activities Include -   Do you need help using the phone?  No   Are you deaf or do you have serious difficulty hearing?  No   Do you need help with transportation? No   Do you need help shopping? No   Do you need help preparing meals?  Yes   Do you need help with housework?  No   Do you need help with laundry? No   Do you need help taking your medications? No   Do you need help managing money? No   Do you ever drive or ride in a car without wearing a seat belt? No   Have you felt unusual stress, anger or loneliness in the last month? Yes   Who do you live with? Other   If you need help, do you have trouble finding someone available to you? No   Have you been bothered in the last four weeks by sexual problems? No   Do you have difficulty concentrating, remembering or making decisions? Yes       Age-appropriate Screening Schedule:  Refer to the list below for future screening recommendations based on patient's age, sex and/or medical conditions. Orders for these recommended tests are listed in the plan section. The patient has been provided with a written plan.    Health Maintenance   Topic Date Due   • LIPID PANEL  09/29/2021   • INFLUENZA VACCINE  10/06/2022 (Originally 9/1/2021)   • MAMMOGRAM  02/20/2022   • PAP SMEAR  06/12/2022   • TDAP/TD VACCINES  Discontinued   • ZOSTER VACCINE  Discontinued               Assessment/Plan   CMS Preventative Services Quick Reference  Risk Factors Identified During Encounter  Obesity/Overweight   The above risks/problems have been discussed with the patient.  Follow up actions/plans if indicated are seen below in the Assessment/Plan Section.  Pertinent information has been shared with the patient in the After Visit Summary.    Diagnoses and all orders for this visit:    1. Medicare annual wellness visit, subsequent (Primary)  Assessment & Plan:  The patient is here for health maintenance visit.  Currently, the patient consumes a unhealthy diet and has an inadequate exercise regimen.  Screening lab work is ordered.  Immunizations were reviewed today.  Advice and education was given regarding nutrition, aerobic exercise, routine dental evaluations, routine eye exams, reproductive health, cardiovascular risk reduction, sunscreen use, self skin examination (annual dermatology evaluations) and seatbelt use (general overall safety).  Further recommendations will be given if needed after lab evaluation.  Annual wellness evaluation is recommended.      Orders:  -     CBC & Differential  -     Comprehensive Metabolic Panel  -     Hemoglobin A1c  -     Lipid Panel  -     TSH Rfx On Abnormal To Free T4    2. Seasonal allergies  Assessment & Plan:  Patient will continue to take Zyrtec daily and was instructed to take Flonase daily as well to help improve symptoms.    Orders:  -     fluticasone (FLONASE) 50 MCG/ACT nasal spray; 2 sprays into the nostril(s) as directed by provider Daily.  Dispense: 18.2 mL; Refill: 3    3. Acute left-sided thoracic back pain  Assessment & Plan:  Acute flare of chronic back pain.  Also affecting shoulder which is acute on chronic pain.  Patient was given referral to a different physical therapist to try to get relief of symptoms.  Patient can take ibuprofen and Tylenol as needed for pain.    Orders:  -     Ambulatory Referral to Physical Therapy Evaluate and  treat    4. Chronic left shoulder pain  -     Ambulatory Referral to Physical Therapy Evaluate and treat    5. Migraine with aura and without status migrainosus, not intractable  Assessment & Plan:  Headaches are worsening.  Medication changes per orders. Will contact her neurologist to see if she can get in to bee seen sooner. In the mean time she will try nurtec as needed for migraines.         Orders:  -     Rimegepant Sulfate (Nurtec) 75 MG tablet dispersible tablet; Take 1 tablet by mouth 1 (One) Time As Needed (migraine) for up to 1 dose.  Dispense: 30 tablet; Refill: 0    6. Prediabetes  -     Hemoglobin A1c    7. Familial hypercholesterolemia  -     Lipid Panel    8. Diaphoresis  -     TSH Rfx On Abnormal To Free T4    9. Sebaceous cyst  Assessment & Plan:  Patient was given referral to dermatology for removal.    Orders:  -     Ambulatory Referral to Dermatology    10. Skin cancer screening  -     Ambulatory Referral to Dermatology    11. Chronic left-sided thoracic back pain  Assessment & Plan:  Acute flare of chronic back pain.  Also affecting shoulder which is acute on chronic pain.  Patient was given referral to a different physical therapist to try to get relief of symptoms.  Patient can take ibuprofen and Tylenol as needed for pain.        Follow Up:   Return in about 3 months (around 1/6/2022) for back pain and shoulder pain.     An After Visit Summary and PPPS were made available to the patient. I attest that I have reviewed the student note and that the components of the history of the present illness, the physical exam, and the assessment and plan documented were performed by me or were performed in my presence by the student and verified by me.         no

## 2024-09-04 ENCOUNTER — LAB (OUTPATIENT)
Dept: LAB | Facility: HOSPITAL | Age: 64
End: 2024-09-04
Payer: MEDICARE

## 2024-09-04 ENCOUNTER — OFFICE VISIT (OUTPATIENT)
Dept: FAMILY MEDICINE CLINIC | Facility: CLINIC | Age: 64
End: 2024-09-04
Payer: MEDICARE

## 2024-09-04 VITALS
SYSTOLIC BLOOD PRESSURE: 124 MMHG | HEIGHT: 62 IN | OXYGEN SATURATION: 97 % | HEART RATE: 77 BPM | WEIGHT: 221 LBS | DIASTOLIC BLOOD PRESSURE: 80 MMHG | BODY MASS INDEX: 40.67 KG/M2 | TEMPERATURE: 97.9 F

## 2024-09-04 DIAGNOSIS — E66.01 MORBID OBESITY: ICD-10-CM

## 2024-09-04 DIAGNOSIS — G89.29 CHRONIC LEFT SHOULDER PAIN: ICD-10-CM

## 2024-09-04 DIAGNOSIS — M25.552 CHRONIC LEFT HIP PAIN: ICD-10-CM

## 2024-09-04 DIAGNOSIS — I10 PRIMARY HYPERTENSION: ICD-10-CM

## 2024-09-04 DIAGNOSIS — M65.4 DE QUERVAIN'S TENOSYNOVITIS, LEFT: ICD-10-CM

## 2024-09-04 DIAGNOSIS — Z12.11 COLON CANCER SCREENING: ICD-10-CM

## 2024-09-04 DIAGNOSIS — E78.01 FAMILIAL HYPERCHOLESTEROLEMIA: ICD-10-CM

## 2024-09-04 DIAGNOSIS — M25.50 ARTHRALGIA OF MULTIPLE JOINTS: ICD-10-CM

## 2024-09-04 DIAGNOSIS — Z00.00 MEDICARE ANNUAL WELLNESS VISIT, SUBSEQUENT: Primary | ICD-10-CM

## 2024-09-04 DIAGNOSIS — Z00.00 MEDICARE ANNUAL WELLNESS VISIT, SUBSEQUENT: ICD-10-CM

## 2024-09-04 DIAGNOSIS — M54.12 CERVICAL RADICULOPATHY: ICD-10-CM

## 2024-09-04 DIAGNOSIS — M25.512 CHRONIC LEFT SHOULDER PAIN: ICD-10-CM

## 2024-09-04 DIAGNOSIS — G89.29 CHRONIC LEFT HIP PAIN: ICD-10-CM

## 2024-09-04 DIAGNOSIS — R73.03 PREDIABETES: ICD-10-CM

## 2024-09-04 DIAGNOSIS — M77.12 LATERAL EPICONDYLITIS OF LEFT ELBOW: ICD-10-CM

## 2024-09-04 DIAGNOSIS — M54.16 CHRONIC LEFT-SIDED LUMBAR RADICULOPATHY: ICD-10-CM

## 2024-09-04 LAB
ALBUMIN SERPL-MCNC: 4.3 G/DL (ref 3.5–5.2)
ALBUMIN/GLOB SERPL: 1.5 G/DL
ALP SERPL-CCNC: 103 U/L (ref 39–117)
ALT SERPL W P-5'-P-CCNC: 20 U/L (ref 1–33)
ANION GAP SERPL CALCULATED.3IONS-SCNC: 11 MMOL/L (ref 5–15)
AST SERPL-CCNC: 18 U/L (ref 1–32)
BASOPHILS # BLD AUTO: 0.07 10*3/MM3 (ref 0–0.2)
BASOPHILS NFR BLD AUTO: 0.7 % (ref 0–1.5)
BILIRUB SERPL-MCNC: 0.3 MG/DL (ref 0–1.2)
BUN SERPL-MCNC: 7 MG/DL (ref 8–23)
BUN/CREAT SERPL: 13 (ref 7–25)
CALCIUM SPEC-SCNC: 9.5 MG/DL (ref 8.6–10.5)
CHLORIDE SERPL-SCNC: 105 MMOL/L (ref 98–107)
CHOLEST SERPL-MCNC: 160 MG/DL (ref 0–200)
CHROMATIN AB SERPL-ACNC: <10 IU/ML (ref 0–14)
CO2 SERPL-SCNC: 25 MMOL/L (ref 22–29)
CREAT SERPL-MCNC: 0.54 MG/DL (ref 0.57–1)
CRP SERPL-MCNC: 0.41 MG/DL (ref 0–0.5)
DEPRECATED RDW RBC AUTO: 43.4 FL (ref 37–54)
EGFRCR SERPLBLD CKD-EPI 2021: 104.2 ML/MIN/1.73
EOSINOPHIL # BLD AUTO: 0.46 10*3/MM3 (ref 0–0.4)
EOSINOPHIL NFR BLD AUTO: 4.9 % (ref 0.3–6.2)
ERYTHROCYTE [DISTWIDTH] IN BLOOD BY AUTOMATED COUNT: 12.8 % (ref 12.3–15.4)
ERYTHROCYTE [SEDIMENTATION RATE] IN BLOOD: 21 MM/HR (ref 0–30)
GLOBULIN UR ELPH-MCNC: 2.8 GM/DL
GLUCOSE SERPL-MCNC: 84 MG/DL (ref 65–99)
HBA1C MFR BLD: 6.3 % (ref 4.8–5.6)
HCT VFR BLD AUTO: 39.3 % (ref 34–46.6)
HDLC SERPL-MCNC: 45 MG/DL (ref 40–60)
HGB BLD-MCNC: 13 G/DL (ref 12–15.9)
IMM GRANULOCYTES # BLD AUTO: 0.03 10*3/MM3 (ref 0–0.05)
IMM GRANULOCYTES NFR BLD AUTO: 0.3 % (ref 0–0.5)
LDLC SERPL CALC-MCNC: 100 MG/DL (ref 0–100)
LDLC/HDLC SERPL: 2.2 {RATIO}
LYMPHOCYTES # BLD AUTO: 3.7 10*3/MM3 (ref 0.7–3.1)
LYMPHOCYTES NFR BLD AUTO: 39.2 % (ref 19.6–45.3)
MCH RBC QN AUTO: 30.9 PG (ref 26.6–33)
MCHC RBC AUTO-ENTMCNC: 33.1 G/DL (ref 31.5–35.7)
MCV RBC AUTO: 93.3 FL (ref 79–97)
MONOCYTES # BLD AUTO: 0.59 10*3/MM3 (ref 0.1–0.9)
MONOCYTES NFR BLD AUTO: 6.3 % (ref 5–12)
NEUTROPHILS NFR BLD AUTO: 4.58 10*3/MM3 (ref 1.7–7)
NEUTROPHILS NFR BLD AUTO: 48.6 % (ref 42.7–76)
NRBC BLD AUTO-RTO: 0 /100 WBC (ref 0–0.2)
PLATELET # BLD AUTO: 257 10*3/MM3 (ref 140–450)
PMV BLD AUTO: 10.5 FL (ref 6–12)
POTASSIUM SERPL-SCNC: 4.1 MMOL/L (ref 3.5–5.2)
PROT SERPL-MCNC: 7.1 G/DL (ref 6–8.5)
RBC # BLD AUTO: 4.21 10*6/MM3 (ref 3.77–5.28)
SODIUM SERPL-SCNC: 141 MMOL/L (ref 136–145)
TRIGL SERPL-MCNC: 80 MG/DL (ref 0–150)
URATE SERPL-MCNC: 5.2 MG/DL (ref 2.4–5.7)
VLDLC SERPL-MCNC: 15 MG/DL (ref 5–40)
WBC NRBC COR # BLD AUTO: 9.43 10*3/MM3 (ref 3.4–10.8)

## 2024-09-04 PROCEDURE — 99214 OFFICE O/P EST MOD 30 MIN: CPT | Performed by: FAMILY MEDICINE

## 2024-09-04 PROCEDURE — 1125F AMNT PAIN NOTED PAIN PRSNT: CPT | Performed by: FAMILY MEDICINE

## 2024-09-04 PROCEDURE — 86140 C-REACTIVE PROTEIN: CPT

## 2024-09-04 PROCEDURE — 86235 NUCLEAR ANTIGEN ANTIBODY: CPT

## 2024-09-04 PROCEDURE — 1159F MED LIST DOCD IN RCRD: CPT | Performed by: FAMILY MEDICINE

## 2024-09-04 PROCEDURE — 3074F SYST BP LT 130 MM HG: CPT | Performed by: FAMILY MEDICINE

## 2024-09-04 PROCEDURE — 86431 RHEUMATOID FACTOR QUANT: CPT

## 2024-09-04 PROCEDURE — 3079F DIAST BP 80-89 MM HG: CPT | Performed by: FAMILY MEDICINE

## 2024-09-04 PROCEDURE — 83516 IMMUNOASSAY NONANTIBODY: CPT

## 2024-09-04 PROCEDURE — 85025 COMPLETE CBC W/AUTO DIFF WBC: CPT

## 2024-09-04 PROCEDURE — 80061 LIPID PANEL: CPT

## 2024-09-04 PROCEDURE — 85652 RBC SED RATE AUTOMATED: CPT

## 2024-09-04 PROCEDURE — 1170F FXNL STATUS ASSESSED: CPT | Performed by: FAMILY MEDICINE

## 2024-09-04 PROCEDURE — 84550 ASSAY OF BLOOD/URIC ACID: CPT

## 2024-09-04 PROCEDURE — 83036 HEMOGLOBIN GLYCOSYLATED A1C: CPT

## 2024-09-04 PROCEDURE — 80053 COMPREHEN METABOLIC PANEL: CPT

## 2024-09-04 PROCEDURE — 86225 DNA ANTIBODY NATIVE: CPT

## 2024-09-04 PROCEDURE — 1160F RVW MEDS BY RX/DR IN RCRD: CPT | Performed by: FAMILY MEDICINE

## 2024-09-04 PROCEDURE — G0439 PPPS, SUBSEQ VISIT: HCPCS | Performed by: FAMILY MEDICINE

## 2024-09-04 PROCEDURE — 86038 ANTINUCLEAR ANTIBODIES: CPT

## 2024-09-04 RX ORDER — DULOXETIN HYDROCHLORIDE 30 MG/1
60 CAPSULE, DELAYED RELEASE ORAL DAILY
Qty: 60 CAPSULE | Refills: 3 | Status: SHIPPED | OUTPATIENT
Start: 2024-09-04

## 2024-09-04 RX ORDER — METHOCARBAMOL 500 MG/1
500 TABLET, FILM COATED ORAL 4 TIMES DAILY PRN
Qty: 120 TABLET | Refills: 3 | Status: SHIPPED | OUTPATIENT
Start: 2024-09-04

## 2024-09-04 NOTE — ASSESSMENT & PLAN NOTE
Patient has pain in multiple joints including the left hip left shoulder left wrist and left elbow.  Left wrist pain is concerning for de Quervain's tenosynovitis and left elbow pain concerning for lateral epicondylitis.  The remainder of her symptoms could be secondary to arthritis versus cervical radiculopathy.  I suspect that the shoulder pain is likely secondary to cervical radiculopathy.  Discussed pain options with the patient.  She has not done well with NSAIDs in the past or with gabapentin.  There is concern for possible fibromyalgia causing the symptoms.  Patient was given Cymbalta to help with radicular pain as well as possible fibromyalgia pain.  Patient was given muscle relaxer to use as needed.  Order labs to evaluate for rheumatologic disorder.  X-rays ordered of the shoulder and hip.  MRI of cervical spine and lumbar spine ordered as well.  PT offered but was declined.  RTC/ED precautions given.  Patient will follow-up in 3 months or sooner if needed.

## 2024-09-04 NOTE — PATIENT INSTRUCTIONS
"Hypertension, Adult  High blood pressure (hypertension) is when the force of blood pumping through the arteries is too strong. The arteries are the blood vessels that carry blood from the heart throughout the body. Hypertension forces the heart to work harder to pump blood and may cause arteries to become narrow or stiff. Untreated or uncontrolled hypertension can lead to a heart attack, heart failure, a stroke, kidney disease, and other problems.  A blood pressure reading consists of a higher number over a lower number. Ideally, your blood pressure should be below 120/80. The first (\"top\") number is called the systolic pressure. It is a measure of the pressure in your arteries as your heart beats. The second (\"bottom\") number is called the diastolic pressure. It is a measure of the pressure in your arteries as the heart relaxes.  What are the causes?  The exact cause of this condition is not known. There are some conditions that result in high blood pressure.  What increases the risk?  Certain factors may make you more likely to develop high blood pressure. Some of these risk factors are under your control, including:  Smoking.  Not getting enough exercise or physical activity.  Being overweight.  Having too much fat, sugar, calories, or salt (sodium) in your diet.  Drinking too much alcohol.  Other risk factors include:  Having a personal history of heart disease, diabetes, high cholesterol, or kidney disease.  Stress.  Having a family history of high blood pressure and high cholesterol.  Having obstructive sleep apnea.  Age. The risk increases with age.  What are the signs or symptoms?  High blood pressure may not cause symptoms. Very high blood pressure (hypertensive crisis) may cause:  Headache.  Fast or irregular heartbeats (palpitations).  Shortness of breath.  Nosebleed.  Nausea and vomiting.  Vision changes.  Severe chest pain, dizziness, and seizures.  How is this diagnosed?  This condition is diagnosed by " measuring your blood pressure while you are seated, with your arm resting on a flat surface, your legs uncrossed, and your feet flat on the floor. The cuff of the blood pressure monitor will be placed directly against the skin of your upper arm at the level of your heart. Blood pressure should be measured at least twice using the same arm. Certain conditions can cause a difference in blood pressure between your right and left arms.  If you have a high blood pressure reading during one visit or you have normal blood pressure with other risk factors, you may be asked to:  Return on a different day to have your blood pressure checked again.  Monitor your blood pressure at home for 1 week or longer.  If you are diagnosed with hypertension, you may have other blood or imaging tests to help your health care provider understand your overall risk for other conditions.  How is this treated?  This condition is treated by making healthy lifestyle changes, such as eating healthy foods, exercising more, and reducing your alcohol intake. You may be referred for counseling on a healthy diet and physical activity.  Your health care provider may prescribe medicine if lifestyle changes are not enough to get your blood pressure under control and if:  Your systolic blood pressure is above 130.  Your diastolic blood pressure is above 80.  Your personal target blood pressure may vary depending on your medical conditions, your age, and other factors.  Follow these instructions at home:  Eating and drinking    Eat a diet that is high in fiber and potassium, and low in sodium, added sugar, and fat. An example of this eating plan is called the DASH diet. DASH stands for Dietary Approaches to Stop Hypertension. To eat this way:  Eat plenty of fresh fruits and vegetables. Try to fill one half of your plate at each meal with fruits and vegetables.  Eat whole grains, such as whole-wheat pasta, brown rice, or whole-grain bread. Fill about one  fourth of your plate with whole grains.  Eat or drink low-fat dairy products, such as skim milk or low-fat yogurt.  Avoid fatty cuts of meat, processed or cured meats, and poultry with skin. Fill about one fourth of your plate with lean proteins, such as fish, chicken without skin, beans, eggs, or tofu.  Avoid pre-made and processed foods. These tend to be higher in sodium, added sugar, and fat.  Reduce your daily sodium intake. Many people with hypertension should eat less than 1,500 mg of sodium a day.  Do not drink alcohol if:  Your health care provider tells you not to drink.  You are pregnant, may be pregnant, or are planning to become pregnant.  If you drink alcohol:  Limit how much you have to:  0-1 drink a day for women.  0-2 drinks a day for men.  Know how much alcohol is in your drink. In the [place] one drink equals one 12 oz bottle of beer (355 mL), one 5 oz glass of wine (148 mL), or one 1½ oz glass of hard liquor (44 mL).  Lifestyle    Work with your health care provider to maintain a healthy body weight or to lose weight. Ask what an ideal weight is for you.  Get at least 30 minutes of exercise that causes your heart to beat faster (aerobic exercise) most days of the week. Activities may include walking, swimming, or biking.  Include exercise to strengthen your muscles (resistance exercise), such as Pilates or lifting weights, as part of your weekly exercise routine. Try to do these types of exercises for 30 minutes at least 3 days a week.  Do not use any products that contain nicotine or tobacco. These products include cigarettes, chewing tobacco, and vaping devices, such as e-cigarettes. If you need help quitting, ask your health care provider.  Monitor your blood pressure at home as told by your health care provider.  Keep all follow-up visits. This is important.  Medicines  Take over-the-counter and prescription medicines only as told by your health care provider. Follow directions carefully. Blood  pressure medicines must be taken as prescribed.  Do not skip doses of blood pressure medicine. Doing this puts you at risk for problems and can make the medicine less effective.  Ask your health care provider about side effects or reactions to medicines that you should watch for.  Contact a health care provider if you:  Think you are having a reaction to a medicine you are taking.  Have headaches that keep coming back (recurring).  Feel dizzy.  Have swelling in your ankles.  Have trouble with your vision.  Get help right away if you:  Develop a severe headache or confusion.  Have unusual weakness or numbness.  Feel faint.  Have severe pain in your chest or abdomen.  Vomit repeatedly.  Have trouble breathing.  These symptoms may be an emergency. Get help right away. Call 911.  Do not wait to see if the symptoms will go away.  Do not drive yourself to the hospital.  Summary  Hypertension is when the force of blood pumping through your arteries is too strong. If this condition is not controlled, it may put you at risk for serious complications.  Your personal target blood pressure may vary depending on your medical conditions, your age, and other factors. For most people, a normal blood pressure is less than 120/80.  Hypertension is treated with lifestyle changes, medicines, or a combination of both. Lifestyle changes include losing weight, eating a healthy, low-sodium diet, exercising more, and limiting alcohol.  This information is not intended to replace advice given to you by your health care provider. Make sure you discuss any questions you have with your health care provider.  Document Revised: 10/25/2022 Document Reviewed: 10/25/2022  Elsevier Patient Education © 2024 [employer]  BMI for Adults  Body mass index (BMI) is a number found using a person's weight and height. BMI can help tell how much of a person's weight is made up of fat. BMI does not measure body fat directly. It is used instead of tests that  "directly measure body fat, which can be difficult and expensive.  What are BMI measurements used for?  BMI is useful to:  Find out if your weight puts you at higher risk for medical problems.  Help recommend changes, such as in diet and exercise. This can help you reach a healthy weight. BMI screening can be done again to see if these changes are working.  How is BMI calculated?  Your height and weight are measured. The BMI is found from those numbers. This can be done with [place] or metric measurements. Note that charts and online BMI calculators are available to help you find your BMI quickly and easily without doing these calculations.  To calculate your BMI in [place] measurements:  Measure your weight in pounds (lb).  Multiply the number of pounds by 703.  So, for an adult who weighs 150 lb, multiply that number by 703: 150 x 703, which equals 105,450.  Measure your height in inches. Then multiply that number by itself to get a measurement called \"inches squared.\"  So, for an adult who is 70 inches tall, the \"inches squared\" measurement is 70 inches x 70 inches, which equals 4,900 inches squared.  Divide the total from step 2 (number of lb x 703) by the total from step 3 (inches squared): 105,450 ÷ 4,900 = 21.5. This is your BMI.  To calculate your BMI in metric measurements:    Measure your weight in kilograms (kg).  For this example, the weight is 70 kg.  Measure your height in meters (m). Then multiply that number by itself to get a measurement called \"meters squared.\"  So, for an adult who is 1.75 m tall, the \"meters squared\" measurement is 1.75 m x 1.75 m, which equals 3.1 meters squared.  Divide the number of kilograms (your weight) by the meters squared number. In this example: 70 ÷ 3.1 = 22.6. This is your BMI.  What do the results mean?  BMI charts are used to see if you are underweight, normal weight, overweight, or obese. The following guidelines will be used:  Underweight: BMI less than 18.5.  Normal " weight: BMI between 18.5 and 24.9.  Overweight: BMI between 25 and 29.9.  Obese: BMI of 30 or above.  BMI is a tool and cannot diagnose a condition. Talk with your health care provider about what your BMI means for you. Keep these notes in mind:  Weight includes fat and muscle. Someone with a muscular build, such as an athlete, may have a BMI that is higher than 24.9. In cases like these, BMI is not a correct measure of body fat.  If you have a BMI of 25 or higher, your provider may need to do more testing to find out if excess body fat is the cause.  BMI is measured the same way for males and females. Females usually have more body fat than males of the same height and weight.  Where to find more information  For more information about BMI, including tools to quickly find your BMI, go to:  Centers for Disease Control and Prevention: cdc.gov  American Heart Association: heart.org  National Heart, Lung, and Blood [place]: nhlbi.nih.gov  This information is not intended to replace advice given to you by your health care provider. Make sure you discuss any questions you have with your health care provider.  Document Revised: 09/07/2023 Document Reviewed: 08/31/2023  [employer] Patient Education © 2024 [employer]    Advance Care Planning and Advance Directives     You make decisions on a daily basis - decisions about where you want to live, your career, your home, your life. Perhaps one of the most important decisions you face is your choice for future medical care. Take time to talk with your family and your healthcare team and start planning today.  Advance Care Planning is a process that can help you:  Understand possible future healthcare decisions in light of your own experiences  Reflect on those decision in light of your goals and values  Discuss your decisions with those closest to you and the healthcare professionals that care for you  Make a plan by creating a document that reflects your wishes    Surrogate  Decision Maker  In the event of a medical emergency, which has left you unable to communicate or to make your own decisions, you would need someone to make decisions for you.  It is important to discuss your preferences for medical treatment with this person while you are in good health.     Qualities of a surrogate decision maker:  Willing to take on this role and responsibility  Knows what you want for future medical care  Willing to follow your wishes even if they don't agree with them  Able to make difficult medical decisions under stressful circumstances    Advance Directives  These are legal documents you can create that will guide your healthcare team and decision maker(s) when needed. These documents can be stored in the electronic medical record.    Living Will - a legal document to guide your care if you have a terminal condition or a serious illness and are unable to communicate. States vary by statute in document names/types, but most forms may include one or more of the following:        -  Directions regarding life-prolonging treatments        -  Directions regarding artificially provided nutrition/hydration        -  Choosing a healthcare decision maker        -  Direction regarding organ/tissue donation    Durable Power of  for Healthcare - this document names an -in-fact to make medical decisions for you, but it may also allow this person to make personal and financial decisions for you. Please seek the advice of an  if you need this type of document.    **Advance Directives are not required and no one may discriminate against you if you do not sign one.    Medical Orders  Many states allow specific forms/orders signed by your physician to record your wishes for medical treatment in your current state of health. This form, signed in personal communication with your physician, addresses resuscitation and other medical interventions that you may or may not want.      For more  information or to schedule a time with a [facility] Advance Care Planning Facilitator contact: [facility] or call [phone] and someone will contact you directly.Advance Directive    Advance directives are legal documents that allow you to make decisions about your health care and medical treatment in case you become unable to communicate for yourself. Advance directives let your wishes be known to family, friends, and health care providers.  Discussing and writing advance directives should happen over time rather than all at once. Advance directives can be changed and updated at any time. There are different types of advance directives, such as:  Medical power of .  Living will.  Do not resuscitate (DNR) order or do not attempt resuscitation (DNAR) order.  Health care proxy and medical power of   A health care proxy is also called a health care agent. This person is appointed to make medical decisions for you when you are unable to make decisions for yourself. Generally, people ask a trusted friend or family member to act as their proxy and represent their preferences. Make sure you have an agreement with your trusted person to act as your proxy. A proxy may have to make a medical decision on your behalf if your wishes are not known.  A medical power of , also called a durable power of  for health care, is a legal document that names your health care proxy. Depending on the laws in your state, the document may need to be:  Signed.  Notarized.  Dated.  Copied.  Witnessed.  Incorporated into your medical record.  You may also want to appoint a trusted person to manage your money in the event you are unable to do so. This is called a durable power of  for finances. It is a separate legal document from the durable power of  for health care. You may choose your health care proxy or someone different to act as your agent in money matters.  If you do not  appoint a proxy, or there is a concern that the proxy is not acting in your best interest, a court may appoint a guardian to act on your behalf.  Living will  A living will is a set of instructions that state your wishes about medical care when you cannot express them yourself. Health care providers should keep a copy of your living will in your medical record. You may want to give a copy to family members or friends. To alert caregivers in case of an emergency, you can place a card in your wallet to let them know that you have a living will and where they can find it. A living will is used if you become:  Terminally ill.  Disabled.  Unable to communicate or make decisions.  The following decisions should be included in your living will:  To use or not to use life support equipment, such as dialysis machines and breathing machines (ventilators).  Whether you want a DNR or DNAR order. This tells health care providers not to use cardiopulmonary resuscitation (CPR) if breathing or heartbeat stops.  To use or not to use tube feeding.  To be given or not to be given food and fluids.  Whether you want comfort (palliative) care when the goal becomes comfort rather than a cure.  Whether you want to donate your organs and tissues.  A living will does not give instructions for distributing your money and property if you should pass away.  DNR or DNAR  A DNR or DNAR order is a request not to have CPR in the event that your heart stops beating or you stop breathing. If a DNR or DNAR order has not been made and shared, a health care provider will try to help any patient whose heart has stopped or who has stopped breathing. If you plan to have surgery, talk with your health care provider about how your DNR or DNAR order will be followed if problems occur.  What if I do not have an advance directive?  Some states assign family decision makers to act on your behalf if you do not have an advance directive. Each state has its own  "laws about advance directives. You may want to check with your health care provider, , or state representative about the laws in your state.  Summary  Advance directives are legal documents that allow you to make decisions about your health care and medical treatment in case you become unable to communicate for yourself.  The process of discussing and writing advance directives should happen over time. You can change and update advance directives at any time.  Advance directives may include a medical power of , a living will, and a DNR or DNAR order.  This information is not intended to replace advice given to you by your health care provider. Make sure you discuss any questions you have with your health care provider.  Document Revised: 09/21/2021 Document Reviewed: 09/21/2021  [employer] Patient Education © 2024 [employer]  Smoking Tobacco Information, Adult  Smoking tobacco can be harmful to your health. Tobacco contains a toxic colorless chemical called nicotine. Nicotine causes changes in your brain that make you want more and more. This is called addiction. This can make it hard to stop smoking once you start. Tobacco also has other toxic chemicals that can hurt your body and raise your risk of many cancers.  Menthol or \"lite\" tobacco or cigarette brands are not safer than regular brands.  How can smoking tobacco affect me?  Smoking tobacco puts you at risk for:  Cancer. Smoking is most commonly associated with lung cancer, but can also lead to cancer in other parts of the body.  Chronic obstructive pulmonary disease (COPD). This is a long-term lung condition that makes it hard to breathe. It also gets worse over time.  High blood pressure (hypertension), heart disease, stroke, heart attack, and lung infections, such as pneumonia.  Cataracts. This is when the lenses in the eyes become clouded.  Digestive problems. This may include peptic ulcers, heartburn, and gastroesophageal reflux disease " (GERD).  Oral health problems, such as gum disease, mouth sores, and tooth loss.  Loss of taste and smell.  Smoking also affects how you look and smell. Smoking may cause:  Wrinkles.  Yellow or stained teeth, fingers, and fingernails.  Bad breath.  Bad-smelling clothes and hair.  Smoking tobacco can also affect your social life, because:  It may be challenging to find places to smoke when away from home. Many workplaces, restaurants, hotels, and public places are tobacco-free.  Smoking is expensive. This is due to the cost of tobacco and the long-term costs of treating health problems from smoking.  Secondhand smoke may affect those around you. Secondhand smoke can cause lung cancer, breathing problems, and heart disease. Children of smokers have a higher risk for:  Sudden infant death syndrome (SIDS).  Ear infections.  Lung infections.  What actions can I take to prevent health problems?  Quit smoking    Do not start smoking. Quit if you already smoke.  Do not replace cigarette smoking with vaping devices, such as e-cigarettes.  Make a plan to quit smoking and commit to it. Look for programs to help you, and ask your health care provider for recommendations and ideas. Set a date and write down all the reasons you want to quit.  Let your friends and family know you are quitting so they can help and support you. Consider finding friends who also want to quit. It can be easier to quit with someone else, so that you can support each other.  Talk with your health care provider about using nicotine replacement medicines to help you quit. These include gum, lozenges, patches, sprays, or pills.  If you try to quit but return to smoking, stay positive. It is common to slip up when you first quit, so take it one day at a time.  Be prepared for cravings. When you feel the urge to smoke, chew gum or suck on hard candy.  Lifestyle  Stay busy.  Take care of your body. Get plenty of exercise, eat a healthy diet, and drink plenty  of water.  Find ways to manage your stress, such as meditation, yoga, exercise, or time spent with friends and family.  Ask your health care provider about having regular tests (screenings) to check for cancer. This may include blood tests, imaging tests, and other tests.  Where to find support  To get support to quit smoking, consider:  Asking your health care provider for more information and resources.  Joining a support group for people who want to quit smoking in your local community. There are many effective programs that may help you to quit.  Calling the smokefree.gov counselor helpline at 6-595-QUIT-NOW (1-279.196.1003).  Where to find more information  You may find more information about quitting smoking from:  Centers for Disease Control and Prevention: cdc.gov/tobacco  Smokefree.gov: smokefree.gov  American Lung Association: freedomfromsmoking.org  Contact a health care provider if:  You have problems breathing.  Your lips, nose, or fingers turn blue.  You have chest pain.  You are coughing up blood.  You feel like you will faint.  You have other health changes that cause you to worry.  Summary  Smoking tobacco can negatively affect your health, the health of those around you, your finances, and your social life.  Do not start smoking. Quit if you already smoke. If you need help quitting, ask your health care provider.  Consider joining a support group for people in your local community who want to quit smoking. There are many effective programs that may help you to quit.  This information is not intended to replace advice given to you by your health care provider. Make sure you discuss any questions you have with your health care provider.  Document Revised: 12/13/2022 Document Reviewed: 12/13/2022  Elsevier Patient Education © 2024 [employer].

## 2024-09-04 NOTE — ASSESSMENT & PLAN NOTE
Patient's (Body mass index is 40.67 kg/m².) indicates that they are morbidly/severely obese (BMI > 40 or > 35 with obesity - related health condition) with health conditions that include obstructive sleep apnea, hypertension, impaired fasting glucose, dyslipidemias, and GERD . Weight is unchanged. BMI  is above average; BMI management plan is completed. We discussed low calorie, low carb based diet program, portion control, and increasing exercise.
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
## 2024-09-05 LAB
ANA SER QL: POSITIVE
CENTROMERE B AB SER-ACNC: <0.2 AI (ref 0–0.9)
CHROMATIN AB SERPL-ACNC: <0.2 AI (ref 0–0.9)
DSDNA AB SER-ACNC: 1 IU/ML (ref 0–9)
ENA JO1 AB SER-ACNC: <0.2 AI (ref 0–0.9)
ENA RNP AB SER-ACNC: 1.9 AI (ref 0–0.9)
ENA SCL70 AB SER-ACNC: <0.2 AI (ref 0–0.9)
ENA SM AB SER-ACNC: <0.2 AI (ref 0–0.9)
ENA SM+RNP AB SER-ACNC: <0.2 AI (ref 0–0.9)
ENA SS-A AB SER-ACNC: <0.2 AI (ref 0–0.9)
ENA SS-B AB SER-ACNC: <0.2 AI (ref 0–0.9)
Lab: ABNORMAL
RIBOSOMAL P AB SER-ACNC: <0.2 AI (ref 0–0.9)

## 2024-09-22 ENCOUNTER — HOSPITAL ENCOUNTER (OUTPATIENT)
Dept: MRI IMAGING | Facility: HOSPITAL | Age: 64
Discharge: HOME | End: 2024-09-22
Payer: MEDICARE

## 2024-09-22 DIAGNOSIS — M54.12 CERVICAL RADICULOPATHY: ICD-10-CM

## 2024-09-22 DIAGNOSIS — M54.16 CHRONIC LEFT-SIDED LUMBAR RADICULOPATHY: ICD-10-CM

## 2024-09-22 PROCEDURE — 72148 MRI LUMBAR SPINE W/O DYE: CPT

## 2024-09-22 PROCEDURE — 72141 MRI NECK SPINE W/O DYE: CPT

## 2024-09-27 DIAGNOSIS — G89.29 CHRONIC LEFT HIP PAIN: ICD-10-CM

## 2024-09-27 DIAGNOSIS — M77.12 LATERAL EPICONDYLITIS OF LEFT ELBOW: ICD-10-CM

## 2024-09-27 DIAGNOSIS — M65.4 DE QUERVAIN'S TENOSYNOVITIS, LEFT: ICD-10-CM

## 2024-09-27 DIAGNOSIS — M70.62 GREATER TROCHANTERIC BURSITIS OF LEFT HIP: ICD-10-CM

## 2024-09-27 DIAGNOSIS — G89.29 CHRONIC LEFT SHOULDER PAIN: ICD-10-CM

## 2024-09-27 DIAGNOSIS — M25.552 CHRONIC LEFT HIP PAIN: ICD-10-CM

## 2024-09-27 DIAGNOSIS — M25.512 CHRONIC LEFT SHOULDER PAIN: ICD-10-CM

## 2024-10-02 DIAGNOSIS — M25.50 ARTHRALGIA OF MULTIPLE JOINTS: ICD-10-CM

## 2024-10-02 DIAGNOSIS — M54.16 CHRONIC LEFT-SIDED LUMBAR RADICULOPATHY: ICD-10-CM

## 2024-10-02 DIAGNOSIS — M54.12 CERVICAL RADICULOPATHY: ICD-10-CM

## 2024-10-02 RX ORDER — DULOXETIN HYDROCHLORIDE 30 MG/1
60 CAPSULE, DELAYED RELEASE ORAL DAILY
Qty: 60 CAPSULE | Refills: 0 | Status: SHIPPED | OUTPATIENT
Start: 2024-10-02

## 2024-10-08 ENCOUNTER — TELEPHONE (OUTPATIENT)
Dept: FAMILY MEDICINE CLINIC | Facility: CLINIC | Age: 64
End: 2024-10-08
Payer: MEDICARE

## 2024-10-08 ENCOUNTER — OFFICE VISIT (OUTPATIENT)
Dept: ORTHOPEDIC SURGERY | Facility: CLINIC | Age: 64
End: 2024-10-08
Payer: MEDICARE

## 2024-10-08 VITALS
WEIGHT: 222.6 LBS | HEIGHT: 62 IN | BODY MASS INDEX: 40.96 KG/M2 | SYSTOLIC BLOOD PRESSURE: 142 MMHG | DIASTOLIC BLOOD PRESSURE: 80 MMHG

## 2024-10-08 DIAGNOSIS — M16.12 PRIMARY OSTEOARTHRITIS OF LEFT HIP: ICD-10-CM

## 2024-10-08 DIAGNOSIS — M70.62 TROCHANTERIC BURSITIS OF LEFT HIP: Primary | ICD-10-CM

## 2024-10-08 DIAGNOSIS — M70.72 ISCHIAL BURSITIS OF LEFT SIDE: ICD-10-CM

## 2024-10-08 RX ORDER — TRIAMCINOLONE ACETONIDE 40 MG/ML
80 INJECTION, SUSPENSION INTRA-ARTICULAR; INTRAMUSCULAR
Status: COMPLETED | OUTPATIENT
Start: 2024-10-08 | End: 2024-10-08

## 2024-10-08 RX ORDER — LIDOCAINE HYDROCHLORIDE 10 MG/ML
3 INJECTION, SOLUTION EPIDURAL; INFILTRATION; INTRACAUDAL; PERINEURAL
Status: COMPLETED | OUTPATIENT
Start: 2024-10-08 | End: 2024-10-08

## 2024-10-08 RX ORDER — BUPIVACAINE HYDROCHLORIDE 2.5 MG/ML
3 INJECTION, SOLUTION EPIDURAL; INFILTRATION; INTRACAUDAL
Status: COMPLETED | OUTPATIENT
Start: 2024-10-08 | End: 2024-10-08

## 2024-10-08 RX ADMIN — BUPIVACAINE HYDROCHLORIDE 3 ML: 2.5 INJECTION, SOLUTION EPIDURAL; INFILTRATION; INTRACAUDAL at 08:35

## 2024-10-08 RX ADMIN — LIDOCAINE HYDROCHLORIDE 3 ML: 10 INJECTION, SOLUTION EPIDURAL; INFILTRATION; INTRACAUDAL; PERINEURAL at 08:35

## 2024-10-08 RX ADMIN — TRIAMCINOLONE ACETONIDE 80 MG: 40 INJECTION, SUSPENSION INTRA-ARTICULAR; INTRAMUSCULAR at 08:35

## 2024-10-08 NOTE — PROGRESS NOTES
Orthopaedic Clinic Note: Hip New Patient    Chief Complaint   Patient presents with    Left Hip - Pain        HPI    Dee Grider is a 64 y.o. female who presents with left hip pain for 4 year(s). Onset atraumatic and gradual in nature. Pain is localized to lateral trochanter and ischial region and is a 8/10 on the pain scale.Pain is described as aching, stabbing, and shooting. Associated symptoms include pain and numbness/tingling .  The pain is worse with walking, standing, sitting, climbing stairs, sleeping, working, leisure, lying on affected side, rising from seated position, and any movement of the joint; nothing improves the pain. Previous treatments have included: NSAID's since symptom onset. Although some transient relief was reported with these interventions, these conservative measures have failed and symptoms have persisted. The patient is limited in daily activities and has had a significant decrease in quality of life as a result. She denies fevers, chills, or constitutional symptoms.    I have reviewed the following portions of the patient's history:History of Present Illness    Past Medical History:   Diagnosis Date    Bladder stone     Colon polyps     DDD (degenerative disc disease), lumbar     TATYANA (generalized anxiety disorder)     GERD (gastroesophageal reflux disease)     Heart murmur     Hemorrhoids     HTN (hypertension)     Morbid obesity     MANUEL (obstructive sleep apnea)     Osteoarthritis of both knees     Phantom limb pain     Rheumatic fever     Seasonal allergies     Tobacco dependence       Past Surgical History:   Procedure Laterality Date    ARM AMPUTATION Right 2006    COLONOSCOPY  10/01/2018    ENDOSCOPY N/A 11/8/2019    Procedure: esophagogastroduodenoscopy with biopsies;  Surgeon: Toño Goldstein MD;  Location: [facility] ENDOSCOPY;  Service: Gastroenterology    HYSTERECTOMY  2004    SHOULDER ARTHROSCOPY Left 2011    TONSILLECTOMY  1973    UPPER GASTROINTESTINAL ENDOSCOPY  2014       Family History   Problem Relation Age of Onset    Diabetes Mother     Hypertension Mother     Lung cancer Father     Diabetes Sister     Hyperlipidemia Sister     Cancer Sister 50        breast    Bone cancer Sister     CHIDI disease Sister     Hyperlipidemia Sister     Hypertension Brother     No Known Problems Brother     No Known Problems Brother         Lots of joint pains    No Known Problems Daughter     Seizures Son     Colon cancer Maternal Aunt     No Known Problems Grandson     No Known Problems Granddaughter      Social History     Socioeconomic History    Marital status:      Spouse name: N/A    Number of children: 0    Years of education: H.S.    Highest education level: High school graduate   Tobacco Use    Smoking status: Every Day     Current packs/day: 0.25     Average packs/day: 0.3 packs/day for 10.8 years (2.7 ttl pk-yrs)     Types: Cigarettes     Start date: 2014    Smokeless tobacco: Never   Vaping Use    Vaping status: Never Used   Substance and Sexual Activity    Alcohol use: Not Currently     Alcohol/week: 2.0 standard drinks of alcohol     Types: 1 Cans of beer, 1 Shots of liquor per week    Drug use: No    Sexual activity: Not Currently     Partners: Male      Current Outpatient Medications on File Prior to Visit   Medication Sig Dispense Refill    acetaminophen (TYLENOL) 500 MG tablet Take 1 tablet by mouth Every 6 (Six) Hours As Needed.      atorvastatin (LIPITOR) 80 MG tablet TAKE 1 TABLET BY MOUTH EVERY DAY 90 tablet 3    docusate sodium (COLACE) 100 MG capsule Take 1 capsule by mouth 2 (Two) Times a Day.      DULoxetine (CYMBALTA) 30 MG capsule Take 2 capsules by mouth Daily. 60 capsule 0    metoprolol tartrate (LOPRESSOR) 50 MG tablet TAKE 1 TABLET BY MOUTH TWICE A  tablet 1    pantoprazole (PROTONIX) 40 MG EC tablet TAKE 1 TABLET BY MOUTH EVERY DAY 90 tablet 0    rizatriptan (MAXALT) 10 MG tablet Take 1 tablet by mouth 1 (One) Time As Needed.      methocarbamol  "(ROBAXIN) 500 MG tablet Take 1 tablet by mouth 4 (Four) Times a Day As Needed for Muscle Spasms. (Patient not taking: Reported on 10/8/2024) 120 tablet 3     No current facility-administered medications on file prior to visit.      Allergies   Allergen Reactions    Aspirin Nausea And Vomiting    Sulindac Other (See Comments)    Gabapentin Anxiety        Review of Systems   Constitutional: Negative.    HENT: Negative.     Eyes: Negative.    Respiratory: Negative.     Cardiovascular: Negative.    Gastrointestinal: Negative.    Endocrine: Negative.    Genitourinary: Negative.    Musculoskeletal:  Positive for arthralgias.   Skin: Negative.    Allergic/Immunologic: Negative.    Neurological: Negative.    Hematological: Negative.    Psychiatric/Behavioral: Negative.          The patient's Review of Systems was personally reviewed and confirmed as accurate.    The following portions of the patient's history were reviewed and updated as appropriate: allergies, current medications, past family history, past medical history, past social history, past surgical history, and problem list.    Physical Exam  Blood pressure 142/80, height 157.5 cm (62.01\"), weight 101 kg (222 lb 9.6 oz), not currently breastfeeding.    Body mass index is 40.7 kg/m².    GENERAL APPEARANCE: awake, alert & oriented x 3, in no acute distress and well developed, well nourished  PSYCH: normal affect  LUNGS:  breathing nonlabored  EYES: sclera anicteric  CARDIOVASCULAR: palpable dorsalis pedis, palpable posterior tibial bilaterally. Capillary refill less than 2 seconds  EXTREMITIES: no clubbing, cyanosis  GAIT:  Antalgic             Left Hip Exam:   RANGE OF MOTION:   FLEXION CONTRACTURE: None   FLEXION: 110 degrees   INTERNAL ROTATION: 20 degrees at 90 degrees of flexion   EXTERNAL ROTATION: 40 degrees at 90 degrees of flexion    PAIN WITH HIP MOTION: no  PAIN WITH LOGROLL: no  STINCHFIELD TEST: negative    STRENGTH:  5/5 hip adduction, abduction, " flexion. 5/5 strength knee flexion, extension. 5/5 strength ankle dorsiflexion and plantarflexion.     GREATER TROCHANTER BURSAL PAIN:  yes  Tender palpation about ischial bursa     REFLEXES:   PATELLAR 2+/4   ACHILLES 2+/4    CLONUS: negative  STRAIGHT LEG TEST:   negative    SENSATION TO LIGHT TOUCH:  DEEP PERONEAL/SUPERFICIAL PERONEAL/SURAL/SAPHENOUS/TIBIAL:  intact    EDEMA:   no  ERYTHEMA:  no  WOUNDS/INCISIONS: no overlying skin problems.      ------------------------------------------------------------------    LEG LENGTHS:  equal  _____________________________________________________  _____________________________________________________    RADIOGRAPHIC FINDINGS:   X-rays of the pelvis and left hip from 9/4/2024 were personally reviewed and interpreted.  Imaging reveals mild degenerative changes of the left hip with no acute bony injury or fracture.  Small periarticular osteophytes visualized in the femoral acetabular margins.    Assessment/Plan:   Diagnosis Plan   1. Trochanteric bursitis of left hip        2. Primary osteoarthritis of left hip        3. Ischial bursitis of left side          Patient is suffering from ischial bursitis as well as trochanteric bursitis.  She does have mild degenerative changes of the left hip with mild arthritis is asymptomatic on exam today.  I discussed treatment options.  She is agreeable to trochanteric bursa cortisone injection today.  Declines prescription anti-inflammatory or physical therapy.  She will follow-up as needed.    Procedure Note:  I discussed with the patient the potential benefits of performing a therapeutic injection of the left hip trochanteric bursa as well as potential risks including but not limited to infection, swelling, pain, bleeding, bruising, nerve/vessel damage, skin color changes, transient elevation in blood glucose levels, and fat atrophy. After informed consent and verifying correct patient, procedure site, and type of procedure, the area  was prepped with alcohol, ethyl chloride was used to numb the skin. Via the direct lateral approach, 3 cc of 1% lidocaine, 3 cc of 0.25% Marcaine and 2 cc of 40mg/ml of Kenalog were injected into the left hip trochanteric bursa. The patient tolerated the procedure well. There were no complications. A sterile dressing was placed over the injection site.      Jus Unger MD  10/08/24  08:35 EDT

## 2024-10-08 NOTE — TELEPHONE ENCOUNTER
Called and explained to patient results in a way she could understand. She is agreeable to MRI and Pain Management referral and knows someone will contact her to schedule once orders are placed.

## 2024-10-08 NOTE — TELEPHONE ENCOUNTER
HUB TO RELAY    LVM. Please the patient know that the MRI of her cervical spine showed mild spinal cord impingement and mild spinal stenosis at the C5-C6 level.  Left neuroforaminal stenosis at the C6-C7 level.  I recommend patient see pain management for further evaluation and treatment if they do not feel that they can provide her relief she may need referral to neurosurgery in the future.  Patient is agreeable to referral to pain management please a me know and I will place the order.    Please the patient know that the MRI of her lumbar spine showed multilevel degenerative changes.  Moderate foraminal narrowing at the L5-S1.  There was nonspecific T1 and T2 hyperintensity at the level of the L4 vertebral body.  MRI of the lumbar spine with contrast is recommended.  I would also recommend follow-up with pain management for further evaluation of the back pain.  The patient is agreeable to referral please let me know and I will place the order.  If patient is agreeable to MRI with contrast spine for further evaluation please let me know and I will place the order.

## 2024-10-08 NOTE — TELEPHONE ENCOUNTER
Message from Sebastian Pal sent at 10/5/2024  1:22 PM EDT -----  Please the patient know that the MRI of her cervical spine showed mild spinal cord impingement and mild spinal stenosis at the C5-C6 level.  Left neuroforaminal stenosis at the C6-C7 level.  I recommend patient see pain management for further evaluation and treatment if they do not feel that they can provide her relief she may need referral to neurosurgery in the future.  Patient is agreeable to referral to pain management please a me know and I will place the order.    Please the patient know that the MRI of her lumbar spine showed multilevel degenerative changes.  Moderate foraminal narrowing at the L5-S1.  There was nonspecific T1 and T2 hyperintensity at the level of the L4 vertebral body.  MRI of the lumbar spine with contrast is recommended.  I would also recommend follow-up with pain management for further evaluation of the back pain.  The patient is agreeable to referral please let me know and I will place the order.  If patient is agreeable to MRI with contrast spine for further evaluation please let me know and I will place the order.

## 2024-10-08 NOTE — TELEPHONE ENCOUNTER
Name: Dee Grider    Relationship: Self    Best Callback Number: [phone]     HUB PROVIDED THE RELAY MESSAGE FROM THE OFFICE   PATIENT HAS FURTHER QUESTIONS AND WOULD LIKE A CALL BACK AT THE FOLLOWING PHONE NUMBER [phone]    ADDITIONAL INFORMATION:RELAYED    PATIENT DID NOT UNDERSTAND ANY OF THE HUB TO RELAY MESSAGE AND WOULD LIKE A CALL BACK TO GO OVER IN DETAIL FOR BETTER UNDERSTANDING    PATIENT IS OK WITH AN ORDER FOR MRI WITH CONTRAST AND A REFERRAL TO PAIN MANAGEMENT     PLEASE ADVISE AND CALL PATIENT

## 2024-10-08 NOTE — PROGRESS NOTES
Procedure   Large Joint Arthrocentesis: L greater trochanteric bursa  Date/Time: 10/8/2024 8:35 AM  Consent given by: patient  Site marked: site marked  Timeout: Immediately prior to procedure a time out was called to verify the correct patient, procedure, equipment, support staff and site/side marked as required   Supporting Documentation  Indications: pain   Procedure Details  Location: hip - L greater trochanteric bursa  Preparation: Patient was prepped and draped in the usual sterile fashion  Needle size: 22 G  Approach: lateral  Medications administered: 3 mL bupivacaine (PF) 0.25 %; 3 mL lidocaine PF 1% 1 %; 80 mg triamcinolone acetonide 40 MG/ML  Patient tolerance: patient tolerated the procedure well with no immediate complications

## 2024-10-10 DIAGNOSIS — M54.12 CERVICAL RADICULOPATHY: ICD-10-CM

## 2024-10-10 DIAGNOSIS — M54.6 CHRONIC LEFT-SIDED THORACIC BACK PAIN: Primary | ICD-10-CM

## 2024-10-10 DIAGNOSIS — R93.7 ABNORMAL MAGNETIC RESONANCE IMAGING OF LUMBAR SPINE: ICD-10-CM

## 2024-10-10 DIAGNOSIS — G89.29 CHRONIC LEFT-SIDED THORACIC BACK PAIN: Primary | ICD-10-CM

## 2024-10-10 DIAGNOSIS — M54.16 CHRONIC LEFT-SIDED LUMBAR RADICULOPATHY: ICD-10-CM

## 2024-10-11 ENCOUNTER — OFFICE VISIT (OUTPATIENT)
Dept: CARDIOLOGY | Facility: CLINIC | Age: 64
End: 2024-10-11
Payer: MEDICARE

## 2024-10-11 VITALS
DIASTOLIC BLOOD PRESSURE: 64 MMHG | SYSTOLIC BLOOD PRESSURE: 122 MMHG | HEART RATE: 62 BPM | HEIGHT: 62 IN | BODY MASS INDEX: 40.12 KG/M2 | WEIGHT: 218 LBS | OXYGEN SATURATION: 96 %

## 2024-10-11 DIAGNOSIS — I10 ESSENTIAL HYPERTENSION: ICD-10-CM

## 2024-10-11 DIAGNOSIS — E78.5 DYSLIPIDEMIA: ICD-10-CM

## 2024-10-11 DIAGNOSIS — R60.0 LOWER EXTREMITY EDEMA: ICD-10-CM

## 2024-10-11 DIAGNOSIS — R01.1 MURMUR, HEART: Primary | ICD-10-CM

## 2024-10-11 DIAGNOSIS — Z72.0 TOBACCO ABUSE: ICD-10-CM

## 2024-10-11 DIAGNOSIS — R00.2 PALPITATIONS: ICD-10-CM

## 2024-10-11 PROBLEM — E78.01 FAMILIAL HYPERCHOLESTEROLEMIA: Status: RESOLVED | Noted: 2018-10-11 | Resolved: 2024-10-11

## 2024-10-11 PROCEDURE — 3074F SYST BP LT 130 MM HG: CPT | Performed by: NURSE PRACTITIONER

## 2024-10-11 PROCEDURE — 1159F MED LIST DOCD IN RCRD: CPT | Performed by: NURSE PRACTITIONER

## 2024-10-11 PROCEDURE — 93000 ELECTROCARDIOGRAM COMPLETE: CPT | Performed by: NURSE PRACTITIONER

## 2024-10-11 PROCEDURE — 1160F RVW MEDS BY RX/DR IN RCRD: CPT | Performed by: NURSE PRACTITIONER

## 2024-10-11 PROCEDURE — 99214 OFFICE O/P EST MOD 30 MIN: CPT | Performed by: NURSE PRACTITIONER

## 2024-10-11 PROCEDURE — 3078F DIAST BP <80 MM HG: CPT | Performed by: NURSE PRACTITIONER

## 2024-10-11 RX ORDER — SENNOSIDES A AND B 8.6 MG/1
1 TABLET, FILM COATED ORAL DAILY
COMMUNITY

## 2024-10-11 NOTE — PROGRESS NOTES
[facility]    Encounter Date: 10/11/2024    Patient ID: Dee Grider is a 64 y.o. female.  : 1960     PCP: Sebastian Pal MD       Chief Complaint: Essential hypertension      PROBLEM LIST:  Cardiac murmur:  Echo, 2018: EF 60%. LV diastolic dysfunction (grade I). Mild AI.   Echo, 2021: EF 60%. Trace MR. Mild AI. Mild TR with normal RVSP.   Palpitations:  Consistent with awareness of exertional sinus tachycardia  Improved with increased dose of metoprolol.  Hypertension.  Lower extremity edema  Familial hypercholesterolemia.  Morbid obesity.  Tobacco abuse.  MANUEL.  Pre-diabetes.  Seasonal allergies.  GERD.    History of Present Illness  Patient presents today for a 1 year follow-up with a history of palpitations and cardiac risk factors. Since last visit, she has not had any hospitalizations or new medical diagnoses.  She gets occasional lower extremity edema but denies any chest pain or shortness of breath.  She declines diuretics because she thinks it is hard on the body.  She is on statin therapy and tolerating without myalgias.  Last lipid panel was at goal.    Allergies   Allergen Reactions    Aspirin Nausea And Vomiting    Sulindac Other (See Comments)    Gabapentin Anxiety         Current Outpatient Medications:     acetaminophen (TYLENOL) 500 MG tablet, Take 1 tablet by mouth Every 6 (Six) Hours As Needed., Disp: , Rfl:     atorvastatin (LIPITOR) 80 MG tablet, TAKE 1 TABLET BY MOUTH EVERY DAY, Disp: 90 tablet, Rfl: 3    metoprolol tartrate (LOPRESSOR) 50 MG tablet, TAKE 1 TABLET BY MOUTH TWICE A DAY, Disp: 180 tablet, Rfl: 1    pantoprazole (PROTONIX) 40 MG EC tablet, TAKE 1 TABLET BY MOUTH EVERY DAY, Disp: 90 tablet, Rfl: 0    rizatriptan (MAXALT) 10 MG tablet, Take 1 tablet by mouth 1 (One) Time As Needed., Disp: , Rfl:     senna 8.6 MG tablet, Take 1 tablet by mouth Daily., Disp: , Rfl:     The following portions of the patient's history  "were reviewed and updated as appropriate: allergies, current medications, past family history, past medical history, past social history, past surgical history and problem list.    ROS  Review of Systems   14 point ROS negative except for that listed in the HPI.         Objective:     /64 (BP Location: Left arm, Patient Position: Sitting)   Pulse 62   Ht 157.5 cm (62\")   Wt 98.9 kg (218 lb)   SpO2 96%   BMI 39.87 kg/m²      Physical Exam  Constitutional: Patient appears well-developed and well-nourished.   HENT: HEENT exam unremarkable.   Neck: Neck supple. No JVD present. No carotid bruits.   Cardiovascular: Normal rate, regular rhythm and normal heart sounds.  1/6 systolic murmur  2+ symmetric pulses.   Pulmonary/Chest: Breath sounds normal. Does not exhibit tenderness.   Abdominal: Abdomen benign.   Musculoskeletal: Does not exhibit edema.   Neurological: Neurological exam unremarkable.   Vitals reviewed.    Data Review:   Lab Results   Component Value Date    GLUCOSE 84 09/04/2024    BUN 7 (L) 09/04/2024    CREATININE 0.54 (L) 09/04/2024    EGFR 104.2 09/04/2024    BCR 13.0 09/04/2024     09/04/2024    K 4.1 09/04/2024    CO2 25.0 09/04/2024    CALCIUM 9.5 09/04/2024    ALBUMIN 4.3 09/04/2024    AST 18 09/04/2024    ALT 20 09/04/2024     Lab Results   Component Value Date    CHOL 160 09/04/2024    TRIG 80 09/04/2024    HDL 45 09/04/2024     09/04/2024      Lab Results   Component Value Date    WBC 9.43 09/04/2024    RBC 4.21 09/04/2024    HGB 13.0 09/04/2024    HCT 39.3 09/04/2024    MCV 93.3 09/04/2024     09/04/2024     Lab Results   Component Value Date    HGBA1C 6.30 (H) 09/04/2024          ECG 12 Lead    Date/Time: 10/11/2024 12:13 PM  Performed by: Joan Grove APRN    Authorized by: Joan Grove APRN  Comparison: compared with previous ECG from 10/6/2023  Similar to previous ECG  Rhythm: sinus rhythm  BPM: 62    Clinical impression: normal ECG  Comments: QT/QTc " 382/387 MS             Advance Care Planning   ACP discussion was held with the patient during this visit. Patient has an advance directive (not in EMR), copy requested.           Assessment:      Diagnosis Plan   1. Murmur, heart  Last echo in 2021 showed mild AI and trace MR.  Patient asymptomatic with no worsening heart failure symptoms    Consider repeat echo for surveillance at follow-up next year      2. Lower extremity edema  Conservative management with low-sodium diet and compression stockings      3. Palpitations  Palpitations controlled on metoprolol tartrate      4. Essential hypertension  Hypertension controlled      5. Dyslipidemia  Continue Lipitor 80 mg daily      6. Tobacco abuse  Recommend smoking cessation        Plan:   Stable cardiac status.   Continue current medications.   Consider repeat echo at next visit for surveillance of mild AI and trace MR  FU in 12 MO, sooner as needed.  Thank you for allowing us to participate in the care of your patient.       LE Lora      Please note that portions of this note may have been completed with a voice recognition program. Efforts were made to edit the dictations, but occasionally words are mistranscribed.

## 2024-10-14 ENCOUNTER — TELEPHONE (OUTPATIENT)
Dept: FAMILY MEDICINE CLINIC | Facility: CLINIC | Age: 64
End: 2024-10-14
Payer: MEDICARE

## 2024-10-14 NOTE — TELEPHONE ENCOUNTER
Caller: GriderDee    Relationship: Self    Best call back number: [phone]     What medication are you requesting: COUGH MEDICATION, INHALER (PERLES OR SYRUP)    What are your current symptoms: COUGH    How long have you been experiencing symptoms: FEW DAYS     Have you had these symptoms before:    [x] Yes  [] No    Have you been treated for these symptoms before:   [x] Yes  [] No    If a prescription is needed, what is your preferred pharmacy and phone number: [facility]/PHARMACY #[id number] - [address] - [phone] [facility] [phone]      Additional notes: PATIENT CALLED REQUESTING COUGH MEDICATION AND INHALER TO HELP RELIEF HER COUGH. SHE STATED SHE HAS ISSUES BREATHING AND NEEDS SOMETHING TO TREAT HER SYMPTOMS. PLEASE ADVISE

## 2024-10-22 NOTE — PROGRESS NOTES
Blood pressure reasonably controlled for her age and comorbidities.  Is on multiple antihypertensive medications.  -- Will continue current medications.   [facility]    Encounter Date: 2021    Patient ID: Dee Grider is a 60 y.o. female.  : 1960     PCP: Sebastian Pal MD       Chief Complaint: Essential Hypertension      PROBLEM LIST:  1. Cardiac murmur:  a. Echocardiogram, 2018: EF 60%. LV diastolic dysfunction (grade I). Mild AI.   2. Hypertension.  3. Familial hypercholesterolemia.  4. Morbid obesity.  5. Tobacco abuse.  6. MANUEL.  7. Pre-diabetes.  8. Seasonal allergies.  9. GERD.    History of Present Illness  Patient presents today for an annual follow-up with a history of diastolic dysfunction, heart murmur, and cardiac risk factors. Since last visit, she is done well from a cardiac standpoint.  She has no recent complaint of exertional chest pain or dyspnea and orthopnea no PND no claudication or lower extremity edema.  She has no awareness of tachyarrhythmias, no dizziness or syncope.  She does not check her blood pressure regularly but states that it generally runs about 110 mmHg systolic.  Her last lipid panel was 2020 and was well managed at that time on her current medical regimen.  She reports having been evaluated at [place] emergency department earlier this week for bladder stone.  She had an echocardiogram earlier this morning, the final report remains pending however preliminary report is unremarkable.    Allergies   Allergen Reactions   • Aspirin Nausea And Vomiting         Current Outpatient Medications:   •  atorvastatin (LIPITOR) 80 MG tablet, TAKE 1 TABLET BY MOUTH EVERY DAY, Disp: 90 tablet, Rfl: 3  •  cephalexin (KEFLEX) 500 MG capsule, Take 500 mg by mouth 2 (Two) Times a Day. For 7 days- Started 2021, Disp: , Rfl:   •  Cetirizine HCl (ZYRTEC ALLERGY PO), Take  by mouth., Disp: , Rfl:   •  fluticasone (FLONASE) 50 MCG/ACT nasal spray, 2 sprays into the nostril(s) as directed by provider Daily., Disp: 3 bottle, Rfl: 1  •  ketorolac (TORADOL) 10 MG tablet, Take  "10 mg by mouth Every 6 (Six) Hours As Needed for Moderate Pain ., Disp: , Rfl:   •  metoprolol tartrate (LOPRESSOR) 25 MG tablet, Take 1 tablet by mouth 2 (Two) Times a Day., Disp: 60 tablet, Rfl: 12  •  ondansetron (ZOFRAN) 4 MG tablet, Take 4 mg by mouth Every 8 (Eight) Hours As Needed for Nausea or Vomiting., Disp: , Rfl:   •  pantoprazole (PROTONIX) 40 MG EC tablet, TAKE 1 TABLET BY MOUTH EVERY DAY, Disp: 90 tablet, Rfl: 0  •  rizatriptan (MAXALT) 10 MG tablet, PLEASE SEE ATTACHED FOR DETAILED DIRECTIONS, Disp: , Rfl: 5    The following portions of the patient's history were reviewed and updated as appropriate: allergies, current medications, past family history, past medical history, past social history, past surgical history and problem list.          Objective:     /66 (BP Location: Left arm, Patient Position: Sitting)   Pulse 74   Temp 96.8 °F (36 °C)   Ht 157.5 cm (62\")   Wt 102 kg (224 lb)   SpO2 94%   BMI 40.97 kg/m²      Constitutional:       Appearance: Well-developed.   Pulmonary:      Effort: Pulmonary effort is normal. No respiratory distress.      Breath sounds: Normal breath sounds. No wheezing. No rales.      Comments: Bases clear  Chest:      Chest wall: Not tender to palpatation.   Cardiovascular:      Normal rate. Regular rhythm.      Murmurs: There is no murmur.      No gallop. No click. No rub.   Pulses:     Intact distal pulses.   Musculoskeletal: Normal range of motion.           Data Review:   Lab date: 02/03/2021  • CMP: Glu 138, BUN 13, eGFR >60, Na 139, K 3.8, Cl 106, CO2 24, Ca 9.9, Alk Phos 120, AST 13, ALT 25  • CBC: WBC 13.6, RBC 4.83, HGB 14.9, HCT 45.6, MCV 94.4, MCH 30.8,     Lab Results   Component Value Date    GLUCOSE 90 09/29/2020    BUN 8 09/29/2020    CREATININE 0.60 09/29/2020    EGFRIFNONA 103 09/29/2020    EGFRIFAFRI 124 09/29/2020    BCR 13.3 09/29/2020    K 4.4 09/29/2020    CO2 25.8 09/29/2020    CALCIUM 9.6 09/29/2020    ALBUMIN 4.20 09/29/2020    " AST 15 09/29/2020    ALT 18 09/29/2020     Lab Results   Component Value Date    CHOL 166 09/29/2020    TRIG 144 09/29/2020    HDL 45 09/29/2020    LDL 92 09/29/2020      Lab Results   Component Value Date    WBC 8.15 09/29/2020    RBC 4.42 09/29/2020    HGB 13.7 09/29/2020    HCT 40.1 09/29/2020    MCV 90.7 09/29/2020     09/29/2020     Lab Results   Component Value Date    HGBA1C 6.22 (H) 09/29/2020        Procedures       Assessment:      Diagnosis Plan   1. Murmur, heart   stable echocardiographic findings.  No significant valvular heart disease   2. Essential hypertension   well managed on current medical regimen   3. Familial hypercholesterolemia   well managed on current medical regimen     Plan:     Continue current medications.   FU in 12 MO, sooner as needed.  Thank you for allowing us to participate in the care of your patient.         Electronically signed by KELI Hernandez, 02/05/21, 2:44 PM EST.      Please note that portions of this note may have been completed with a voice recognition program. Efforts were made to edit the dictations, but occasionally words are mistranscribed.

## 2024-12-12 ENCOUNTER — HOSPITAL ENCOUNTER (OUTPATIENT)
Dept: MRI IMAGING | Facility: HOSPITAL | Age: 64
Discharge: HOME | End: 2024-12-12
Admitting: FAMILY MEDICINE
Payer: MEDICARE

## 2024-12-12 DIAGNOSIS — R93.7 ABNORMAL MAGNETIC RESONANCE IMAGING OF LUMBAR SPINE: ICD-10-CM

## 2024-12-12 DIAGNOSIS — M54.16 CHRONIC LEFT-SIDED LUMBAR RADICULOPATHY: ICD-10-CM

## 2024-12-12 PROCEDURE — 72158 MRI LUMBAR SPINE W/O & W/DYE: CPT

## 2024-12-12 PROCEDURE — 25510000002 GADOBENATE DIMEGLUMINE 529 MG/ML SOLUTION: Performed by: FAMILY MEDICINE

## 2024-12-12 PROCEDURE — A9577 INJ MULTIHANCE: HCPCS | Performed by: FAMILY MEDICINE

## 2024-12-12 RX ADMIN — GADOBENATE DIMEGLUMINE 20 ML: 529 INJECTION, SOLUTION INTRAVENOUS at 08:59

## 2024-12-23 ENCOUNTER — TELEPHONE (OUTPATIENT)
Dept: FAMILY MEDICINE CLINIC | Facility: CLINIC | Age: 64
End: 2024-12-23
Payer: MEDICARE

## 2024-12-23 NOTE — TELEPHONE ENCOUNTER
[facility] [employer]        Called patient and left message for patient to return our call. Also sent letter to patient home with the below message.    Please the patient know that the MRI showed lumbar spondylosis with a 4 mm anterolisthesis of the L4-L5.  There is bilateral neuroforaminal narrowing at L3-L4, L4-L5, and L5-S1.  There is mild to moderate spinal canal stenosis at L4-L5.  There is a 12 mm faint T1 hypodense/STIR hyperintense lesion within the L4 vertebral body with suggestion of midline normal postcontrast enhancement.  This is somewhat nonspecific.  It may represent an atypical hemangioma.  It is recommended if there is a history of malignancy or suspicion of malignancy further evaluation with a nuclear bone scan may be considered to exclude a metastatic lesion.  I would recommend the patient see a neurosurgeon for further evaluation of back pain and the findings on her MRI.  They may be able to further guide decision on whether or not nuclear bone scan is needed.  If patient is agreeable this please let me know and I will place a referral to neurosurgery.  If patient would rather just proceed with the nuclear bone scan let me know and I can order this as well.  If patient would like to discuss with me at her upcoming visit that is okay as well.

## 2024-12-27 ENCOUNTER — OFFICE VISIT (OUTPATIENT)
Dept: FAMILY MEDICINE CLINIC | Facility: CLINIC | Age: 64
End: 2024-12-27
Payer: MEDICARE

## 2024-12-27 VITALS
HEIGHT: 64 IN | OXYGEN SATURATION: 97 % | WEIGHT: 215.6 LBS | TEMPERATURE: 98.4 F | DIASTOLIC BLOOD PRESSURE: 68 MMHG | BODY MASS INDEX: 36.81 KG/M2 | SYSTOLIC BLOOD PRESSURE: 110 MMHG | HEART RATE: 75 BPM

## 2024-12-27 DIAGNOSIS — R93.7 ABNORMAL MAGNETIC RESONANCE IMAGING OF LUMBAR SPINE: ICD-10-CM

## 2024-12-27 DIAGNOSIS — M25.50 ARTHRALGIA OF MULTIPLE JOINTS: ICD-10-CM

## 2024-12-27 DIAGNOSIS — R76.8 ANTI-RNP ANTIBODIES PRESENT: ICD-10-CM

## 2024-12-27 DIAGNOSIS — M54.12 CERVICAL RADICULOPATHY: Primary | ICD-10-CM

## 2024-12-27 DIAGNOSIS — M54.16 CHRONIC LEFT-SIDED LUMBAR RADICULOPATHY: ICD-10-CM

## 2024-12-27 PROCEDURE — 3074F SYST BP LT 130 MM HG: CPT | Performed by: FAMILY MEDICINE

## 2024-12-27 PROCEDURE — 1159F MED LIST DOCD IN RCRD: CPT | Performed by: FAMILY MEDICINE

## 2024-12-27 PROCEDURE — G2211 COMPLEX E/M VISIT ADD ON: HCPCS | Performed by: FAMILY MEDICINE

## 2024-12-27 PROCEDURE — 3078F DIAST BP <80 MM HG: CPT | Performed by: FAMILY MEDICINE

## 2024-12-27 PROCEDURE — 1160F RVW MEDS BY RX/DR IN RCRD: CPT | Performed by: FAMILY MEDICINE

## 2024-12-27 PROCEDURE — 99214 OFFICE O/P EST MOD 30 MIN: CPT | Performed by: FAMILY MEDICINE

## 2024-12-27 PROCEDURE — 1125F AMNT PAIN NOTED PAIN PRSNT: CPT | Performed by: FAMILY MEDICINE

## 2024-12-27 RX ORDER — DULOXETIN HYDROCHLORIDE 30 MG/1
60 CAPSULE, DELAYED RELEASE ORAL DAILY
COMMUNITY
Start: 2024-09-04 | End: 2024-12-27

## 2024-12-27 NOTE — ASSESSMENT & PLAN NOTE
Patient has chronic pain in the neck and back.  MRI shows a couple areas of spinal stenosis as well as neuroforaminal narrowing.  There is also an abnormal finding on the contrast MRI of her lumbar spine concerning for hemangioma versus possible malignancy.  Patient was given referral to neurosurgery for further evaluation and treatment.  She has referral pending to pain management but she has not tolerated steroid injections in the past as they increased her anxiety and make her irritable.  Hold off on pain management referral at this time.  Patient does not want to take Cymbalta due to concern over it making her anxiety and irritability worse.  Patient cannot take NSAIDs or gabapentin due to side effects.  Was offered Norco but patient politely declines and states she will continue to take Tylenol as needed.  RTC/ED precautions given.

## 2024-12-27 NOTE — PROGRESS NOTES
Dee Grider is a 64 y.o. female who presents today for Pain      Patient is here for follow-up on chronic pain. She did not take the Cymbalta due to concern over side effects. She had a hip injection with ortho and this helped but she did not tolerate the side effects from the steroid so does not want to do any more of these. Steroids make her very anxious and angry. She has tingling and numbness in the left arm and leg to the knee but this has not been worsening. She denies incontinence and saddle anesthesia.          Review of Systems   Constitutional:  Negative for fever and unexpected weight loss.   HENT:  Negative for congestion, ear pain and sore throat.    Eyes:  Negative for visual disturbance.   Respiratory:  Negative for cough, shortness of breath and wheezing.    Cardiovascular:  Negative for chest pain and palpitations.   Gastrointestinal:  Negative for abdominal pain, blood in stool, constipation, diarrhea, nausea, vomiting and GERD.   Endocrine: Negative for polydipsia and polyuria.   Genitourinary:  Negative for difficulty urinating.   Musculoskeletal:  Positive for arthralgias (Left hip and left upper extremity), back pain, myalgias and neck pain. Negative for joint swelling.   Skin:  Negative for rash and skin lesions.   Allergic/Immunologic: Negative for environmental allergies.   Neurological:  Positive for numbness. Negative for seizures and syncope.   Hematological:  Does not bruise/bleed easily.   Psychiatric/Behavioral:  Negative for suicidal ideas.         The following portions of the patient's history were reviewed and updated as appropriate: allergies, current medications, past family history, past medical history, past social history, past surgical history and problem list.    Current Outpatient Medications on File Prior to Visit   Medication Sig Dispense Refill    acetaminophen (TYLENOL) 500 MG tablet Take 1 tablet by mouth Every 6 (Six) Hours As Needed.      atorvastatin (LIPITOR) 80  "MG tablet TAKE 1 TABLET BY MOUTH EVERY DAY 90 tablet 3    metoprolol tartrate (LOPRESSOR) 50 MG tablet TAKE 1 TABLET BY MOUTH TWICE A  tablet 1    pantoprazole (PROTONIX) 40 MG EC tablet TAKE 1 TABLET BY MOUTH EVERY DAY 90 tablet 0    rizatriptan (MAXALT) 10 MG tablet Take 1 tablet by mouth 1 (One) Time As Needed.      senna 8.6 MG tablet Take 1 tablet by mouth Daily.      [DISCONTINUED] DULoxetine (CYMBALTA) 30 MG capsule Take 2 capsules by mouth Daily. (Patient not taking: Reported on 12/27/2024)       No current facility-administered medications on file prior to visit.       Allergies   Allergen Reactions    Aspirin Nausea And Vomiting    Gadobenate Other (See Comments)     States she has chills and feels like \"ice\" in her veins.    Sulindac Other (See Comments)    Gabapentin Anxiety    Ibuprofen GI Intolerance        Visit Vitals  /68   Pulse 75   Temp 98.4 °F (36.9 °C) (Infrared)   Ht 162.6 cm (64\")   Wt 97.8 kg (215 lb 9.6 oz)   SpO2 97%   BMI 37.01 kg/m²        Physical Exam  Constitutional:       General: She is not in acute distress.     Appearance: She is well-developed. She is not diaphoretic.   HENT:      Head: Atraumatic.   Cardiovascular:      Rate and Rhythm: Normal rate and regular rhythm.      Heart sounds: Murmur (chronic) heard.      No friction rub. No gallop.   Pulmonary:      Effort: Pulmonary effort is normal. No respiratory distress.      Breath sounds: Normal breath sounds. No stridor. No wheezing, rhonchi or rales.   Skin:     General: Skin is warm and dry.   Neurological:      Mental Status: She is alert and oriented to person, place, and time.   Psychiatric:         Behavior: Behavior normal.          Results for orders placed or performed in visit on 09/04/24   Comprehensive Metabolic Panel    Collection Time: 09/04/24  9:39 AM    Specimen: Blood   Result Value Ref Range    Glucose 84 65 - 99 mg/dL    BUN 7 (L) 8 - 23 mg/dL    Creatinine 0.54 (L) 0.57 - 1.00 mg/dL    Sodium " 141 136 - 145 mmol/L    Potassium 4.1 3.5 - 5.2 mmol/L    Chloride 105 98 - 107 mmol/L    CO2 25.0 22.0 - 29.0 mmol/L    Calcium 9.5 8.6 - 10.5 mg/dL    Total Protein 7.1 6.0 - 8.5 g/dL    Albumin 4.3 3.5 - 5.2 g/dL    ALT (SGPT) 20 1 - 33 U/L    AST (SGOT) 18 1 - 32 U/L    Alkaline Phosphatase 103 39 - 117 U/L    Total Bilirubin 0.3 0.0 - 1.2 mg/dL    Globulin 2.8 gm/dL    A/G Ratio 1.5 g/dL    BUN/Creatinine Ratio 13.0 7.0 - 25.0    Anion Gap 11.0 5.0 - 15.0 mmol/L    eGFR 104.2 >60.0 mL/min/1.73   Lipid Panel    Collection Time: 09/04/24  9:39 AM    Specimen: Blood   Result Value Ref Range    Total Cholesterol 160 0 - 200 mg/dL    Triglycerides 80 0 - 150 mg/dL    HDL Cholesterol 45 40 - 60 mg/dL    LDL Cholesterol  100 0 - 100 mg/dL    VLDL Cholesterol 15 5 - 40 mg/dL    LDL/HDL Ratio 2.20    Hemoglobin A1c    Collection Time: 09/04/24  9:39 AM    Specimen: Blood   Result Value Ref Range    Hemoglobin A1C 6.30 (H) 4.80 - 5.60 %   RONALDO Direct Reflex to 11 Biomarker    Collection Time: 09/04/24  9:39 AM    Specimen: Blood   Result Value Ref Range    RONALDO Direct Positive (A) Negative   Rheumatoid Factor    Collection Time: 09/04/24  9:39 AM    Specimen: Blood   Result Value Ref Range    Rheumatoid Factor Quantitative <10.0 0.0 - 14.0 IU/mL   Uric acid    Collection Time: 09/04/24  9:39 AM    Specimen: Blood   Result Value Ref Range    Uric Acid 5.2 2.4 - 5.7 mg/dL   Sedimentation rate, automated    Collection Time: 09/04/24  9:39 AM    Specimen: Blood   Result Value Ref Range    Sed Rate 21 0 - 30 mm/hr   C-reactive protein    Collection Time: 09/04/24  9:39 AM    Specimen: Blood   Result Value Ref Range    C-Reactive Protein 0.41 0.00 - 0.50 mg/dL   CBC Auto Differential    Collection Time: 09/04/24  9:39 AM    Specimen: Blood   Result Value Ref Range    WBC 9.43 3.40 - 10.80 10*3/mm3    RBC 4.21 3.77 - 5.28 10*6/mm3    Hemoglobin 13.0 12.0 - 15.9 g/dL    Hematocrit 39.3 34.0 - 46.6 %    MCV 93.3 79.0 - 97.0 fL     MCH 30.9 26.6 - 33.0 pg    MCHC 33.1 31.5 - 35.7 g/dL    RDW 12.8 12.3 - 15.4 %    RDW-SD 43.4 37.0 - 54.0 fl    MPV 10.5 6.0 - 12.0 fL    Platelets 257 140 - 450 10*3/mm3    Neutrophil % 48.6 42.7 - 76.0 %    Lymphocyte % 39.2 19.6 - 45.3 %    Monocyte % 6.3 5.0 - 12.0 %    Eosinophil % 4.9 0.3 - 6.2 %    Basophil % 0.7 0.0 - 1.5 %    Immature Grans % 0.3 0.0 - 0.5 %    Neutrophils, Absolute 4.58 1.70 - 7.00 10*3/mm3    Lymphocytes, Absolute 3.70 (H) 0.70 - 3.10 10*3/mm3    Monocytes, Absolute 0.59 0.10 - 0.90 10*3/mm3    Eosinophils, Absolute 0.46 (H) 0.00 - 0.40 10*3/mm3    Basophils, Absolute 0.07 0.00 - 0.20 10*3/mm3    Immature Grans, Absolute 0.03 0.00 - 0.05 10*3/mm3    nRBC 0.0 0.0 - 0.2 /100 WBC   RONALDO Comprehensive Plus Profile    Collection Time: 09/04/24  9:39 AM    Specimen: Blood   Result Value Ref Range    Anti-DNA (DS) Ab Qn 1 0 - 9 IU/mL    RNP Antibodies 1.9 (H) 0.0 - 0.9 AI    Butcher Antibodies <0.2 0.0 - 0.9 AI    Butcher/RNP Antibodies <0.2 0.0 - 0.9 AI    Antiscleroderma-70 Antibodies <0.2 0.0 - 0.9 AI    Sjogren's Anti-SS-A <0.2 0.0 - 0.9 AI    Sjogren's Anti-SS-B <0.2 0.0 - 0.9 AI    Antichromatin Antibodies <0.2 0.0 - 0.9 AI    Antiribosomal P Antibodies <0.2 0.0 - 0.9 AI    HOLLIS-1 IgG <0.2 0.0 - 0.9 AI    Anti-Centromere B Antibodies <0.2 0.0 - 0.9 AI    See below: Comment         Problems Addressed this Visit          Musculoskeletal and Injuries    Arthralgia of multiple joints     Patient has pain in multiple joints around her body.  She is positive for anti-RNP antibodies.  Due to the positive test as well as chronic pain in multiple joints throughout her body patient was given referral to rheumatology for further evaluation and treatment.         Relevant Orders    Ambulatory Referral to Rheumatology       Neuro    Cervical radiculopathy - Primary     Patient has chronic pain in the neck and back.  MRI shows a couple areas of spinal stenosis as well as neuroforaminal narrowing.  There is  also an abnormal finding on the contrast MRI of her lumbar spine concerning for hemangioma versus possible malignancy.  Patient was given referral to neurosurgery for further evaluation and treatment.  She has referral pending to pain management but she has not tolerated steroid injections in the past as they increased her anxiety and make her irritable.  Hold off on pain management referral at this time.  Patient does not want to take Cymbalta due to concern over it making her anxiety and irritability worse.  Patient cannot take NSAIDs or gabapentin due to side effects.  Was offered Norco but patient politely declines and states she will continue to take Tylenol as needed.  RTC/ED precautions given.         Relevant Orders    Ambulatory Referral to Neurosurgery       Other    Chronic left-sided lumbar radiculopathy    Relevant Orders    Ambulatory Referral to Neurosurgery     Other Visit Diagnoses       Abnormal magnetic resonance imaging of lumbar spine        Relevant Orders    Ambulatory Referral to Neurosurgery    Anti-RNP antibodies present        Relevant Orders    Ambulatory Referral to Rheumatology          Diagnoses         Codes Comments    Cervical radiculopathy    -  Primary ICD-10-CM: M54.12  ICD-9-CM: 723.4     Chronic left-sided lumbar radiculopathy     ICD-10-CM: M54.16  ICD-9-CM: 724.4     Abnormal magnetic resonance imaging of lumbar spine     ICD-10-CM: R93.7  ICD-9-CM: 793.7     Arthralgia of multiple joints     ICD-10-CM: M25.50  ICD-9-CM: 719.49     Anti-RNP antibodies present     ICD-10-CM: R76.8  ICD-9-CM: 795.79             Return in about 3 months (around 3/27/2025) for Follow-up HTN, prediabetes, and chronic pain.    30 minutes was spent on this patient encounter which included history taking, physical exam, answering patient questions, counseling, discussing treatment plan, placing orders, and documentation.    Sebastian Pal MD   12/27/2024

## 2024-12-27 NOTE — ASSESSMENT & PLAN NOTE
Patient has pain in multiple joints around her body.  She is positive for anti-RNP antibodies.  Due to the positive test as well as chronic pain in multiple joints throughout her body patient was given referral to rheumatology for further evaluation and treatment.

## 2025-01-09 ENCOUNTER — OFFICE VISIT (OUTPATIENT)
Dept: NEUROSURGERY | Facility: CLINIC | Age: 65
End: 2025-01-09
Payer: MEDICARE

## 2025-01-09 VITALS — HEIGHT: 62 IN | WEIGHT: 220 LBS | BODY MASS INDEX: 40.48 KG/M2 | TEMPERATURE: 97.1 F

## 2025-01-09 DIAGNOSIS — R20.0 PAIN AND NUMBNESS OF LEFT UPPER EXTREMITY: Primary | ICD-10-CM

## 2025-01-09 DIAGNOSIS — M79.605 PAIN OF LEFT LOWER EXTREMITY: ICD-10-CM

## 2025-01-09 DIAGNOSIS — M79.602 PAIN AND NUMBNESS OF LEFT UPPER EXTREMITY: Primary | ICD-10-CM
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
## 2025-01-14 DIAGNOSIS — I10 ESSENTIAL HYPERTENSION: ICD-10-CM

## 2025-01-15 RX ORDER — METOPROLOL TARTRATE 50 MG
TABLET ORAL
Qty: 180 TABLET | Refills: 1 | Status: SHIPPED | OUTPATIENT
Start: 2025-01-15

## 2025-01-22 RX ORDER — SODIUM, POTASSIUM,MAG SULFATES 17.5-3.13G
1 SOLUTION, RECONSTITUTED, ORAL ORAL TAKE AS DIRECTED
Qty: 354 ML | Refills: 0 | Status: SHIPPED | OUTPATIENT
Start: 2025-01-22

## 2025-02-06 ENCOUNTER — OUTSIDE FACILITY SERVICE (OUTPATIENT)
Dept: GASTROENTEROLOGY | Facility: CLINIC | Age: 65
End: 2025-02-06
Payer: MEDICARE

## 2025-02-06 PROCEDURE — 88305 TISSUE EXAM BY PATHOLOGIST: CPT | Performed by: INTERNAL MEDICINE

## 2025-02-06 PROCEDURE — 45385 COLONOSCOPY W/LESION REMOVAL: CPT | Performed by: INTERNAL MEDICINE

## 2025-02-07 ENCOUNTER — LAB REQUISITION (OUTPATIENT)
Dept: LAB | Facility: HOSPITAL | Age: 65
End: 2025-02-07
Payer: MEDICARE

## 2025-02-07 DIAGNOSIS — Z12.11 ENCOUNTER FOR SCREENING FOR MALIGNANT NEOPLASM OF COLON: ICD-10-CM

## 2025-03-18 DIAGNOSIS — K21.00 CHRONIC REFLUX ESOPHAGITIS: ICD-10-CM

## 2025-03-18 RX ORDER — PANTOPRAZOLE SODIUM 40 MG/1
40 TABLET, DELAYED RELEASE ORAL DAILY
Qty: 90 TABLET | Refills: 0 | Status: SHIPPED | OUTPATIENT
Start: 2025-03-18

## 2025-03-27 ENCOUNTER — OFFICE VISIT (OUTPATIENT)
Dept: FAMILY MEDICINE CLINIC | Facility: CLINIC | Age: 65
End: 2025-03-27
Payer: MEDICARE

## 2025-03-27 VITALS
WEIGHT: 213 LBS | OXYGEN SATURATION: 96 % | TEMPERATURE: 96.5 F | SYSTOLIC BLOOD PRESSURE: 132 MMHG | HEART RATE: 73 BPM | DIASTOLIC BLOOD PRESSURE: 80 MMHG | BODY MASS INDEX: 39.2 KG/M2 | HEIGHT: 62 IN

## 2025-03-27 DIAGNOSIS — I10 ESSENTIAL HYPERTENSION: Primary | ICD-10-CM

## 2025-03-27 DIAGNOSIS — E78.01 FAMILIAL HYPERCHOLESTEROLEMIA: ICD-10-CM

## 2025-03-27 RX ORDER — CYCLOBENZAPRINE HCL 10 MG
1 TABLET ORAL 2 TIMES DAILY PRN
COMMUNITY
End: 2025-03-27

## 2025-03-27 RX ORDER — METOPROLOL TARTRATE 50 MG
50 TABLET ORAL 2 TIMES DAILY
Qty: 180 TABLET | Refills: 1 | Status: SHIPPED | OUTPATIENT
Start: 2025-03-27

## 2025-03-27 RX ORDER — BACLOFEN 10 MG/1
TABLET ORAL
COMMUNITY
End: 2025-03-27

## 2025-03-27 RX ORDER — ATORVASTATIN CALCIUM 80 MG/1
80 TABLET, FILM COATED ORAL DAILY
Qty: 90 TABLET | Refills: 3 | Status: SHIPPED | OUTPATIENT
Start: 2025-03-27

## 2025-03-27 RX ORDER — NORTRIPTYLINE HYDROCHLORIDE 10 MG/1
CAPSULE ORAL
COMMUNITY
End: 2025-03-27

## 2025-03-27 NOTE — PROGRESS NOTES
Dee Grider is a 64 y.o. female who presents today for Hypertension, Prediabetes, and Hyperlipidemia      HPI     She has been taking metoprolol and atorvastatin as directed and tolerated these well. She eats a generally healthy diet and cooks most meals at home. She has not been exercising 2/2 pain.     Review of Systems   Constitutional:  Negative for fever and unexpected weight loss.   HENT:  Negative for congestion, ear pain and sore throat.    Eyes:  Negative for visual disturbance.   Respiratory:  Negative for cough, shortness of breath and wheezing.    Cardiovascular:  Negative for chest pain and palpitations.   Gastrointestinal:  Negative for abdominal pain, blood in stool, constipation, diarrhea, nausea, vomiting and GERD.   Endocrine: Negative for polydipsia and polyuria.   Genitourinary:  Negative for difficulty urinating.   Musculoskeletal:  Positive for arthralgias (Left hip and left upper extremity), back pain, myalgias and neck pain. Negative for joint swelling.   Skin:  Negative for rash and skin lesions.   Allergic/Immunologic: Negative for environmental allergies.   Neurological:  Positive for numbness. Negative for seizures and syncope.   Hematological:  Does not bruise/bleed easily.   Psychiatric/Behavioral:  Negative for suicidal ideas.         The following portions of the patient's history were reviewed and updated as appropriate: allergies, current medications, past family history, past medical history, past social history, past surgical history and problem list.    Current Outpatient Medications on File Prior to Visit   Medication Sig Dispense Refill    acetaminophen (TYLENOL) 500 MG tablet Take 1 tablet by mouth Every 6 (Six) Hours As Needed.      Magnesium 200 MG chewable tablet Chew.      pantoprazole (PROTONIX) 40 MG EC tablet TAKE 1 TABLET BY MOUTH EVERY DAY 90 tablet 0    rizatriptan (MAXALT) 10 MG tablet Take 1 tablet by mouth 1 (One) Time As Needed.      senna 8.6 MG tablet Take 1  "tablet by mouth Daily.       No current facility-administered medications on file prior to visit.       Allergies   Allergen Reactions    Aspirin Nausea And Vomiting    Gadobenate Other (See Comments)     States she has chills and feels like \"ice\" in her veins.    Sulindac Other (See Comments)    Gabapentin Anxiety    Ibuprofen GI Intolerance        Visit Vitals  /80 (BP Location: Left arm, Patient Position: Sitting, Cuff Size: Adult)   Pulse 73   Temp 96.5 °F (35.8 °C) (Infrared)   Ht 157.5 cm (62\")   Wt 96.6 kg (213 lb)   SpO2 96%   BMI 38.96 kg/m²        Physical Exam  Constitutional:       General: She is not in acute distress.     Appearance: She is well-developed. She is not diaphoretic.   HENT:      Head: Atraumatic.   Cardiovascular:      Rate and Rhythm: Normal rate and regular rhythm.      Heart sounds: Murmur (chronic) heard.      No friction rub. No gallop.   Pulmonary:      Effort: Pulmonary effort is normal. No respiratory distress.      Breath sounds: Normal breath sounds. No stridor. No wheezing, rhonchi or rales.   Skin:     General: Skin is warm and dry.   Neurological:      Mental Status: She is alert and oriented to person, place, and time.   Psychiatric:         Behavior: Behavior normal.          Results for orders placed or performed in visit on 02/06/25   Tissue Pathology Exam    Collection Time: 02/06/25 12:04 PM    Specimen: Large Intestine, Right / Ascending Colon; Tissue   Result Value Ref Range    Case Report       Surgical Pathology Report                         Case: [id number]                                  Authorizing Provider:  Toño Goldstein MD    Collected:           02/06/2025 12:04 PM          Ordering Location:     [facility]   Received:            02/07/2025 09:52 AM                                 LABORATORY                                                                   Pathologist:           Flakito Urbina MD                                " "                        Specimen:    Large Intestine, Right / Ascending Colon                                                   Clinical Information       Encounter for screening for malignant neoplasm of colon      Final Diagnosis       ASCENDING COLON POLYP:  Tubular adenoma.  No high-grade dysplasia identified.        Gross Description       1. Large Intestine, Right / Ascending Colon.  Received in formalin labeled \"ascending colon polyp\" is a 0.2 x 0.2 x 0.1 cm single tan tissue fragment, submitted entirely in a single cassette.  LDP        Microscopic Description       The slides are reviewed and demonstrate histopathologic features supporting the above rendered diagnosis.            Problems Addressed this Visit          Cardiac and Vasculature    Essential hypertension - Primary    Hypertension is stable and controlled  Continue current treatment regimen.  Blood pressure will be reassessed in 6 months.         Relevant Medications    metoprolol tartrate (LOPRESSOR) 50 MG tablet    Familial hypercholesterolemia     Lipid abnormalities are stable    Plan:  Continue same medication/s without change.      Discussed medication dosage, use, side effects, and goals of treatment in detail.    Counseled patient on lifestyle modifications to help control hyperlipidemia.     Patient Treatment Goals:   LDL goal is under 100    Followup in 6 months.         Relevant Medications    atorvastatin (LIPITOR) 80 MG tablet     Diagnoses         Codes Comments      Essential hypertension    -  Primary ICD-10-CM: I10  ICD-9-CM: 401.9       Familial hypercholesterolemia     ICD-10-CM: E78.01  ICD-9-CM: 272.0             Return for Next scheduled follow up.    Sebastian Pal MD   3/28/2025            "

## 2025-05-09 ENCOUNTER — OFFICE VISIT (OUTPATIENT)
Age: 65
End: 2025-05-09
Payer: MEDICARE

## 2025-05-09 ENCOUNTER — LAB (OUTPATIENT)
Facility: HOSPITAL | Age: 65
End: 2025-05-09
Payer: MEDICARE

## 2025-05-09 VITALS
HEIGHT: 62 IN | HEART RATE: 75 BPM | TEMPERATURE: 97.5 F | SYSTOLIC BLOOD PRESSURE: 118 MMHG | BODY MASS INDEX: 39.4 KG/M2 | WEIGHT: 214.1 LBS | DIASTOLIC BLOOD PRESSURE: 78 MMHG

## 2025-05-09 DIAGNOSIS — M25.50 ARTHRALGIA, UNSPECIFIED JOINT: ICD-10-CM

## 2025-05-09 DIAGNOSIS — R76.8 ANA POSITIVE: ICD-10-CM

## 2025-05-09 DIAGNOSIS — M25.50 ARTHRALGIA, UNSPECIFIED JOINT: Primary | ICD-10-CM

## 2025-05-09 DIAGNOSIS — R89.9 ABNORMAL LABORATORY TEST: ICD-10-CM

## 2025-05-09 DIAGNOSIS — M15.0 PRIMARY OSTEOARTHRITIS INVOLVING MULTIPLE JOINTS: ICD-10-CM

## 2025-05-09 DIAGNOSIS — R53.83 FATIGUE, UNSPECIFIED TYPE: ICD-10-CM

## 2025-05-09 DIAGNOSIS — M15.0 PRIMARY OSTEOARTHRITIS INVOLVING MULTIPLE JOINTS: Chronic | ICD-10-CM

## 2025-05-09 DIAGNOSIS — M79.7 FIBROMYALGIA: Chronic | ICD-10-CM

## 2025-05-09 LAB
BACTERIA UR QL AUTO: ABNORMAL /HPF
BILIRUB UR QL STRIP: NEGATIVE
CLARITY UR: CLEAR
COLOR UR: YELLOW
GLUCOSE UR STRIP-MCNC: NEGATIVE MG/DL
HGB UR QL STRIP.AUTO: NEGATIVE
HOLD SPECIMEN: NORMAL
HYALINE CASTS UR QL AUTO: ABNORMAL /LPF
KETONES UR QL STRIP: NEGATIVE
LEUKOCYTE ESTERASE UR QL STRIP.AUTO: ABNORMAL
NITRITE UR QL STRIP: NEGATIVE
PH UR STRIP.AUTO: 5.5 [PH] (ref 5–8)
PROT UR QL STRIP: NEGATIVE
RBC # UR STRIP: ABNORMAL /HPF
REF LAB TEST METHOD: ABNORMAL
SP GR UR STRIP: 1.02 (ref 1–1.03)
SQUAMOUS #/AREA URNS HPF: ABNORMAL /HPF
UROBILINOGEN UR QL STRIP: ABNORMAL
WBC # UR STRIP: ABNORMAL /HPF

## 2025-05-09 PROCEDURE — 81001 URINALYSIS AUTO W/SCOPE: CPT

## 2025-05-09 PROCEDURE — 86038 ANTINUCLEAR ANTIBODIES: CPT

## 2025-05-09 PROCEDURE — 86800 THYROGLOBULIN ANTIBODY: CPT

## 2025-05-09 PROCEDURE — 85025 COMPLETE CBC W/AUTO DIFF WBC: CPT

## 2025-05-09 PROCEDURE — 84443 ASSAY THYROID STIM HORMONE: CPT

## 2025-05-09 PROCEDURE — 82550 ASSAY OF CK (CPK): CPT

## 2025-05-09 PROCEDURE — 83516 IMMUNOASSAY NONANTIBODY: CPT

## 2025-05-09 PROCEDURE — 82085 ASSAY OF ALDOLASE: CPT

## 2025-05-09 PROCEDURE — 83520 IMMUNOASSAY QUANT NOS NONAB: CPT

## 2025-05-09 PROCEDURE — 36415 COLL VENOUS BLD VENIPUNCTURE: CPT

## 2025-05-09 PROCEDURE — 85652 RBC SED RATE AUTOMATED: CPT

## 2025-05-09 PROCEDURE — 80053 COMPREHEN METABOLIC PANEL: CPT

## 2025-05-09 PROCEDURE — 86146 BETA-2 GLYCOPROTEIN ANTIBODY: CPT

## 2025-05-09 PROCEDURE — 84439 ASSAY OF FREE THYROXINE: CPT

## 2025-05-09 PROCEDURE — 80074 ACUTE HEPATITIS PANEL: CPT

## 2025-05-09 PROCEDURE — 86140 C-REACTIVE PROTEIN: CPT

## 2025-05-09 PROCEDURE — 86037 ANCA TITER EACH ANTIBODY: CPT

## 2025-05-09 PROCEDURE — 86376 MICROSOMAL ANTIBODY EACH: CPT

## 2025-05-09 PROCEDURE — 86431 RHEUMATOID FACTOR QUANT: CPT

## 2025-05-09 PROCEDURE — 86480 TB TEST CELL IMMUN MEASURE: CPT

## 2025-05-09 NOTE — PROGRESS NOTES
Office Visit       Date: 05/09/2025   Patient Name: Dee Grider  MRN: [id number]  YOB: 1960    Referring Physician: Sebastian Pal MD     Chief Complaint   Patient presents with   • Joint Pain   • Osteoarthritis   • Abnormal Lab     +RNP Test & + RONALDO Test    • Fibromyalgia       History of Present Illness: Dee Grider is a 64 y.o. female who is here today at the request of Dr. Pal. The referral indicates that she has joint pain and a +RNP test. Her RONALDO test was also positive.     She has previously been diagnose with fibromyalgia.     Today she rates her pain as 8/10 in severity. She has 30 minutes/day of morning stiffness. No red or hot joints. She has back and neck pain. Her neck is stiff. She has trouble walking. Her joints swell. She has muscle weakness. No muscle pain. She reports she has had pain for > 20 years. Her pain is constant.     She has dry mouth. No dry eyes. She is fatigued. She gets mouth sores. No shortness of breath. No chest pain. She is constipated. She has increased urinary urgency. No pain with urination. She has headaches. She has numbness in the extremities. She notes hair loss. No rash. No lymphadenopathy. No abnormal bruising/bleeding.     Medication/treatment/interventions tried include: Tylenol, she has seen orthopaedic surgeons, she had shoulder surgery, She has seen neurosurgery, Epidural injection, Trochanteric bursa injection, she has done some physical therapy, she has seen neurology, sulindac (allergic/intolerant), gabapentin (allergic/intolerant), ibuprofen (GI intolerance), She has had knee injections, she has seen psychiatry, Tumeric, Cymbalta   Studies reviewed included:   9/4/24: RNP 1.9 (<0.9 normal), DS DNA normal, Butcher normal, Sm/RNP normal, SCL 70 normal, SSA and SSB normal, chromatin normal, Ribosomal P normal, Angie 1 normal, Centromere normal, Direct RONALDO Positive, CMP was fine, RF negative, uric acid 5.2, Sed rate normal,  CRP normal, CBC was fine          MRI LUMBAR SPINE W WO CONTRAST     Date of Exam: 12/12/2024 8:18 AM EST     Indication: Nonspecific rounded 1.6 cm x 1.7 cm T1 and T2 hypointensity of the L4 vertebral body..     Comparison: Lumbar spine MRI dated 9/22/2024     Technique:  Routine multiplanar/multi sequence sequence images of the lumbar spine were obtained before and after the uneventful administration of 20 mL Multihance.          FINDINGS:  There is 4 mm anterolisthesis at L4-L5. 12 mm faint T1 hypointense/STIR hyperintense lesion within the L4 vertebral body with suggestion of minimal post contrast enhancement. Remainder of the visualized marrow signal is unremarkable. Vertebral body   heights are normal. L5-S1 disc is desiccated. The conus terminates at approximately the L2 level. No abnormal signal is identified within the conus. The visualized paraspinal soft tissues are without significant abnormality. Limited visualization of the   intra-abdominal structures is unremarkable.     T12-L1: No significant spinal canal or neural foraminal stenosis.     L1-L2: No significant spinal canal or neural foraminal stenosis.     L2-L3: No significant spinal canal or neural foraminal stenosis.     L3-L4: Mild broad-based disc bulging. Mild bilateral neural foraminal narrowing. Spinal canal is not significantly narrowed.     L4-L5: 4 mm anterolisthesis with broad-based disc bulge and bilateral facet arthropathy. There is mild to moderate spinal canal stenosis and mild bilateral neural foraminal narrowing.     L5-S1: Broad-based disc osteophyte complex and bilateral facet arthropathy. There is moderate bilateral neural foraminal stenosis. Spinal canal is not significantly narrowed.     IMPRESSION:  1.Lumbar spondylosis with 4 mm anterolisthesis at L4-L5. There is bilateral neural foraminal narrowing at L3-L4, L4-L5, and L5-S1. There is mild to moderate spinal canal stenosis at L4-L5. Please see above for level by level  description.  2.12 mm faint T1 hypointense/STIR hyperintense lesion within the L4 vertebral body with suggestion of minimal post contrast enhancement. MR appearance is somewhat nonspecific. This may represent an atypical hemangioma. If patient has known history of   malignancy or suspicion for malignancy, further evaluation with nuclear bone scan may be considered to exclude a metastatic lesion.        Electronically Signed: Alvaro Chapman MD    12/13/2024 8:39 AM EST    Workstation ID: [id number]    MRI CERVICAL SPINE WO CONTRAST     Date of Exam: 9/22/2024 8:02 AM EDT     Indication: cervical radiculopathy. Left-sided radiating pain.     Comparison: None available.     Technique:  Routine multiplanar/multi sequence sequence images of the cervical spine were obtained without contrast administration.          Findings:  There are normal vertebral body heights. There is straightening of curvature with mild reversal of curvature centered at C5-6.     C2-3 C3-4, and C4-5 disc interspaces appear well maintained.     There is disc bulge with mild flattening of the anterior spinal cord at C5-6 and mild relative spinal stenosis. Neural foramina appear patent.     There is mild narrowing of the C6/7 disc with mild disc bulge which is mildly asymmetric and greatest towards the left. There is no significant spinal stenosis. There is moderately severe left neural foraminal narrowing.     C7-T1 disc is maintained.     IMPRESSION:  Mild cord impingement and mild spinal stenosis C5-6. Left neural foraminal stenosis C6/7.           Electronically Signed: Martina Dominguez MD    9/23/2024 11:23 AM EDT    Workstation ID: [id number]      Subjective     Review of Systems   Constitutional:  Positive for chills and fatigue.   HENT:  Positive for dental problem (dry mouth), ear pain, mouth sores, postnasal drip, sinus pressure, sneezing and tinnitus.    Eyes:  Positive for discharge and visual disturbance.   Respiratory:  Positive for apnea.     Cardiovascular: Negative.    Gastrointestinal:  Positive for anal bleeding, constipation, GERD and indigestion.   Endocrine: Positive for cold intolerance and heat intolerance.   Genitourinary:  Positive for decreased libido and urgency.   Musculoskeletal:  Positive for arthralgias, back pain, gait problem, joint swelling, neck pain and neck stiffness.   Skin: Negative.    Allergic/Immunologic: Positive for environmental allergies.   Neurological:  Positive for dizziness, weakness, light-headedness, numbness, headache and memory problem.   Hematological: Negative.    Psychiatric/Behavioral:  Positive for depressed mood. The patient is nervous/anxious.    All other systems reviewed and are negative.       Past Medical History:   Diagnosis Date   • Bladder stone    • Colon polyps    • DDD (degenerative disc disease), lumbar    • TATYANA (generalized anxiety disorder)    • GERD (gastroesophageal reflux disease)    • Heart murmur    • Hemorrhoids    • HTN (hypertension)    • Hyperlipidemia    • Kidney stones    • Migraine headache    • Morbid obesity    • MANUEL (obstructive sleep apnea)    • Osteoarthritis of both knees    • Phantom limb pain    • Rheumatic fever    • Seasonal allergies    • Tobacco dependence        Past Surgical History:   Procedure Laterality Date   • ARM AMPUTATION Right 2006   • COLONOSCOPY  10/01/2018   • ENDOSCOPY N/A 11/8/2019    Procedure: esophagogastroduodenoscopy with biopsies;  Surgeon: Toño Goldstein MD;  Location: [facility] ENDOSCOPY;  Service: Gastroenterology   • HYSTERECTOMY  2004   • SHOULDER ARTHROSCOPY Left 2011   • TONSILLECTOMY  1973   • UPPER GASTROINTESTINAL ENDOSCOPY  2014       Family History   Problem Relation Age of Onset   • Diabetes Mother    • Hypertension Mother    • Thyroid disease Mother    • Lung cancer Father    • Diabetes Sister    • Hyperlipidemia Sister    • Cancer Sister 50        breast   • Bone cancer Sister    • CHIDI disease Sister    • Hyperlipidemia Sister   "  • Hypertension Brother    • No Known Problems Brother    • No Known Problems Brother         Lots of joint pains   • No Known Problems Daughter    • Seizures Son    • Colon cancer Maternal Aunt    • No Known Problems Grandson    • No Known Problems Granddaughter        Social History     Socioeconomic History   • Marital status:      Spouse name: N/A   • Number of children: 0   • Years of education: H.S.   • Highest education level: High school graduate   Tobacco Use   • Smoking status: Every Day     Current packs/day: 0.25     Average packs/day: 0.3 packs/day for 11.4 years (2.8 ttl pk-yrs)     Types: Cigarettes     Start date: 2014     Passive exposure: Current   • Smokeless tobacco: Never   Vaping Use   • Vaping status: Never Used   Substance and Sexual Activity   • Alcohol use: Not Currently     Alcohol/week: 2.0 standard drinks of alcohol     Types: 1 Cans of beer, 1 Shots of liquor per week   • Drug use: No   • Sexual activity: Not Currently     Partners: Male         Current Outpatient Medications:   •  acetaminophen (TYLENOL) 500 MG tablet, Take 1 tablet by mouth Every 6 (Six) Hours As Needed., Disp: , Rfl:   •  atorvastatin (LIPITOR) 80 MG tablet, Take 1 tablet by mouth Daily., Disp: 90 tablet, Rfl: 3  •  Magnesium 200 MG chewable tablet, Chew., Disp: , Rfl:   •  metoprolol tartrate (LOPRESSOR) 50 MG tablet, Take 1 tablet by mouth 2 (Two) Times a Day., Disp: 180 tablet, Rfl: 1  •  pantoprazole (PROTONIX) 40 MG EC tablet, TAKE 1 TABLET BY MOUTH EVERY DAY, Disp: 90 tablet, Rfl: 0  •  rizatriptan (MAXALT) 10 MG tablet, Take 1 tablet by mouth 1 (One) Time As Needed., Disp: , Rfl:   •  senna 8.6 MG tablet, Take 1 tablet by mouth Daily., Disp: , Rfl:     Allergies   Allergen Reactions   • Aspirin Nausea And Vomiting   • Gadobenate Other (See Comments)     States she has chills and feels like \"ice\" in her veins.   • Sulindac Other (See Comments)   • Gabapentin Anxiety   • Ibuprofen GI Intolerance       I " "reviewed the patient's chief complaint, history of present illness, review of systems, past medical history, surgical history, family history, social history, medications and allergy list.     Objective      Vitals:    05/09/25 1031   BP: 118/78   BP Location: Left arm   Patient Position: Sitting   Cuff Size: Large Adult   Pulse: 75   Temp: 97.5 °F (36.4 °C)   Weight: 97.1 kg (214 lb 1.6 oz)   Height: 157.5 cm (62.01\")   PainSc: 8      Body mass index is 39.15 kg/m².       Physical Exam     General: Well appearing 64 year old female. Not in distress. She is ambulating unassisted.   SKIN: No rashes. No alopecia. No subcutaneous nodules. No digital pits or ulcers. No sclerodactyly.   HEENT: NCAT. Conjunctiva clear, no photophobia. No oral or nasal ulcers. Hearing intact.    Pulmonary: Clear to auscultation bilaterally. No wheezing, rales, or rhonchi.  CV: Regular rate and rhythm. No murmurs, rubs, or gallops.   Psych: Normal mood and affect. Alert and oriented x 3.   Extremities: No cyanosis or edema.   Musculoskeletal: No joint swelling. She has myofascial tender points.  No warmth or erythema.    Lymph: No palpable cervical adenopathy      Procedures    Assessment / Plan      Assessment & Plan  Arthralgia, unspecified joint  9/4/24: RNP 1.9 (<0.9 normal), DS DNA normal, Butcher normal, Sm/RNP normal, SCL 70 normal, SSA and SSB normal, chromatin normal, Ribosomal P normal, Angie 1 normal, Centromere normal, Direct RONALDO Positive, CMP was fine, RF negative, uric acid 5.2, Sed rate normal, CRP normal, CBC was fine    Medication/treatment/interventions tried include: Tylenol, she has seen orthopaedic surgeons, she had shoulder surgery, She has seen neurosurgery, Epidural injection, Trochanteric bursa injection, she has done some physical therapy, she has seen neurology, sulindac (allergic/intolerant), gabapentin (allergic/intolerant), ibuprofen (GI intolerance), She has had knee injections, she has seen psychiatry, Tumeric, " Cymbalta, Tramadol   She was found to be RONALDO positive. See below.  Her RNP test was also positive. See below.   At age 64 she most certainly has some osteoarthritis. See below.   Follow up in 3 months  We will evaluate further for autoimmune disease/inflammatory types of arthritis.   She reports a doctor in [place] > 20 years ago diagnosed her with fibromyalgia. See below.     Orders:  •  QuantiFERON-TB Gold Plus; Future  •  Hepatitis Panel, Acute; Future  •  14.3.3 ETA, Rheum. Arthritis; Future  •  RONALDO 12 Plus Profile (RDL); Future  •  RONALDO by IFA, Reflex to Titer and Pattern; Future  •  ANCA Panel; Future  •  Beta-2 Glycoprotein Antibodies; Future  •  CBC Auto Differential; Future  •  CK; Future  •  Comprehensive Metabolic Panel; Future  •  C-reactive Protein; Future  •  Cyclic Citrul Peptide Antibody, IgG / IgA; Future  •  HLA-B27 Antigen; Future  •  RF Isotypes, IgG, IgA, IgM EIA; Future  •  Sedimentation Rate; Future  •  Thyroid Antibodies; Future  •  TSH+Free T4; Future  •  Urinalysis With Culture If Indicated -; Future  •  Aldolase; Future    Fatigue, unspecified type    Labs and x-rays to evaluate for autoimmune conditions. We usually contact patients with these results within 10-14 business days    Orders:  •  QuantiFERON-TB Gold Plus; Future  •  Hepatitis Panel, Acute; Future  •  14.3.3 ETA, Rheum. Arthritis; Future  •  RONALDO 12 Plus Profile (RDL); Future  •  RONALDO by IFA, Reflex to Titer and Pattern; Future  •  ANCA Panel; Future  •  Beta-2 Glycoprotein Antibodies; Future  •  CBC Auto Differential; Future  •  CK; Future  •  Comprehensive Metabolic Panel; Future  •  C-reactive Protein; Future  •  Cyclic Citrul Peptide Antibody, IgG / IgA; Future  •  HLA-B27 Antigen; Future  •  RF Isotypes, IgG, IgA, IgM EIA; Future  •  Sedimentation Rate; Future  •  Thyroid Antibodies; Future  •  TSH+Free T4; Future  •  Urinalysis With Culture If Indicated -; Future  •  Aldolase; Future    Primary osteoarthritis involving  multiple joints  Tylenol PRN is ok as directed.   She has seen an orthopaedic surgeon  She had shoulder surgery  She saw a neurosurgeon   She has has had epidural injection   She has had trochanteric bursa injection  She has tried NSAIDS like Sulindac and ibuprofen   She has done physical therapy   She has taken gabapentin for neuropathic pain   She has had knee injections   She has tried supplements like Tumeric.   She has taken Tramadol in the past   Orders:  •  QuantiFERON-TB Gold Plus; Future  •  Hepatitis Panel, Acute; Future  •  14.3.3 ETA, Rheum. Arthritis; Future  •  RONALDO 12 Plus Profile (RDL); Future  •  RONALDO by IFA, Reflex to Titer and Pattern; Future  •  ANCA Panel; Future  •  Beta-2 Glycoprotein Antibodies; Future  •  CBC Auto Differential; Future  •  CK; Future  •  Comprehensive Metabolic Panel; Future  •  C-reactive Protein; Future  •  Cyclic Citrul Peptide Antibody, IgG / IgA; Future  •  HLA-B27 Antigen; Future  •  RF Isotypes, IgG, IgA, IgM EIA; Future  •  Sedimentation Rate; Future  •  Thyroid Antibodies; Future  •  TSH+Free T4; Future  •  Urinalysis With Culture If Indicated -; Future  •  Aldolase; Future    RONALDO positive  We reviewed the outside labs and discussed them at length. All of the patients questions were answered.   Handout on + RONALDO tests was given to the patient to take home.   An RONALDO test is a non specific test. While it certainly can be positive in conditions like SLE, scleroderma, myositis, Sjögren's, etc.. It can also be positive in patients with thyroid disease, type 1 diabetes, psoriasis, Celiacs disease, inflammatory bowel disease, etc.. There are also reports of normal/apparently healthy individuals who have been incidentally found to have a +RONALDO test. You can also sometimes get a false positive RONALDO test.   Labs today to try and determine the significance of this +RONALDO test. Patient will be contacted once these test results are available.     Orders:  •  QuantiFERON-TB Gold Plus;  Future  •  Hepatitis Panel, Acute; Future  •  14.3.3 ETA, Rheum. Arthritis; Future  •  RONALDO 12 Plus Profile (RDL); Future  •  RONALDO by IFA, Reflex to Titer and Pattern; Future  •  ANCA Panel; Future  •  Beta-2 Glycoprotein Antibodies; Future  •  CBC Auto Differential; Future  •  CK; Future  •  Comprehensive Metabolic Panel; Future  •  C-reactive Protein; Future  •  Cyclic Citrul Peptide Antibody, IgG / IgA; Future  •  HLA-B27 Antigen; Future  •  RF Isotypes, IgG, IgA, IgM EIA; Future  •  Sedimentation Rate; Future  •  Thyroid Antibodies; Future  •  TSH+Free T4; Future  •  Urinalysis With Culture If Indicated -; Future  •  Aldolase; Future    Abnormal laboratory test   The RNP test is a non specific finding. You can get false positive results. Sometimes this test is associated with Mixed Connective Tissue Disease. Typically in MCTD there are multiple positive autoantibodies. I do not think that this patient has MCTD.     Orders:  •  QuantiFERON-TB Gold Plus; Future  •  Hepatitis Panel, Acute; Future  •  14.3.3 ETA, Rheum. Arthritis; Future  •  RONALDO 12 Plus Profile (RDL); Future  •  RONALDO by IFA, Reflex to Titer and Pattern; Future  •  ANCA Panel; Future  •  Beta-2 Glycoprotein Antibodies; Future  •  CBC Auto Differential; Future  •  CK; Future  •  Comprehensive Metabolic Panel; Future  •  C-reactive Protein; Future  •  Cyclic Citrul Peptide Antibody, IgG / IgA; Future  •  HLA-B27 Antigen; Future  •  RF Isotypes, IgG, IgA, IgM EIA; Future  •  Sedimentation Rate; Future  •  Thyroid Antibodies; Future  •  TSH+Free T4; Future  •  Urinalysis With Culture If Indicated -; Future  •  Aldolase; Future    Fibromyalgia  1. H & P consistent with this diagnosis.   2. Encourage aerobic activity and sleep hygiene.  3. If she has not had a sleep study/consultation consider getting this done.   4. She has tried Cymbalta in the past.   5. Tylenol PRN is ok as directed  6. She has tried oral NSAIDS like Sulindac and ibuprofen   7. She has  seen psychiatry   8. She has tried Tumeric  9. She has taken Tramadol   10.  She was allergic/intolerant of gabapentin   Due to this I am hesitant to suggest a trial of Lyrica   11. She has done some physical therapy   12. She has seen neurology          Follow Up:   Return in about 3 months (around 8/9/2025).    Arsenio Wallace, DO  [facility] [place]

## 2025-05-09 NOTE — ASSESSMENT & PLAN NOTE
1. H & P consistent with this diagnosis.   2. Encourage aerobic activity and sleep hygiene.  3. If she has not had a sleep study/consultation consider getting this done.   4. She has tried Cymbalta in the past.   5. Tylenol PRN is ok as directed  6. She has tried oral NSAIDS like Sulindac and ibuprofen   7. She has seen psychiatry   8. She has tried Tumeric  9. She has taken Tramadol   10.  She was allergic/intolerant of gabapentin   Due to this I am hesitant to suggest a trial of Lyrica   11. She has done some physical therapy   12. She has seen neurology

## 2025-05-09 NOTE — ASSESSMENT & PLAN NOTE
Tylenol PRN is ok as directed.   She has seen an orthopaedic surgeon  She had shoulder surgery  She saw a neurosurgeon   She has has had epidural injection   She has had trochanteric bursa injection  She has tried NSAIDS like Sulindac and ibuprofen   She has done physical therapy   She has taken gabapentin for neuropathic pain   She has had knee injections   She has tried supplements like Tumeric.   She has taken Tramadol in the past   Orders:    QuantiFERON-TB Gold Plus; Future    Hepatitis Panel, Acute; Future    14.3.3 ETA, Rheum. Arthritis; Future    RONALDO 12 Plus Profile (RDL); Future    RONALDO by IFA, Reflex to Titer and Pattern; Future    ANCA Panel; Future    Beta-2 Glycoprotein Antibodies; Future    CBC Auto Differential; Future    CK; Future    Comprehensive Metabolic Panel; Future    C-reactive Protein; Future    Cyclic Citrul Peptide Antibody, IgG / IgA; Future    HLA-B27 Antigen; Future    RF Isotypes, IgG, IgA, IgM EIA; Future    Sedimentation Rate; Future    Thyroid Antibodies; Future    TSH+Free T4; Future    Urinalysis With Culture If Indicated -; Future    Aldolase; Future

## 2025-05-10 LAB
ALBUMIN SERPL-MCNC: 4.4 G/DL (ref 3.5–5.2)
ALBUMIN/GLOB SERPL: 1.4 G/DL
ALP SERPL-CCNC: 94 U/L (ref 39–117)
ALT SERPL W P-5'-P-CCNC: 13 U/L (ref 1–33)
ANION GAP SERPL CALCULATED.3IONS-SCNC: 11.9 MMOL/L (ref 5–15)
AST SERPL-CCNC: 15 U/L (ref 1–32)
BASOPHILS # BLD AUTO: 0.06 10*3/MM3 (ref 0–0.2)
BASOPHILS NFR BLD AUTO: 0.7 % (ref 0–1.5)
BILIRUB SERPL-MCNC: 0.4 MG/DL (ref 0–1.2)
BUN SERPL-MCNC: 9 MG/DL (ref 8–23)
BUN/CREAT SERPL: 16.7 (ref 7–25)
CALCIUM SPEC-SCNC: 9.8 MG/DL (ref 8.6–10.5)
CHLORIDE SERPL-SCNC: 101 MMOL/L (ref 98–107)
CK SERPL-CCNC: 100 U/L (ref 20–180)
CO2 SERPL-SCNC: 25.1 MMOL/L (ref 22–29)
CREAT SERPL-MCNC: 0.54 MG/DL (ref 0.57–1)
CRP SERPL-MCNC: 0.43 MG/DL (ref 0–0.5)
DEPRECATED RDW RBC AUTO: 43.7 FL (ref 37–54)
EGFRCR SERPLBLD CKD-EPI 2021: 104.2 ML/MIN/1.73
EOSINOPHIL # BLD AUTO: 0.39 10*3/MM3 (ref 0–0.4)
EOSINOPHIL NFR BLD AUTO: 4.5 % (ref 0.3–6.2)
ERYTHROCYTE [DISTWIDTH] IN BLOOD BY AUTOMATED COUNT: 13 % (ref 12.3–15.4)
ERYTHROCYTE [SEDIMENTATION RATE] IN BLOOD: 32 MM/HR (ref 0–30)
GLOBULIN UR ELPH-MCNC: 3.1 GM/DL
GLUCOSE SERPL-MCNC: 91 MG/DL (ref 65–99)
HAV IGM SERPL QL IA: NORMAL
HBV CORE IGM SERPL QL IA: NORMAL
HBV SURFACE AG SERPL QL IA: NORMAL
HCT VFR BLD AUTO: 41.6 % (ref 34–46.6)
HCV AB SER QL: NORMAL
HGB BLD-MCNC: 13.7 G/DL (ref 12–15.9)
IMM GRANULOCYTES # BLD AUTO: 0.02 10*3/MM3 (ref 0–0.05)
IMM GRANULOCYTES NFR BLD AUTO: 0.2 % (ref 0–0.5)
LYMPHOCYTES # BLD AUTO: 3.57 10*3/MM3 (ref 0.7–3.1)
LYMPHOCYTES NFR BLD AUTO: 41.2 % (ref 19.6–45.3)
MCH RBC QN AUTO: 30.3 PG (ref 26.6–33)
MCHC RBC AUTO-ENTMCNC: 32.9 G/DL (ref 31.5–35.7)
MCV RBC AUTO: 92 FL (ref 79–97)
MONOCYTES # BLD AUTO: 0.38 10*3/MM3 (ref 0.1–0.9)
MONOCYTES NFR BLD AUTO: 4.4 % (ref 5–12)
NEUTROPHILS NFR BLD AUTO: 4.24 10*3/MM3 (ref 1.7–7)
NEUTROPHILS NFR BLD AUTO: 49 % (ref 42.7–76)
NRBC BLD AUTO-RTO: 0 /100 WBC (ref 0–0.2)
PLATELET # BLD AUTO: 290 10*3/MM3 (ref 140–450)
PMV BLD AUTO: 10.1 FL (ref 6–12)
POTASSIUM SERPL-SCNC: 4.2 MMOL/L (ref 3.5–5.2)
PROT SERPL-MCNC: 7.5 G/DL (ref 6–8.5)
RBC # BLD AUTO: 4.52 10*6/MM3 (ref 3.77–5.28)
SODIUM SERPL-SCNC: 138 MMOL/L (ref 136–145)
T4 FREE SERPL-MCNC: 1.22 NG/DL (ref 0.92–1.68)
THYROGLOB AB SERPL-ACNC: <1 IU/ML (ref 0–0.9)
THYROPEROXIDASE AB SERPL-ACNC: 149 IU/ML (ref 0–34)
TSH SERPL DL<=0.05 MIU/L-ACNC: 2.22 UIU/ML (ref 0.27–4.2)
WBC NRBC COR # BLD AUTO: 8.66 10*3/MM3 (ref 3.4–10.8)

## 2025-05-12 ENCOUNTER — RESULTS FOLLOW-UP (OUTPATIENT)
Facility: HOSPITAL | Age: 65
End: 2025-05-12
Payer: MEDICARE

## 2025-05-12 DIAGNOSIS — R76.8 THYROID ANTIBODY POSITIVE: Primary | ICD-10-CM

## 2025-05-12 DIAGNOSIS — R76.0 ANTIPHOSPHOLIPID ANTIBODY POSITIVE: Primary | ICD-10-CM

## 2025-05-12 LAB
14-3-3 ETA AG SER IA-MCNC: <0.2 NG/ML
ALDOLASE SERPL-CCNC: 1.5 U/L (ref 3.3–10.3)
B2 GLYCOPROT1 IGA SER-ACNC: 100 GPI IGA UNITS (ref 0–25)
B2 GLYCOPROT1 IGG SER-ACNC: <9 GPI IGG UNITS (ref 0–20)
B2 GLYCOPROT1 IGM SER-ACNC: <9 GPI IGM UNITS (ref 0–32)

## 2025-05-13 LAB
C-ANCA TITR SER IF: NORMAL TITER
GAMMA INTERFERON BACKGROUND BLD IA-ACNC: 0.15 IU/ML
M TB IFN-G BLD-IMP: POSITIVE
M TB IFN-G CD4+ BCKGRND COR BLD-ACNC: 0.63 IU/ML
M TB IFN-G CD4+CD8+ BCKGRND COR BLD-ACNC: 0.55 IU/ML
MITOGEN IGNF BCKGRD COR BLD-ACNC: >10 IU/ML
MYELOPEROXIDASE AB SER IA-ACNC: <0.2 UNITS (ref 0–0.9)
P-ANCA ATYPICAL TITR SER IF: NORMAL TITER
P-ANCA TITR SER IF: NORMAL TITER
PROTEINASE3 AB SER IA-ACNC: <0.2 UNITS (ref 0–0.9)
QUANTIFERON INCUBATION: ABNORMAL
SERVICE CMNT-IMP: NORMAL

## 2025-05-16 DIAGNOSIS — R76.12 POSITIVE QUANTIFERON-TB GOLD TEST: Primary | ICD-10-CM

## 2025-05-16 LAB — HLA-B27 QL NAA+PROBE: NEGATIVE

## 2025-05-17 LAB
RF IGA SER-ACNC: <7 U
RF IGG SER-ACNC: <7 U
RF IGM SER IA-ACNC: <7 U

## 2025-05-21 ENCOUNTER — TELEPHONE (OUTPATIENT)
Age: 65
End: 2025-05-21
Payer: MEDICARE

## 2025-05-21 DIAGNOSIS — R76.12 POSITIVE QUANTIFERON-TB GOLD TEST: Primary | ICD-10-CM

## 2025-05-22 LAB
ANA PLUS 12 INTERPRETATION: ABNORMAL
ANA SER QL IF: POSITIVE
ANA SPECKLED TITR SER: ABNORMAL {TITER}
C3 SERPL-MCNC: 230 MG/DL (ref 90–180)
C4 SERPL-MCNC: 36 MG/DL (ref 10–40)
CARDIOLIPIN IGA SER IA-ACNC: <12 APL U/ML
CARDIOLIPIN IGG SER IA-ACNC: <15 GPL U/ML
CARDIOLIPIN IGM SER IA-ACNC: <13 MPL U/ML
CCP IGA+IGG SERPL IA-ACNC: <20 UNITS
CENTROMERE AB TITR SER IF: ABNORMAL {TITER}
CHROMATIN IGG SERPL-ACNC: <20 UNITS
DSDNA AB SER FARR-ACNC: <8 IU/ML
ENA SCL70 AB SER IA-ACNC: <20 UNITS
ENA SM AB SER-ACNC: <20 UNITS
ENA SS-A AB SER IA-ACNC: <20 UNITS
ENA SS-B AB SER IA-ACNC: <20 UNITS
LABORATORY COMMENT REPORT: ABNORMAL
RHEUMATOID FACT SERPL-ACNC: <14 IU/ML
THYROPEROXIDASE AB SERPL-ACNC: 92 IU/ML
U1 SNRNP AB SER IA-ACNC: <20 UNITS

## 2025-05-23 DIAGNOSIS — R76.8 THYROID ANTIBODY POSITIVE: Primary | ICD-10-CM

## 2025-06-03 ENCOUNTER — HOSPITAL ENCOUNTER (OUTPATIENT)
Dept: GENERAL RADIOLOGY | Facility: HOSPITAL | Age: 65
Discharge: HOME | End: 2025-06-03
Admitting: INTERNAL MEDICINE
Payer: MEDICARE

## 2025-06-03 DIAGNOSIS — R76.12 POSITIVE QUANTIFERON-TB GOLD TEST: ICD-10-CM

## 2025-06-03 PROCEDURE — 71046 X-RAY EXAM CHEST 2 VIEWS: CPT

## 2025-06-04 ENCOUNTER — RESULTS FOLLOW-UP (OUTPATIENT)
Age: 65
End: 2025-06-04
Payer: MEDICARE

## 2025-06-09 DIAGNOSIS — K21.00 CHRONIC REFLUX ESOPHAGITIS: ICD-10-CM

## 2025-06-09 RX ORDER — PANTOPRAZOLE SODIUM 40 MG/1
40 TABLET, DELAYED RELEASE ORAL DAILY
Qty: 90 TABLET | Refills: 0 | Status: SHIPPED | OUTPATIENT
Start: 2025-06-09

## (undated) DEVICE — SYR LUERLOK 50ML

## (undated) DEVICE — TUBING, SUCTION, 1/4" X 10', STRAIGHT: Brand: MEDLINE

## (undated) DEVICE — CONTN GRAD MEAS TRIANG 32OZ BLK

## (undated) DEVICE — KT BIOGUARD SXN VLV AIR/H20 4PC DISP

## (undated) DEVICE — ENDOGATOR HYBRID TUBING KIT FOR USE WITH ENDOGATOR IRRIGATION PUMP, OLYMPUS PUMP, GI4000 ESU, AND TORRENT IRRIGATION PUMP.: Brand: ENDOGATOR KIT

## (undated) DEVICE — Device: Brand: DEFENDO AIR/WATER/SUCTION AND BIOPSY VALVE

## (undated) DEVICE — SINGLE-USE BIOPSY FORCEPS: Brand: RADIAL JAW 4

## (undated) DEVICE — INTRO ACCSR BLNT TP

## (undated) DEVICE — THE BITE BLOCK MAXI, LATEX FREE STRAP IS USED TO PROTECT THE ENDOSCOPE INSERTION TUBE FROM BEING BITTEN BY THE PATIENT.